# Patient Record
Sex: MALE | Race: OTHER | NOT HISPANIC OR LATINO | URBAN - METROPOLITAN AREA
[De-identification: names, ages, dates, MRNs, and addresses within clinical notes are randomized per-mention and may not be internally consistent; named-entity substitution may affect disease eponyms.]

---

## 2017-05-12 ENCOUNTER — EMERGENCY (EMERGENCY)
Facility: HOSPITAL | Age: 57
LOS: 1 days | Discharge: HOME | End: 2017-05-12
Attending: EMERGENCY MEDICINE | Admitting: EMERGENCY MEDICINE
Payer: MEDICARE

## 2017-05-12 ENCOUNTER — EMERGENCY (EMERGENCY)
Facility: HOSPITAL | Age: 57
LOS: 1 days | Discharge: LEFT BEFORE DISCHARGE | End: 2017-05-12
Attending: EMERGENCY MEDICINE | Admitting: EMERGENCY MEDICINE
Payer: MEDICARE

## 2017-05-12 VITALS
OXYGEN SATURATION: 96 % | HEART RATE: 86 BPM | RESPIRATION RATE: 18 BRPM | DIASTOLIC BLOOD PRESSURE: 71 MMHG | SYSTOLIC BLOOD PRESSURE: 111 MMHG

## 2017-05-12 VITALS
TEMPERATURE: 98 F | HEART RATE: 86 BPM | DIASTOLIC BLOOD PRESSURE: 56 MMHG | SYSTOLIC BLOOD PRESSURE: 91 MMHG | RESPIRATION RATE: 18 BRPM | OXYGEN SATURATION: 90 %

## 2017-05-12 VITALS
SYSTOLIC BLOOD PRESSURE: 121 MMHG | OXYGEN SATURATION: 97 % | HEART RATE: 97 BPM | TEMPERATURE: 97 F | DIASTOLIC BLOOD PRESSURE: 77 MMHG | RESPIRATION RATE: 18 BRPM

## 2017-05-12 DIAGNOSIS — F10.129 ALCOHOL ABUSE WITH INTOXICATION, UNSPECIFIED: ICD-10-CM

## 2017-05-12 DIAGNOSIS — R41.82 ALTERED MENTAL STATUS, UNSPECIFIED: ICD-10-CM

## 2017-05-12 PROCEDURE — 99283 EMERGENCY DEPT VISIT LOW MDM: CPT | Mod: 25

## 2017-05-12 PROCEDURE — 70450 CT HEAD/BRAIN W/O DYE: CPT | Mod: 26

## 2017-05-12 PROCEDURE — 99284 EMERGENCY DEPT VISIT MOD MDM: CPT

## 2017-05-12 NOTE — ED ADULT NURSE NOTE - OBJECTIVE STATEMENT
Pt appears sleepy but verbal. Answering questions appropriately. Pt admits to using crack and drinking etoh. Pt calm and appears in no distress. Pt appears sleepy but verbal. Answering questions appropriately. Pt admits to using crack and drinking etoh. Pt calm and appears in no distress. Pt unsure if he had any falls. Denies pain.

## 2017-05-12 NOTE — ED PROVIDER NOTE - PROGRESS NOTE DETAILS
Pt eloped Pt was placed in stretcher and then eloped, left  through [facility] street exit.

## 2017-05-12 NOTE — ED PROVIDER NOTE - OBJECTIVE STATEMENT
55 y/o M with pmhx Hepatitis C, schizophrenia (Takes Seroquel and Trazodone) brought in by EMS today for altered mental status. Pt admits to drinking 2 pints of alcohol today and drinks alcohol daily. States he may have fallen earlier. Denies pain.

## 2017-05-12 NOTE — ED PROVIDER NOTE - CONSTITUTIONAL, MLM
normal... Unkempt and disheveled Unkempt and disheveled. Slurred speech. unsteady gait but able to walk without assistance

## 2017-05-12 NOTE — ED ADULT NURSE NOTE - NS ED NURSE ELOPE COMMENTS
Pt appeared in no distress upon assessment. Pt did not state he was leaving. Pt saw RN and MD. Left through ambulance bay.

## 2017-05-12 NOTE — ED ADULT NURSE NOTE - OBJECTIVE STATEMENT
Pt was seen earlier for smoking crack and drinking alcohol. Upon assessment states he left and was drinking "[place]." Pt appears in no distress answering questions appropriately. Walked in with EMS.

## 2017-05-12 NOTE — ED PROVIDER NOTE - NS ED MD SCRIBE ATTENDING SCRIBE SECTIONS
VITAL SIGNS( Pullset)/REVIEW OF SYSTEMS/PAST MEDICAL/SURGICAL/SOCIAL HISTORY/DISPOSITION/HISTORY OF PRESENT ILLNESS/HIV/PHYSICAL EXAM

## 2017-05-12 NOTE — ED PROVIDER NOTE - MEDICAL DECISION MAKING DETAILS
plan: fingerstick, BAL since pt returned after recent d/c, had a HCT earlier today. no signs of new trauma

## 2017-05-12 NOTE — ED PROVIDER NOTE - OBJECTIVE STATEMENT
57 y/o M, recently d/c after observation period in ED for 3 hrs for AMS/alcohol intoxication. Pt had straight gait and requested to be d/c. PD was called after pt was d/c as he was ambulating in the middle of the street. EMS was called. No signs of trauma. No fall. 57 y/o M, recently d/c after observation period in ED for 3 hrs for AMS/alcohol intoxication. Pt had straight gait and requested to be d/c. PD was called after pt was d/c as he was ambulating outside and seemed altered. EMS was called. No signs of trauma. No fall.

## 2017-05-12 NOTE — ED PROVIDER NOTE - NS ED MD SCRIBE ATTENDING SCRIBE SECTIONS
PAST MEDICAL/SURGICAL/SOCIAL HISTORY/PROGRESS NOTE/VITAL SIGNS( Pullset)/HISTORY OF PRESENT ILLNESS/HIV

## 2017-05-12 NOTE — ED ADULT NURSE REASSESSMENT NOTE - NS ED NURSE REASSESS COMMENT FT1
Pt appeared in no distress upon assessment. Pt did not state he was leaving. Pt saw RN and MD but did not stay for further evaluation. Pt left through ambulance bay.

## 2017-05-26 ENCOUNTER — EMERGENCY (EMERGENCY)
Facility: HOSPITAL | Age: 57
LOS: 1 days | Discharge: HOME | End: 2017-05-26
Attending: EMERGENCY MEDICINE | Admitting: EMERGENCY MEDICINE
Payer: MEDICARE

## 2017-05-26 VITALS
OXYGEN SATURATION: 96 % | TEMPERATURE: 98 F | RESPIRATION RATE: 16 BRPM | DIASTOLIC BLOOD PRESSURE: 78 MMHG | HEART RATE: 92 BPM | SYSTOLIC BLOOD PRESSURE: 104 MMHG

## 2017-05-26 VITALS
OXYGEN SATURATION: 98 % | HEART RATE: 99 BPM | TEMPERATURE: 98 F | DIASTOLIC BLOOD PRESSURE: 86 MMHG | RESPIRATION RATE: 16 BRPM | SYSTOLIC BLOOD PRESSURE: 130 MMHG

## 2017-05-26 DIAGNOSIS — R41.82 ALTERED MENTAL STATUS, UNSPECIFIED: ICD-10-CM

## 2017-05-26 DIAGNOSIS — F10.129 ALCOHOL ABUSE WITH INTOXICATION, UNSPECIFIED: ICD-10-CM

## 2017-05-26 PROCEDURE — 99285 EMERGENCY DEPT VISIT HI MDM: CPT

## 2017-05-26 PROCEDURE — 99283 EMERGENCY DEPT VISIT LOW MDM: CPT

## 2017-05-26 NOTE — ED ADULT NURSE NOTE - OBJECTIVE STATEMENT
pt to ER w/ report of alcohol intoxication.  pt awake, responds appropriately to verbal questioning w/ slurred speech, denies injury.  no evidence of trauma. pt falls easily asleep when left alone, respirations even, chest rise regular.  HOB and side rails up, aspiration precautions in effect.  Will continue to monitor. Pt to ER w/ report of alcohol intoxication.  pt awake, responds appropriately to verbal questioning w/ slurred speech, denies injury.  no evidence of trauma. pt falls easily asleep when left alone, respirations even, chest rise regular.  HOB and side rails up, aspiration precautions in effect.  Will continue to monitor.

## 2017-05-26 NOTE — ED ADULT TRIAGE NOTE - CHIEF COMPLAINT QUOTE
biba from street for AMS, pt was found stumbling on street with unsteady gait, pt has ETOH on breath

## 2017-05-26 NOTE — ED PROVIDER NOTE - OBJECTIVE STATEMENT
57yo M here with etoh intoxication. admits to drinking daily. has been seen for same at [facility].

## 2017-05-26 NOTE — ED PROVIDER NOTE - MEDICAL DECISION MAKING DETAILS
here for etoh intoxication. sobered up, then started asking people for money in the ed. discharged with security

## 2017-09-19 ENCOUNTER — EMERGENCY (EMERGENCY)
Facility: HOSPITAL | Age: 57
LOS: 1 days | Discharge: HOME | End: 2017-09-19
Attending: EMERGENCY MEDICINE | Admitting: EMERGENCY MEDICINE
Payer: MEDICARE

## 2017-09-19 VITALS
DIASTOLIC BLOOD PRESSURE: 80 MMHG | HEART RATE: 82 BPM | OXYGEN SATURATION: 96 % | SYSTOLIC BLOOD PRESSURE: 168 MMHG | TEMPERATURE: 98 F | RESPIRATION RATE: 16 BRPM

## 2017-09-19 VITALS — HEART RATE: 99 BPM | RESPIRATION RATE: 16 BRPM | OXYGEN SATURATION: 96 %

## 2017-09-19 DIAGNOSIS — F14.10 COCAINE ABUSE, UNCOMPLICATED: ICD-10-CM

## 2017-09-19 DIAGNOSIS — Z72.0 TOBACCO USE: ICD-10-CM

## 2017-09-19 DIAGNOSIS — Z23 ENCOUNTER FOR IMMUNIZATION: ICD-10-CM

## 2017-09-19 DIAGNOSIS — E03.9 HYPOTHYROIDISM, UNSPECIFIED: ICD-10-CM

## 2017-09-19 DIAGNOSIS — F10.10 ALCOHOL ABUSE, UNCOMPLICATED: ICD-10-CM

## 2017-09-19 PROCEDURE — 90715 TDAP VACCINE 7 YRS/> IM: CPT

## 2017-09-19 PROCEDURE — 99283 EMERGENCY DEPT VISIT LOW MDM: CPT | Mod: 25

## 2017-09-19 PROCEDURE — 90471 IMMUNIZATION ADMIN: CPT

## 2017-09-19 PROCEDURE — 36415 COLL VENOUS BLD VENIPUNCTURE: CPT

## 2017-09-19 PROCEDURE — 80053 COMPREHEN METABOLIC PANEL: CPT

## 2017-09-19 PROCEDURE — 93010 ELECTROCARDIOGRAM REPORT: CPT

## 2017-09-19 PROCEDURE — 80307 DRUG TEST PRSMV CHEM ANLYZR: CPT

## 2017-09-19 PROCEDURE — 85025 COMPLETE CBC W/AUTO DIFF WBC: CPT

## 2017-09-19 PROCEDURE — 93005 ELECTROCARDIOGRAM TRACING: CPT

## 2017-09-19 PROCEDURE — 99284 EMERGENCY DEPT VISIT MOD MDM: CPT | Mod: 25

## 2017-09-19 RX ORDER — SODIUM CHLORIDE 9 MG/ML
1000 INJECTION INTRAMUSCULAR; INTRAVENOUS; SUBCUTANEOUS ONCE
Qty: 0 | Refills: 0 | Status: DISCONTINUED | OUTPATIENT
Start: 2017-09-19 | End: 2017-09-19

## 2017-09-19 RX ORDER — TETANUS TOXOID, REDUCED DIPHTHERIA TOXOID AND ACELLULAR PERTUSSIS VACCINE, ADSORBED 5; 2.5; 8; 8; 2.5 [IU]/.5ML; [IU]/.5ML; UG/.5ML; UG/.5ML; UG/.5ML
0.5 SUSPENSION INTRAMUSCULAR ONCE
Qty: 0 | Refills: 0 | Status: COMPLETED | OUTPATIENT
Start: 2017-09-19 | End: 2017-09-19

## 2017-09-19 RX ADMIN — TETANUS TOXOID, REDUCED DIPHTHERIA TOXOID AND ACELLULAR PERTUSSIS VACCINE, ADSORBED 0.5 MILLILITER(S): 5; 2.5; 8; 8; 2.5 SUSPENSION INTRAMUSCULAR at 20:04

## 2017-09-19 RX ADMIN — Medication 50 MILLIGRAM(S): at 22:51

## 2017-09-19 NOTE — ED PROVIDER NOTE - PHYSICAL EXAMINATION
VITAL SIGNS: I have reviewed nursing notes and confirm.  CONSTITUTIONAL: Well-developed discheveled man smelling of urine and alcohol lying in stretcher A&Ox3 speakign clearly in complete sentences, following commands appropriately; in no acute distress.  SKIN: Agree with RN documentation regarding decubitus evaluation. Remainder of skin exam is warm and dry, no acute rash.  HEAD: Normocephalic; atraumatic.  EYES: PERRL, EOM intact; conjunctiva and sclera clear.  ENT: No nasal discharge; airway clear.  NECK: Supple; non tender.  CARD: S1, S2 normal; no murmurs, gallops, or rubs. Regular rate and rhythm.  RESP: No wheezes, rales or rhonchi, CTA b/l w/good excursion, no tachypnea or inc wob  ABD: Normal bowel sounds; soft; non-distended; non-tender  EXT: Normal ROM. No clubbing, cyanosis or edema.  LYMPH: No acute cervical adenopathy.  NEURO: Alert, oriented. Grossly unremarkable.  PSYCH: Cooperative, appropriate.

## 2017-09-19 NOTE — ED ADULT NURSE NOTE - OBJECTIVE STATEMENT
56 y/o male presenting to ER c/o headache after bumping into a wall, pt reports taking 50 UDS worth of cocaine and drinking 1 bottle of vodka today. redness and hematoma noted to the left side of forehead, Pt denies LOC. EKG done upon arrival. Pt unable to recall last tetanus.

## 2017-09-19 NOTE — ED ADULT NURSE NOTE - CHIEF COMPLAINT QUOTE
biba from street for etoh (1 bottle vodka) and did $50 worth of cocaine today per ems.  A&Ox3.  Denies discomfort / LOC.  Laceration to upper lip - patient ran into pole. Bleeding controlled.  Also c/o back pain.  Unknown tetanus

## 2017-09-19 NOTE — ED PROVIDER NOTE - MEDICAL DECISION MAKING DETAILS
Clinically sober, no cough or belly pain, told to return to ED or seek medical attention if he develops cough/abd pain/fever. Given return precautions, given librium to avoid etoh withdrawal.

## 2017-09-19 NOTE — ED PROVIDER NOTE - OBJECTIVE STATEMENT
58 y/o M w/known etoh abuse admits to drinking alcohol (vodka, beer) and snorting cocaine, found by EMS sleeping on the street, brought in for eval, no pain of any kind. Denies trauma/falls. Asking to sleep and to have a sandwich.

## 2017-09-19 NOTE — ED ADULT TRIAGE NOTE - CHIEF COMPLAINT QUOTE
biba from street for etoh (1 bottle vodka) and did $50 worth of cocaine today per ems.  A&Ox3.  Denies discomfort / LOC.  Laceration to upper lip - patient ran into pole. biba from street for etoh (1 bottle vodka) and did $50 worth of cocaine today per ems.  A&Ox3.  Denies discomfort / LOC.  Laceration to upper lip - patient ran into pole. Bleeding controlled.  Also c/o back pain.  Unknown tetanus

## 2017-10-05 ENCOUNTER — EMERGENCY (EMERGENCY)
Facility: HOSPITAL | Age: 57
LOS: 1 days | Discharge: HOME | End: 2017-10-05
Attending: EMERGENCY MEDICINE | Admitting: EMERGENCY MEDICINE
Payer: MEDICARE

## 2017-10-05 VITALS
WEIGHT: 179.9 LBS | TEMPERATURE: 98 F | DIASTOLIC BLOOD PRESSURE: 76 MMHG | SYSTOLIC BLOOD PRESSURE: 124 MMHG | RESPIRATION RATE: 16 BRPM | HEART RATE: 100 BPM | OXYGEN SATURATION: 96 %

## 2017-10-05 DIAGNOSIS — F10.129 ALCOHOL ABUSE WITH INTOXICATION, UNSPECIFIED: ICD-10-CM

## 2017-10-05 DIAGNOSIS — F20.9 SCHIZOPHRENIA, UNSPECIFIED: ICD-10-CM

## 2017-10-05 DIAGNOSIS — E03.9 HYPOTHYROIDISM, UNSPECIFIED: ICD-10-CM

## 2017-10-05 DIAGNOSIS — R41.82 ALTERED MENTAL STATUS, UNSPECIFIED: ICD-10-CM

## 2017-10-05 PROCEDURE — 99285 EMERGENCY DEPT VISIT HI MDM: CPT

## 2017-10-05 PROCEDURE — 99283 EMERGENCY DEPT VISIT LOW MDM: CPT

## 2017-10-05 NOTE — ED PROVIDER NOTE - ATTENDING CONTRIBUTION TO CARE
57 yom bibems for intox, admits to EtOH use, no trauma noted.  limited history 2/2 mental status.    agree w/ PA, clinically intoxicated, no trauma noted on exam, protecting airway, will assess for sobriety.

## 2017-10-05 NOTE — ED ADULT NURSE NOTE - OBJECTIVE STATEMENT
Pt presents to ED c/o alcohol intoxication. Pt states "I drank 2 or 3 pints of bacardi today. I went to the police station because I felt dizzy." Pt denies falling, trauma, hitting head. Pt presents in NAD speaking full sentences via EMS stretcher through triage. Alcohol odor noted on breath.

## 2017-10-06 VITALS
DIASTOLIC BLOOD PRESSURE: 72 MMHG | RESPIRATION RATE: 17 BRPM | HEART RATE: 92 BPM | OXYGEN SATURATION: 97 % | TEMPERATURE: 98 F | SYSTOLIC BLOOD PRESSURE: 132 MMHG

## 2017-10-27 ENCOUNTER — EMERGENCY (EMERGENCY)
Facility: HOSPITAL | Age: 57
LOS: 1 days | Discharge: HOME | End: 2017-10-27
Attending: EMERGENCY MEDICINE | Admitting: EMERGENCY MEDICINE
Payer: MEDICARE

## 2017-10-27 VITALS
SYSTOLIC BLOOD PRESSURE: 125 MMHG | TEMPERATURE: 98 F | OXYGEN SATURATION: 95 % | DIASTOLIC BLOOD PRESSURE: 87 MMHG | RESPIRATION RATE: 16 BRPM | HEART RATE: 80 BPM

## 2017-10-27 DIAGNOSIS — R41.82 ALTERED MENTAL STATUS, UNSPECIFIED: ICD-10-CM

## 2017-10-27 DIAGNOSIS — F10.129 ALCOHOL ABUSE WITH INTOXICATION, UNSPECIFIED: ICD-10-CM

## 2017-10-27 LAB
ALBUMIN SERPL ELPH-MCNC: 3.6 G/DL — SIGNIFICANT CHANGE UP (ref 3.4–5)
ALP SERPL-CCNC: 75 U/L — SIGNIFICANT CHANGE UP (ref 40–120)
ALT FLD-CCNC: 54 U/L — HIGH (ref 12–42)
ANION GAP SERPL CALC-SCNC: 15 MMOL/L — SIGNIFICANT CHANGE UP (ref 9–16)
AST SERPL-CCNC: 88 U/L — HIGH (ref 15–37)
BASOPHILS NFR BLD AUTO: 0.2 % — SIGNIFICANT CHANGE UP (ref 0–2)
BILIRUB SERPL-MCNC: 0.2 MG/DL — SIGNIFICANT CHANGE UP (ref 0.2–1.2)
BUN SERPL-MCNC: 15 MG/DL — SIGNIFICANT CHANGE UP (ref 7–23)
CALCIUM SERPL-MCNC: 9.1 MG/DL — SIGNIFICANT CHANGE UP (ref 8.5–10.5)
CHLORIDE SERPL-SCNC: 108 MMOL/L — SIGNIFICANT CHANGE UP (ref 96–108)
CO2 SERPL-SCNC: 21 MMOL/L — LOW (ref 22–31)
CREAT SERPL-MCNC: 0.92 MG/DL — SIGNIFICANT CHANGE UP (ref 0.5–1.3)
EOSINOPHIL NFR BLD AUTO: 0.5 % — SIGNIFICANT CHANGE UP (ref 0–6)
ETHANOL SERPL-MCNC: 219 MG/DL — HIGH
GLUCOSE SERPL-MCNC: 90 MG/DL — SIGNIFICANT CHANGE UP (ref 70–99)
HCT VFR BLD CALC: 37 % — LOW (ref 39–50)
HGB BLD-MCNC: 12.3 G/DL — LOW (ref 13–17)
IMM GRANULOCYTES NFR BLD AUTO: 0.4 % — SIGNIFICANT CHANGE UP (ref 0–1.5)
LIDOCAIN IGE QN: 110 U/L — SIGNIFICANT CHANGE UP (ref 73–393)
LYMPHOCYTES # BLD AUTO: 18.9 % — SIGNIFICANT CHANGE UP (ref 13–44)
MAGNESIUM SERPL-MCNC: 1.9 MG/DL — SIGNIFICANT CHANGE UP (ref 1.6–2.6)
MCHC RBC-ENTMCNC: 31.2 PG — SIGNIFICANT CHANGE UP (ref 27–34)
MCHC RBC-ENTMCNC: 33.2 G/DL — SIGNIFICANT CHANGE UP (ref 32–36)
MCV RBC AUTO: 93.9 FL — SIGNIFICANT CHANGE UP (ref 80–100)
MONOCYTES NFR BLD AUTO: 7.5 % — SIGNIFICANT CHANGE UP (ref 2–14)
NEUTROPHILS NFR BLD AUTO: 72.5 % — SIGNIFICANT CHANGE UP (ref 43–77)
PLATELET # BLD AUTO: 310 K/UL — SIGNIFICANT CHANGE UP (ref 150–400)
POTASSIUM SERPL-MCNC: 4.2 MMOL/L — SIGNIFICANT CHANGE UP (ref 3.5–5.3)
POTASSIUM SERPL-SCNC: 4.2 MMOL/L — SIGNIFICANT CHANGE UP (ref 3.5–5.3)
PROT SERPL-MCNC: 8 G/DL — SIGNIFICANT CHANGE UP (ref 6.4–8.2)
RBC # BLD: 3.94 M/UL — LOW (ref 4.2–5.8)
RBC # FLD: 13.6 % — SIGNIFICANT CHANGE UP (ref 10.3–16.9)
SODIUM SERPL-SCNC: 144 MMOL/L — SIGNIFICANT CHANGE UP (ref 132–145)
TSH SERPL-MCNC: 0.36 UIU/ML — SIGNIFICANT CHANGE UP (ref 0.36–3.74)
WBC # BLD: 13.7 K/UL — HIGH (ref 3.8–10.5)
WBC # FLD AUTO: 13.7 K/UL — HIGH (ref 3.8–10.5)

## 2017-10-27 PROCEDURE — 70450 CT HEAD/BRAIN W/O DYE: CPT | Mod: 26

## 2017-10-27 PROCEDURE — 71010: CPT | Mod: 26

## 2017-10-27 PROCEDURE — 93010 ELECTROCARDIOGRAM REPORT: CPT

## 2017-10-27 PROCEDURE — 99284 EMERGENCY DEPT VISIT MOD MDM: CPT | Mod: 25

## 2017-10-27 NOTE — ED PROVIDER NOTE - CARE PLAN
Principal Discharge DX:	Altered mental status Principal Discharge DX:	Altered mental status  Secondary Diagnosis:	Alcohol intoxication

## 2017-10-27 NOTE — ED PROVIDER NOTE - MEDICAL DECISION MAKING DETAILS
pt with AMS, will check labs including BAL, drug screen, HCT, will observe for sobriety and reassess

## 2017-10-27 NOTE — ED PROVIDER NOTE - OBJECTIVE STATEMENT
57 male, hx of schizophrenia? on seroquel, also hx of Hep C?, limited hx since pt seems acutely intoxicated. Pt BIBEMS for AMS, suspicious for alcohol intox and polysubstance use. no signs of trauma. no chest pain or SOB. Old chart review shows multiple visits w similar presentation.

## 2017-10-31 ENCOUNTER — EMERGENCY (EMERGENCY)
Facility: HOSPITAL | Age: 57
LOS: 1 days | Discharge: HOME | End: 2017-10-31
Attending: EMERGENCY MEDICINE | Admitting: EMERGENCY MEDICINE
Payer: MEDICARE

## 2017-10-31 VITALS
RESPIRATION RATE: 18 BRPM | OXYGEN SATURATION: 98 % | HEART RATE: 85 BPM | TEMPERATURE: 97 F | DIASTOLIC BLOOD PRESSURE: 71 MMHG | SYSTOLIC BLOOD PRESSURE: 104 MMHG

## 2017-10-31 PROCEDURE — 70450 CT HEAD/BRAIN W/O DYE: CPT | Mod: 26

## 2017-10-31 PROCEDURE — 99284 EMERGENCY DEPT VISIT MOD MDM: CPT

## 2017-10-31 NOTE — ED PROVIDER NOTE - OBJECTIVE STATEMENT
58 y/o Male BIBA for AMS. Pt admitted to EMS he drank alcohol today and has no other complaints at this time. Currently in the ED, pt is sleeping and does not respond to verbal stimuli

## 2017-10-31 NOTE — ED ADULT NURSE NOTE - CAS DISCH CONDITION
Stable Keystone Flap Text: The defect edges were debeveled with a #15 scalpel blade.  Given the location of the defect, shape of the defect a keystone flap was deemed most appropriate.  Using a sterile surgical marker, an appropriate keystone flap was drawn incorporating the defect, outlining the appropriate donor tissue and placing the expected incisions within the relaxed skin tension lines where possible. The area thus outlined was incised deep to adipose tissue with a #15 scalpel blade.  The skin margins were undermined to an appropriate distance in all directions around the primary defect and laterally outward around the flap utilizing iris scissors.

## 2017-10-31 NOTE — ED PROVIDER NOTE - PROGRESS NOTE DETAILS
The patient is now awake and alert, clinically sober.  Able to walk a straight line.  Repeat exam and neuro/cranial nerve exams normal.  No evidence of head/neck trauma.  Patient denies any pain or other complaints.  Denies cp/sob/ha/abd pain.  Abd soft, lungs clear, heart exam normal.  Chang po challenge.  Patient says only used alcohol no other substances.  Denies any assault.  Feels much better and pt feels safe for discharge.  No evidence of intoxication at this time or alcohol withdrawal.  No other complaints on discharge.

## 2017-10-31 NOTE — ED ADULT NURSE NOTE - OBJECTIVE STATEMENT
Pt brought in by EMS for alcohol intoxication, pt admits to drinking this morning. Pt AAO x 3 , ambulatory with steady gait

## 2017-11-05 DIAGNOSIS — R41.82 ALTERED MENTAL STATUS, UNSPECIFIED: ICD-10-CM

## 2017-11-05 DIAGNOSIS — F10.10 ALCOHOL ABUSE, UNCOMPLICATED: ICD-10-CM

## 2018-02-15 ENCOUNTER — HOSPITAL ENCOUNTER (INPATIENT)
Dept: HOSPITAL 74 - YASAS | Age: 58
LOS: 1 days | Discharge: LEFT BEFORE DISCHARGE | DRG: 894 | End: 2018-02-16
Attending: INTERNAL MEDICINE | Admitting: INTERNAL MEDICINE
Payer: COMMERCIAL

## 2018-02-15 VITALS — BODY MASS INDEX: 31.3 KG/M2

## 2018-02-15 DIAGNOSIS — F10.230: ICD-10-CM

## 2018-02-15 DIAGNOSIS — F25.9: ICD-10-CM

## 2018-02-15 DIAGNOSIS — F51.05: ICD-10-CM

## 2018-02-15 DIAGNOSIS — F17.213: ICD-10-CM

## 2018-02-15 DIAGNOSIS — F14.20: ICD-10-CM

## 2018-02-15 DIAGNOSIS — E05.90: ICD-10-CM

## 2018-02-15 DIAGNOSIS — F19.230: Primary | ICD-10-CM

## 2018-02-15 DIAGNOSIS — B18.2: ICD-10-CM

## 2018-02-15 LAB
ALBUMIN SERPL-MCNC: 4.1 G/DL (ref 3.4–5)
ALP SERPL-CCNC: 103 U/L (ref 45–117)
ALT SERPL-CCNC: 152 U/L (ref 12–78)
ANION GAP SERPL CALC-SCNC: 10 MMOL/L (ref 8–16)
APPEARANCE UR: CLEAR
AST SERPL-CCNC: 107 U/L (ref 15–37)
BILIRUB SERPL-MCNC: 0.7 MG/DL (ref 0.2–1)
BILIRUB UR STRIP.AUTO-MCNC: NEGATIVE MG/DL
BUN SERPL-MCNC: 17 MG/DL (ref 7–18)
CALCIUM SERPL-MCNC: 8.8 MG/DL (ref 8.5–10.1)
CHLORIDE SERPL-SCNC: 106 MMOL/L (ref 98–107)
CO2 SERPL-SCNC: 24 MMOL/L (ref 21–32)
COLOR UR: YELLOW
CREAT SERPL-MCNC: 0.9 MG/DL (ref 0.7–1.3)
DEPRECATED RDW RBC AUTO: 14.3 % (ref 11.9–15.9)
GLUCOSE SERPL-MCNC: 117 MG/DL (ref 74–106)
HCT VFR BLD CALC: 40.8 % (ref 35.4–49)
HGB BLD-MCNC: 13.5 GM/DL (ref 11.7–16.9)
KETONES UR QL STRIP: (no result)
LEUKOCYTE ESTERASE UR QL STRIP.AUTO: NEGATIVE
MCH RBC QN AUTO: 30.1 PG (ref 25.7–33.7)
MCHC RBC AUTO-ENTMCNC: 33.2 G/DL (ref 32–35.9)
MCV RBC: 90.5 FL (ref 80–96)
NITRITE UR QL STRIP: NEGATIVE
PH UR: 5 [PH] (ref 5–8)
PLATELET # BLD AUTO: 320 K/MM3 (ref 134–434)
PMV BLD: 8.1 FL (ref 7.5–11.1)
POTASSIUM SERPLBLD-SCNC: 3.8 MMOL/L (ref 3.5–5.1)
PROT SERPL-MCNC: 8.5 G/DL (ref 6.4–8.2)
PROT UR QL STRIP: NEGATIVE
PROT UR QL STRIP: NEGATIVE
RBC # BLD AUTO: 4.5 M/MM3 (ref 4–5.6)
RBC # UR STRIP: NEGATIVE /UL
SODIUM SERPL-SCNC: 140 MMOL/L (ref 136–145)
SP GR UR: 1.02 (ref 1–1.03)
UROBILINOGEN UR STRIP-MCNC: (no result) MG/DL (ref 0.2–1)
WBC # BLD AUTO: 9.4 K/MM3 (ref 4–10)

## 2018-02-15 PROCEDURE — HZ2ZZZZ DETOXIFICATION SERVICES FOR SUBSTANCE ABUSE TREATMENT: ICD-10-PCS | Performed by: INTERNAL MEDICINE

## 2018-02-15 RX ADMIN — NICOTINE SCH MG: 14 PATCH, EXTENDED RELEASE TRANSDERMAL at 15:23

## 2018-02-15 NOTE — HP
CIWA Score





- CIWA Score


Nausea/Vomitin-Int. Nausea w/Dry Heave


Muscle Tremors: 4-Moderate,w/Arms Extend


Anxiety: 4-Mod. Anxious/Guarded


Agitation: 4-Moderately Restless


Paroxysmal Sweats: No Perspiration


Orientation: 0-Oriented


Tacttile Disturbances: 3-Moderate Itch/Numb/Burn


Auditory Disturbances: 0-None


Visual Disturbances: 0-None


Headache: 0-None Present


CIWA-Ar Total Score: 19





Admission ROS [facility]





- HPI


Chief Complaint: 





WITHDRAWAL SX FROM ALCOHOL


Allergies/Adverse Reactions: 


 Allergies











Allergy/AdvReac Type Severity Reaction Status Date / Time


 


No Known Allergies Allergy   Verified 02/15/18 11:08











History of Present Illness: 





58 Y/O AA/MALE WITH A HX OF ALCOHOL DEPENDENCE SEEKING DETOX [place]


Exam Limitations: No Limitations





- Ebola screening


Have you traveled outside of the country in the last 21 days: No


Have you had contact with anyone from an Ebola affected area: No


Have you been sick,other than usual withdrawal symptoms: No





- Review of Systems


Constitutional: Chills, Night Sweats, Changes in sleep


Respiratory: reports: No Symptoms reported


Cardiac: reports: Lightheadedness


GI: reports: Nausea, Vomiting


: reports: No Symptoms Reported


Musculoskeletal: reports: No Symptoms Reported


Integumentary: reports: No Symptoms Reported


Neuro: reports: Headache, Tremors, Unsteady Gait, Dizziness


Endocrine: reports: No Symptoms Reported


Hematology: reports: No Symptoms Reported


Psychiatric: reports: Orientated x3, Anxious, Depressed


Other Systems: Reviewed and Negative





Patient History





- Patient Medical History


Hx Anemia: No


Hx Asthma: No


Hx Chronic Obstructive Pulmonary Disease (COPD): No


Hx Cancer: No


Hx Cardiac Disorders: No


Hx Congestive Heart Failure: No


Hx Hypertension: No


Hx Hypercholesterolemia: No


Hx Pacemaker: No


HX Cerebrovascular Accident: No


Hx Seizures: No


Hx Dementia: No


Hx Diabetes: No


Hx Gastrointestinal Disorders: No


Hx Liver Disease: No


Hx Genitourinary Disorders: No


Hx Sexually Transmitted Disorders: No


Hx Renal Disease (ESRD): No


Hx Thyroid Disease: Yes (HX HYPERTHYROIDISM- NO MED)


Hx Human Immunodeficiency Virus (HIV): No (NEGATIVE HX)


Hx Hepatitis C: Yes (1 year)


Hx Depression: Yes (INSOMNIA-SEROQUEL,TRAZODONE & VISTARIL)


Hx Suicide Attempt: No (DENIES PREVIOUS AND CURRENT S/H/I TODAY)


Hx Bipolar Disorder: No


Hx Schizophrenia: No (DENIES)





- Patient Surgical History


Past Surgical History: Yes


Hx Neurologic Surgery: No


Hx Cataract Extraction: No


Hx Cardiac Surgery: No


Hx Lung Surgery: No


Hx Breast Surgery: No


Hx Breast Biopsy: No


Hx Abdominal Surgery: No


Hx Appendectomy: No


Hx Cholecystectomy: No


Hx Genitourinary Surgery: No


Hx Orthopedic Surgery: Yes (RT.HAND 4TH FINGER)


Anesthesia Reaction: No





- PPD History


Previous Implant?: Yes


Documented Results: Negative w/proof


Implanted On Prior [facility] Admission?: Yes


Date: 10/29/16


Results: 0 mm


PPD to be Administered?: Yes





- Reproductive History


Patient is a Female of Child Bearing Age (11 -55 yrs old): No (MALE)





- Smoking Cessation


Smoking history: Current every day smoker


Have you smoked in the past 12 months: Yes


Aproximately how many cigarettes per day: 5


Cigars Per Day: 0


Hx Chewing Tobacco Use: No


Initiated information on smoking cessation: Yes


'Breaking Loose' booklet given: 02/15/18





- Substance & Tx. History


Hx Alcohol Use: Yes (RUM/BEER)


Hx Substance Use: Yes (COCAINE/MARIJUANA)


Substance Use Type: Alcohol, Cocaine, Marijuana


Hx Substance Use Treatment: Yes





- Substances Abused


  ** Alcohol


Route: Oral


Frequency: Daily


Amount used: rum(2 pints)/beer(5-16oz cans)


Age of first use: 16


Date of Last Use: 18





  ** Cocaine


Route: Smoking


Frequency: Daily


Amount used: $50


Age of first use: 24


Date of Last Use: 18





  ** Marijuana/Hashish


Route: Smoking


Frequency: Daily


Amount used: $20


Age of first use: 16


Date of Last Use: 18





Family Disease History





- Family Disease History


Family Disease History: Other: Father (alcohol), Mother (alcohol), Brother (

alcohol), Sister (alcohol)





Admission Physical Exam [facility]





- Vital Signs


Vital Signs: 


 Vital Signs - 24 hr











  02/15/18





  10:43


 


Temperature 96.8 F L


 


Pulse Rate 104 H


 


Respiratory 20





Rate 


 


Blood Pressure 150/73














- Physical


General Appearance: Yes: Moderate Distress, Alcohol on Breath, Intoxicated, 

Anxious


HEENTM: Yes: EOMI, Normocephalic, LIZET, Pharynx Normal


Respiratory: Yes: Chest Non-Tender, Lungs Clear, Normal Breath Sounds, No 

Respiratory Distress


Neck: Yes: No masses,lesions,Nodules, Supple, Trachea in good position


Breast: Yes: Breast Exam Deferred


Cardiology: Yes: Regular Rhythm, Regular Rate, S1, S2


Abdominal: Yes: Normal Bowel Sounds, Non Tender, Flat, Soft


Genitourinary: Yes: Other (N/C)


Back: Yes: Within Normal Limits


Musculoskeletal: Yes: full range of Motion, Gait Steady


Extremities: Yes: Normal Range of Motion, Non-Tender


Neurological: Yes: CNs II-XII NML intact, Fully Oriented, Alert, Motor Strength 

5/5


Integumentary: Yes: Dry, Warm


Lymphatic: Yes: Within Normal Limits





- Diagnostic


(1) Hepatitis C carrier


Current Visit: Yes   Status: Chronic   





(2) Hyperthyroidism


Current Visit: Yes   Status: Chronic   Comment: PT HX-NO MEDS   





(3) Nicotine dependence


Current Visit: Yes   Status: Acute   


Qualifiers: 


   Nicotine product type: cigarettes   Substance use status: in withdrawal   

Qualified Code(s): F17.213 - Nicotine dependence, cigarettes, with withdrawal   





(4) Alcohol dependence with uncomplicated withdrawal


Current Visit: Yes   Status: Acute   





(5) Cocaine dependence with withdrawal


Current Visit: Yes   Status: Acute   





(6) Insomnia secondary to depression with anxiety


Current Visit: Yes   Status: Chronic   





Cleared for Admission [facility]





- Detox or Rehab


[facility] Level of Care: Medically Managed


Detox Regimen/Protocol: Librium





[facility] Breath Alcohol Content


Breath Alcohol Content: 0.035





Urine Drug Screen





- Results


Drug Screen Negative: No


Urine Drug Screen Results: MOMO-Cocaine

## 2018-02-16 VITALS — TEMPERATURE: 98.3 F | SYSTOLIC BLOOD PRESSURE: 147 MMHG | DIASTOLIC BLOOD PRESSURE: 89 MMHG | HEART RATE: 90 BPM

## 2018-02-16 RX ADMIN — NICOTINE SCH MG: 14 PATCH, EXTENDED RELEASE TRANSDERMAL at 10:05

## 2018-02-16 NOTE — EKG
Test Reason : 

Blood Pressure : ***/*** mmHG

Vent. Rate : 102 BPM     Atrial Rate : 102 BPM

   P-R Int : 136 ms          QRS Dur : 086 ms

    QT Int : 364 ms       P-R-T Axes : 032 011 015 degrees

   QTc Int : 474 ms

 

SINUS TACHYCARDIA

OTHERWISE NORMAL ECG

NO PREVIOUS ECGS AVAILABLE

Confirmed by SONAM DAWN MD ([id number]) on 2/16/2018 9:22:00 AM

 

Referred By:             Confirmed By:SONAM DAWN MD

## 2018-02-16 NOTE — CONSULT
[facility] Psychiatric Consult





- Data


Date of interview: 02/16/18


Admission source: [facility]


Identifying data: Readmission to [facility] for this 56 y/o  male 

seeking detox treatment on [address] for alcohol,cocaine and cannabis 

dependence.PAtient is single without children,homeless,unemployed and supported 

on SSD benefits.


Substance Abuse History: Confirmed by patient in this session.Details in 

current [facility] report. Smoking history: Current every day smoker.  Have you smoked 

in the past 12 months: Yes.  Aproximately how many cigarettes per day: 5.  

Cigars Per Day: 0.  Hx Chewing Tobacco Use: No.  Initiated information on 

smoking cessation: Yes.  'Breaking Loose' booklet given: 02/15/18.  - Substance 

& Tx. History.  Hx Alcohol Use: Yes (RUM/BEER).  Hx Substance Use: Yes (COCAINE/

MARIJUANA).  Substance Use Type: Alcohol, Cocaine, Marijuana.  Hx Substance Use 

Treatment: Yes.  - Substances Abused.  ** Alcohol.  Route: Oral.  Frequency: 

Daily.  Amount used: rum(2 pints)/beer(5-16oz cans).  Age of first use: 16.  

Date of Last Use: 02/14/18.  ** Cocaine.  Route: Smoking.  Frequency: Daily.  

Amount used: $50.  Age of first use: 24.  Date of Last Use: 02/14/18.  ** 

Marijuana/Hashish.  Route: Smoking.  Frequency: Daily.  Amount used: $20.  Age 

of first use: 16.  Date of Last Use: 02/14/18


Medical History: Hepatitis C,hyperthyroidism and a history of orthosurgery (

fourth finger of right hand).


Psychiatric History: Onset of psychiatric disturbances (age 24).Patient 

presents with a history of three psychiatric hospitalizations ([facility] at age 24 + [facility] in 2015 + [facility] six 

months ago).Diagnosed with MDD.Currently maintained on a regimen of seroquel 

300 mg/hs + trazodone 100 mg/hs.Reportedly taken two days prior to this [facility] 

visit.Mr العلي declares that he gets psychiatric OPD care at the [facility] 

Hospital clinic.Denies history of suicide attempts.


Physical/Sexual Abuse/Trauma History: Patient denies history of abuse.Served in 

the [employer] from 1976 to 1980.No reported combat experience.


Additional Comment: Urine Drug Screen Results: MOMO-Cocaine.Noted.





Mental Status Exam





- Mental Status Exam


Alert and Oriented to: Time, Place, Person


Cognitive Function: Good


Patient Appearance: Well Groomed


Mood: Nervous, Withdrawn, Anxious


Affect: Mood Congruent, Constricted


Patient Behavior: Appropriate, Cooperative


Speech Pattern: Clear, Appropriate


Voice Loudness: Normal


Thought Process: Intact, Goal Oriented


Thought Disorder: Not Present


Hallucinations: Denies


Suicidal Ideation: Denies


Homicidal Ideation: Denies


Insight/Judgement: Poor


Sleep: Poorly, Difficulty falling asleep


Appetite: Good


Muscle strength/Tone: Normal


Gait/Station: Normal





Psychiatric Findings





- Problem List (Axis 1, 2,3)


(1) Alcohol dependence with uncomplicated withdrawal


Current Visit: Yes   Status: Acute   





(2) Cocaine dependence with withdrawal


Current Visit: Yes   Status: Acute   





(3) Nicotine dependence


Current Visit: Yes   Status: Acute   


Qualifiers: 


   Nicotine product type: cigarettes   Substance use status: in withdrawal   

Qualified Code(s): F17.213 - Nicotine dependence, cigarettes, with withdrawal   





(4) Substance induced mood disorder


Current Visit: Yes   Status: Acute   





(5) Schizoaffective disorder


Current Visit: Yes   Status: Chronic   Comment: As per records.On 

medications.Patient is followed at the [facility] in [facility].   





(6) Insomnia


Current Visit: Yes   Status: Acute   





- Initial Treatment Plan


Initial Treatment Plan: Psychoeducation and support.Records 

revisited.Detoxification in progress.Sleep hygiene.Medications : seroquel 200 

mg po hs + trazodone 100 mg po hs.Ordered.Side effects/benefits discussed with 

the patient.Mr العلي is made aware of risk of priapism,abnormal involuntary 

movements,cardiovascular adverse events,metabolic syndrome in addition to the 

benefits of adherence to medications / maintenance of sobriety.Patient is found 

to be receptive to teaching.Consent (verbal) given for this 

careplan.Observation.

## 2018-02-16 NOTE — DS
[facility] Detox Discharge Summary


Admission Date: 


02/15/18





Discharge Date: 02/16/18





- History


Present History: Alcohol Dependence, Cocaine Dependence


Additional Comments: 


PT DECLINED TO COMPLETE DETOX STATING "I JUST REMEMBERED I HAVE SOME IMPORTANT 

STUFF TO DO. I COULDN'T THINK YESTERDAY". ALERT O X 3. NAD. NO TREMORS. STEADY 

GAIT.


Pertinent Past History: 





SEE DX BELOW





- Physical Exam Results


Vital Signs: 


 Vital Signs











Temperature  98.3 F   02/16/18 13:07


 


Pulse Rate  90   02/16/18 13:07


 


Respiratory Rate  18   02/16/18 13:07


 


Blood Pressure  147/89   02/16/18 13:07


 


O2 Sat by Pulse Oximetry (%)      











Pertinent Admission Physical Exam Findings: 





WITHDRAWAL SX


 Laboratory Last Values











WBC  9.4 K/mm3 (4.0-10.0)   02/15/18  13:30    


 


RBC  4.50 M/mm3 (4.00-5.60)   02/15/18  13:30    


 


Hgb  13.5 GM/dL (11.7-16.9)   02/15/18  13:30    


 


Hct  40.8 % (35.4-49)   02/15/18  13:30    


 


MCV  90.5 fl (80-96)   02/15/18  13:30    


 


MCH  30.1 pg (25.7-33.7)   02/15/18  13:30    


 


MCHC  33.2 g/dl (32.0-35.9)   02/15/18  13:30    


 


RDW  14.3 % (11.9-15.9)   02/15/18  13:30    


 


Plt Count  320 K/MM3 (134-434)  D 02/15/18  13:30    


 


MPV  8.1 fl (7.5-11.1)   02/15/18  13:30    


 


Sodium  140 mmol/L (136-145)   02/15/18  13:30    


 


Potassium  3.8 mmol/L (3.5-5.1)   02/15/18  13:30    


 


Chloride  106 mmol/L ()   02/15/18  13:30    


 


Carbon Dioxide  24 mmol/L (21-32)   02/15/18  13:30    


 


Anion Gap  10  (8-16)   02/15/18  13:30    


 


BUN  17 mg/dL (7-18)  D 02/15/18  13:30    


 


Creatinine  0.9 mg/dL (0.7-1.3)   02/15/18  13:30    


 


Creat Clearance w eGFR  > 60  (>60)   02/15/18  13:30    


 


Random Glucose  117 mg/dL ()  H D 02/15/18  13:30    


 


Calcium  8.8 mg/dL (8.5-10.1)   02/15/18  13:30    


 


Total Bilirubin  0.7 mg/dL (0.2-1.0)  D 02/15/18  13:30    


 


AST  107 U/L (15-37)  H D 02/15/18  13:30    


 


ALT  152 U/L (12-78)  H D 02/15/18  13:30    


 


Alkaline Phosphatase  103 U/L ()  D 02/15/18  13:30    


 


Total Protein  8.5 g/dl (6.4-8.2)  H D 02/15/18  13:30    


 


Albumin  4.1 g/dl (3.4-5.0)  D 02/15/18  13:30    


 


Urine Color  Yellow   02/15/18  14:40    


 


Urine Appearance  Clear   02/15/18  14:40    


 


Urine pH  5.0  (5.0-8.0)   02/15/18  14:40    


 


Ur Specific Gravity  1.023  (1.001-1.035)   02/15/18  14:40    


 


Urine Protein  Negative  (NEGATIVE)   02/15/18  14:40    


 


Urine Glucose (UA)  Negative  (NEGATIVE)   02/15/18  14:40    


 


Urine Ketones  1+  (NEGATIVE)  H  02/15/18  14:40    


 


Urine Blood  Negative  (NEGATIVE)   02/15/18  14:40    


 


Urine Nitrite  Negative  (NEGATIVE)   02/15/18  14:40    


 


Urine Bilirubin  Negative  (NEGATIVE)   02/15/18  14:40    


 


Urine Urobilinogen  4.0 e.u/dl mg/dL (0.2-1.0)   02/15/18  14:40    


 


Ur Leukocyte Esterase  Negative  (NEGATIVE)   02/15/18  14:40    


 


RPR Titer  Nonreactive  (NONREACTIVE)   02/15/18  13:30    














- Treatment


Hospital Course: Discharged Condition Good, Rehab Referral Accepted


Patient has Accepted a Rehab Referral to: REFERRED TO [facility] 





- Medication


Discharge Medications: 


Ambulatory Orders





Quetiapine Fumarate [Seroquel -] 200 mg PO HS #30 tablet 11/24/16 


traZODone HCL [Desyrel -] 150 mg PO HS #30 tablet 11/24/16 


hydrOXYzine PAMOATE [Vistaril -] 100 mg PO HS 02/15/18 











- Diagnosis


(1) Hepatitis C carrier


Current Visit: Yes   Status: Chronic   





(2) Hyperthyroidism


Current Visit: Yes   Status: Chronic   





(3) Nicotine dependence


Current Visit: Yes   Status: Acute   


Qualifiers: 


   Nicotine product type: cigarettes   Substance use status: in withdrawal   

Qualified Code(s): F17.213 - Nicotine dependence, cigarettes, with withdrawal   





(4) Alcohol dependence with uncomplicated withdrawal


Current Visit: Yes   Status: Acute   





(5) Cocaine dependence with withdrawal


Current Visit: Yes   Status: Acute   





(6) Insomnia secondary to depression with anxiety


Current Visit: Yes   Status: Chronic   





- AMA


Did Patient Leave Against Medical Advice: Yes (AMA)

## 2018-02-16 NOTE — PN
[facility] CIWA





- CIWA Score


Nausea/Vomitin-No Nausea/No Vomiting


Muscle Tremors: 4-Moderate,w/Arms Extend


Anxiety: 4-Mod. Anxious/Guarded


Agitation: 4-Moderately Restless


Paroxysmal Sweats: 1-Minimal Palms Moist


Orientation: 0-Oriented


Tacttile Disturbances: 3-Moderate Itch/Numb/Burn


Auditory Disturbances: 0-None


Visual Disturbances: 0-None


Headache: 0-None Present


CIWA-Ar Total Score: 16





[facility]HS Progress Note (SOAP)


Subjective: 





ANXIETY,SWEATS,TREMORS, 


Objective: 





18 11:17


 Vital Signs











Temperature  97.2 F L  18 09:30


 


Pulse Rate  89   18 09:30


 


Respiratory Rate  19   18 09:30


 


Blood Pressure  145/93   18 09:30


 


O2 Sat by Pulse Oximetry (%)      








 Laboratory Last Values











WBC  9.4 K/mm3 (4.0-10.0)   02/15/18  13:30    


 


RBC  4.50 M/mm3 (4.00-5.60)   02/15/18  13:30    


 


Hgb  13.5 GM/dL (11.7-16.9)   02/15/18  13:30    


 


Hct  40.8 % (35.4-49)   02/15/18  13:30    


 


MCV  90.5 fl (80-96)   02/15/18  13:30    


 


MCH  30.1 pg (25.7-33.7)   02/15/18  13:30    


 


MCHC  33.2 g/dl (32.0-35.9)   02/15/18  13:30    


 


RDW  14.3 % (11.9-15.9)   02/15/18  13:30    


 


Plt Count  320 K/MM3 (134-434)  D 02/15/18  13:30    


 


MPV  8.1 fl (7.5-11.1)   02/15/18  13:30    


 


Sodium  140 mmol/L (136-145)   02/15/18  13:30    


 


Potassium  3.8 mmol/L (3.5-5.1)   02/15/18  13:30    


 


Chloride  106 mmol/L ()   02/15/18  13:30    


 


Carbon Dioxide  24 mmol/L (21-32)   02/15/18  13:30    


 


Anion Gap  10  (8-16)   02/15/18  13:30    


 


BUN  17 mg/dL (7-18)  D 02/15/18  13:30    


 


Creatinine  0.9 mg/dL (0.7-1.3)   02/15/18  13:30    


 


Creat Clearance w eGFR  > 60  (>60)   02/15/18  13:30    


 


Random Glucose  117 mg/dL ()  H D 02/15/18  13:30    


 


Calcium  8.8 mg/dL (8.5-10.1)   02/15/18  13:30    


 


Total Bilirubin  0.7 mg/dL (0.2-1.0)  D 02/15/18  13:30    


 


AST  107 U/L (15-37)  H D 02/15/18  13:30    


 


ALT  152 U/L (12-78)  H D 02/15/18  13:30    


 


Alkaline Phosphatase  103 U/L ()  D 02/15/18  13:30    


 


Total Protein  8.5 g/dl (6.4-8.2)  H D 02/15/18  13:30    


 


Albumin  4.1 g/dl (3.4-5.0)  D 02/15/18  13:30    


 


Urine Color  Yellow   02/15/18  14:40    


 


Urine Appearance  Clear   02/15/18  14:40    


 


Urine pH  5.0  (5.0-8.0)   02/15/18  14:40    


 


Ur Specific Gravity  1.023  (1.001-1.035)   02/15/18  14:40    


 


Urine Protein  Negative  (NEGATIVE)   02/15/18  14:40    


 


Urine Glucose (UA)  Negative  (NEGATIVE)   02/15/18  14:40    


 


Urine Ketones  1+  (NEGATIVE)  H  02/15/18  14:40    


 


Urine Blood  Negative  (NEGATIVE)   02/15/18  14:40    


 


Urine Nitrite  Negative  (NEGATIVE)   02/15/18  14:40    


 


Urine Bilirubin  Negative  (NEGATIVE)   02/15/18  14:40    


 


Urine Urobilinogen  4.0 e.u/dl mg/dL (0.2-1.0)   02/15/18  14:40    


 


Ur Leukocyte Esterase  Negative  (NEGATIVE)   02/15/18  14:40    











Assessment: 





18 11:17


WITHDRAWAL SX


Plan: 





CONTINUE DETOX

## 2018-10-02 ENCOUNTER — HOSPITAL ENCOUNTER (INPATIENT)
Dept: HOSPITAL 74 - YASAS | Age: 58
LOS: 3 days | Discharge: HOME | DRG: 897 | End: 2018-10-05
Attending: INTERNAL MEDICINE | Admitting: INTERNAL MEDICINE
Payer: MEDICARE

## 2018-10-02 VITALS — BODY MASS INDEX: 32.5 KG/M2

## 2018-10-02 DIAGNOSIS — F14.20: ICD-10-CM

## 2018-10-02 DIAGNOSIS — G47.00: ICD-10-CM

## 2018-10-02 DIAGNOSIS — F25.9: ICD-10-CM

## 2018-10-02 DIAGNOSIS — F32.9: ICD-10-CM

## 2018-10-02 DIAGNOSIS — F19.24: ICD-10-CM

## 2018-10-02 DIAGNOSIS — F17.213: ICD-10-CM

## 2018-10-02 DIAGNOSIS — Z59.0: ICD-10-CM

## 2018-10-02 DIAGNOSIS — F10.230: Primary | ICD-10-CM

## 2018-10-02 DIAGNOSIS — E05.90: ICD-10-CM

## 2018-10-02 DIAGNOSIS — B18.2: ICD-10-CM

## 2018-10-02 LAB
APPEARANCE UR: CLEAR
BILIRUB UR STRIP.AUTO-MCNC: NEGATIVE MG/DL
COLOR UR: (no result)
KETONES UR QL STRIP: NEGATIVE
LEUKOCYTE ESTERASE UR QL STRIP.AUTO: NEGATIVE
NITRITE UR QL STRIP: NEGATIVE
PH UR: 6 [PH] (ref 5–8)
PROT UR QL STRIP: NEGATIVE
PROT UR QL STRIP: NEGATIVE
SP GR UR: 1 (ref 1–1.03)
UROBILINOGEN UR STRIP-MCNC: NEGATIVE MG/DL (ref 0.2–1)

## 2018-10-02 PROCEDURE — G0008 ADMIN INFLUENZA VIRUS VAC: HCPCS

## 2018-10-02 PROCEDURE — HZ2ZZZZ DETOXIFICATION SERVICES FOR SUBSTANCE ABUSE TREATMENT: ICD-10-PCS | Performed by: SURGERY

## 2018-10-02 RX ADMIN — TRAZODONE HYDROCHLORIDE SCH MG: 50 TABLET ORAL at 22:11

## 2018-10-02 RX ADMIN — ALUMINUM HYDROXIDE, MAGNESIUM HYDROXIDE, AND SIMETHICONE PRN ML: 200; 200; 20 SUSPENSION ORAL at 12:25

## 2018-10-02 RX ADMIN — Medication SCH TAB: at 12:21

## 2018-10-02 RX ADMIN — Medication SCH MG: at 22:11

## 2018-10-02 RX ADMIN — Medication PRN MG: at 22:12

## 2018-10-02 RX ADMIN — NICOTINE POLACRILEX PRN MG: 2 GUM, CHEWING ORAL at 17:14

## 2018-10-02 SDOH — ECONOMIC STABILITY - HOUSING INSECURITY: HOMELESSNESS: Z59.0

## 2018-10-02 NOTE — EKG
Test Reason : 

Blood Pressure : ***/*** mmHG

Vent. Rate : 085 BPM     Atrial Rate : 085 BPM

   P-R Int : 148 ms          QRS Dur : 088 ms

    QT Int : 376 ms       P-R-T Axes : 024 053 028 degrees

   QTc Int : 447 ms

 

NORMAL SINUS RHYTHM

NORMAL ECG

WHEN COMPARED WITH ECG OF 15-FEB-2018 15:11,

NON-SPECIFIC CHANGE IN ST SEGMENT IN INFERIOR LEADS

Confirmed by Maurice Martinez MD ([id number]) on 10/2/2018 4:04:00 PM

 

Referred By:             Confirmed By:Maurice Martinez MD

## 2018-10-02 NOTE — HP
CIWA Score





- CIWA Score


Nausea/Vomitin


Muscle Tremors: 3


Anxiety: 2


Agitation: 2


Paroxysmal Sweats: 1-Minimal Palms Moist


Orientation: 0-Oriented


Tacttile Disturbances: 1-Very Mild Itch/Numbness


Auditory Disturbances: 1-Very Mild


Visual Disturbances: 1-Very Mild Sensitivity


Headache: 2-Mild


CIWA-Ar Total Score: 15





Admission ROS [facility]





- HPI


Chief Complaint: 





i need help to stop drinking alcohol and cocaine


Allergies/Adverse Reactions: 


 Allergies











Allergy/AdvReac Type Severity Reaction Status Date / Time


 


No Known Allergies Allergy   Verified 10/02/18 09:29











History of Present Illness: 





this 58 years old male with alcohol and cocaine dependence,seeking detox,

withdrawal symptom,last detox 02/15/18 to 18 not completed


syncope alcohol related


hepatitiis c


hyperthyriodism ,no medication


history of depression,insomnia





- Ebola screening


Have you traveled outside of the country in the last 21 days: No


Have you had contact with anyone from an Ebola affected area: No


Have you been sick,other than usual withdrawal symptoms: No


Do you have a fever: No





- Review of Systems


Constitutional: Malaise, Night Sweats, Changes in sleep


EENT: reports: Nose Congestion


Respiratory: reports: No Symptoms reported


Cardiac: reports: Palpitations


GI: reports: Nausea, Indigestion, Abdominal cramping


: reports: No Symptoms Reported


Musculoskeletal: reports: Muscle Pain


Integumentary: reports: Dryness


Neuro: reports: Headache, Tremors


Endocrine: reports: No Symptoms Reported


Hematology: reports: No Symptoms Reported


Psychiatric: reports: No Sypmtoms Reported, Judgement Intact, Mood/Affect 

Appropiate, Orientated x3 (insomnia), Depressed





Patient History





- Patient Medical History


Hx Anemia: No


Hx Asthma: No


Hx Chronic Obstructive Pulmonary Disease (COPD): No


Hx Cancer: No


Hx Cardiac Disorders: No


Hx Congestive Heart Failure: No


Hx Hypertension: No


Hx Hypercholesterolemia: No


Hx Pacemaker: No


HX Cerebrovascular Accident: No


Hx Seizures: No


Hx Dementia: No


Hx Diabetes: No


Hx Gastrointestinal Disorders: No


Hx Liver Disease: No


Hx Genitourinary Disorders: No


Hx Sexually Transmitted Disorders: No


Hx Renal Disease (ESRD): No


Hx Thyroid Disease: Yes (HX HYPERTHYROIDISM- NO MED)


Hx Human Immunodeficiency Virus (HIV): No (NEGATIVE HX in )


Hx Hepatitis C: Yes (1 year)


Hx Depression: Yes (INSOMNIA-SEROQUEL,TRAZODONE & VISTARIL)


Hx Suicide Attempt: No (DENIES PREVIOUS AND CURRENT S/H/I TODAY)


Hx Bipolar Disorder: No


Hx Schizophrenia: No (DENIES)


Other Medical History: no suicidal,no homicidal





- Patient Surgical History


Past Surgical History: Yes


Hx Neurologic Surgery: No


Hx Cataract Extraction: No


Hx Cardiac Surgery: No


Hx Lung Surgery: No


Hx Breast Surgery: No


Hx Breast Biopsy: No


Hx Abdominal Surgery: No


Hx Appendectomy: No


Hx Cholecystectomy: No


Hx Genitourinary Surgery: No


Hx  Section: No


Hx Orthopedic Surgery: Yes (RT.HAND 4TH FINGER deformity of right ring figer)


Anesthesia Reaction: No





- PPD History


Previous Implant?: Yes


Date: 10/29/16


Results: 0 mm


PPD to be Administered?: Yes





- Smoking Cessation


Smoking history: Current every day smoker


Have you smoked in the past 12 months: Yes


Aproximately how many cigarettes per day: 5


Cigars Per Day: 0


Hx Chewing Tobacco Use: No


Initiated information on smoking cessation: Yes


'Breaking Loose' booklet given: 10/02/18





- Substance & Tx. History


Hx Alcohol Use: Yes


Hx Substance Use: Yes


Substance Use Type: Alcohol, Cocaine


Hx Substance Use Treatment: Yes ([facility] 02/15/18 to 18 not completed)





- Substances Abused


  ** Crack


Route: Smoking


Frequency: Daily


Amount used: $30


Age of first use: 24


Date of Last Use: 10/01/18





  ** Alcohol-vodka/beer


Route: Oral


Frequency: Daily


Amount used: 2 pts./1-6 pk.


Age of first use: 16


Date of Last Use: 10/01/18





Family Disease History





- Family Disease History


Family Disease History: Other: Father (alcohol), Mother (alcohol), Brother (

alcohol), Sister (alcohol)





Admission Physical Exam [facility]





- Vital Signs


Vital Signs: 


 Vital Signs - 24 hr











  10/02/18





  09:08


 


Temperature 97.7 F


 


Pulse Rate 88


 


Respiratory 18





Rate 


 


Blood Pressure 131/82














- Physical


General Appearance: Yes: Moderate Distress, Tremorous, Irritable, Anxious


HEENTM: Yes: Normal ENT Inspection, LIZET, Pharynx Normal


Respiratory: Yes: Lungs Clear, Normal Breath Sounds, No Respiratory Distress


Neck: Yes: Within Normal Limits, Supple, Trachea in good position


Breast: Yes: Within Normal Limits


Cardiology: Yes: Within Normal Limits, Regular Rhythm, Regular Rate, S1, S2


Abdominal: Yes: Within Normal Limits, Normal Bowel Sounds, Non Tender, Flat, 

Soft


Genitourinary: Yes: Within Normal Limits


Back: Yes: Muscle Spasm


Musculoskeletal: Yes: Back pain, Muscle Pain


Extremities: Yes: Tremors, Other (flexion deprmity of right ring finger scar in 

ring ring finger volar aspect)


Neurological: Yes: CNs II-XII NML intact, Fully Oriented, Alert, Motor Strength 

5/5


Integumentary: Yes: Dry, Other (flexion deformity of right ring figer)


Lymphatic: Yes: Within Normal Limits





- Diagnostic


(1) Alcohol dependence with uncomplicated withdrawal


Current Visit: Yes   Status: Acute   





(2) Hepatitis C


Current Visit: Yes   Status: Acute   





(3) Cocaine dependence with withdrawal


Current Visit: Yes   Status: Acute   





(4) Nicotine dependence


Current Visit: No   Status: Acute   


Qualifiers: 


   Nicotine product type: cigarettes   Substance use status: in withdrawal   

Qualified Code(s): F17.213 - Nicotine dependence, cigarettes, with withdrawal   





(5) Hyperthyroidism


Current Visit: No   Status: Chronic   Comment: PT HX-NO MEDS   





(6) Syncope


Current Visit: Yes   Status: Acute   





(7) Hepatitis C


Current Visit: Yes   Status: Acute   





(8) Depression


Current Visit: Yes   Status: Acute   





(9) Insomnia


Current Visit: Yes   Status: Acute   





Cleared for Admission S





- Detox or Rehab


[facility] Level of Care: Medically Managed


Detox Regimen/Protocol: Librium





[facility] Breath Alcohol Content


Breath Alcohol Content: 0.149





Urine Drug Screen





- Results


Drug Screen Negative: No


Urine Drug Screen Results: MOMO-Cocaine

## 2018-10-02 NOTE — CONSULT
[facility] Psychiatric Consult





- Data


Date of interview: 10/02/18


Admission source: [facility]


Identifying data: This is one of multiple admissions to [facility] for this 58 y/

o  male seeking detox treatment, [place] [address] for alcohol and cocaine 

dependence.Patient is single without children,homeless,unemployed and supported 

on SSI benefits.


Substance Abuse History: Confirmed by patient in this interview.Details in 

current [facility] report : Smoking history: Current every day smoker.  Have you 

smoked in the past 12 months: Yes.  Aproximately how many cigarettes per day: 

5.  Cigars Per Day: 0.  Hx Chewing Tobacco Use: No.  Initiated information on 

smoking cessation: Yes.  'Breaking Loose' booklet given: 10/02/18.  - Substance 

& Tx. History.  Hx Alcohol Use: Yes.  Hx Substance Use: Yes.  Substance Use Type

: Alcohol, Cocaine.  Hx Substance Use Treatment: Yes ([facility] 02/15/18 to 02/16/18 

not completed)


Medical History: Hepatitis C,hyperthyroidism and a history of orthosurgery (

fourth finger of right hand).


Psychiatric History: Onset of psychiatric disturbances (age 24).Patient 

presents with a history of three psychiatric hospitalizations ([facility] at age 24 + [facility] in 2015 + [facility] + 

[facility]).Diagnosed with MDD.Current maintenance regimen consists of 

seroquel 300 mg/hs + trazodone 100 mg/hs+ vistaril 100 mg/hs (self-reprt).Mr العلي declares that he gets psychiatric OPD care at the [facility] 

clinic ([address] [place] [address] in [facility]).Patient denies history of suicide attempts.


Physical/Sexual Abuse/Trauma History: Patient denies history of abuse.Served in 

the [employer] from 1976 to 1980.No reported combat experience.


Additional Comment: Urine Drug Screen Results: MOMO-Cocaine.Noted.





Mental Status Exam





- Mental Status Exam


Alert and Oriented to: Time, Place, Person


Cognitive Function: Good


Patient Appearance: Disheveled


Mood: Nervous, Withdrawn


Affect: Mood Congruent


Patient Behavior: Appropriate, Cooperative


Speech Pattern: Clear, Appropriate


Voice Loudness: Normal


Thought Process: Goal Oriented


Thought Disorder: Not Present


Hallucinations: Denies


Suicidal Ideation: Denies


Homicidal Ideation: Denies


Insight/Judgement: Poor


Sleep: Poorly, Difficulty falling asleep


Appetite: Good


Muscle strength/Tone: Normal


Gait/Station: Normal





Psychiatric Findings





- Problem List (Axis 1, 2,3)


(1) Alcohol dependence with uncomplicated withdrawal


Current Visit: Yes   Status: Acute   





(2) Cocaine dependence with withdrawal


Current Visit: Yes   Status: Acute   





(3) Substance induced mood disorder


Current Visit: Yes   Status: Acute   





(4) Schizoaffective disorder


Current Visit: Yes   Status: Chronic   Comment: As per records.On 

medications.Patient is followed at the [facility] in [facility].   





(5) Insomnia


Current Visit: Yes   Status: Acute   





- Initial Treatment Plan


Initial Treatment Plan: Psychoeducation.Sleep hygiuene.Detoxification in 

progress.Medications : seroquel 200 mg po hs (reduced) + trazodone 50 mg po hs (

reduced).Side effects/benefits of both drugs are discussed with the 

patient.Agrees to this careplan.Observation.

## 2018-10-03 LAB
ALBUMIN SERPL-MCNC: 3.1 G/DL (ref 3.4–5)
ALP SERPL-CCNC: 68 U/L (ref 45–117)
ALT SERPL-CCNC: 91 U/L (ref 13–61)
ANION GAP SERPL CALC-SCNC: 11 MMOL/L (ref 8–16)
AST SERPL-CCNC: 51 U/L (ref 15–37)
BILIRUB SERPL-MCNC: 0.6 MG/DL (ref 0.2–1)
BUN SERPL-MCNC: 12 MG/DL (ref 7–18)
CALCIUM SERPL-MCNC: 8.7 MG/DL (ref 8.5–10.1)
CHLORIDE SERPL-SCNC: 102 MMOL/L (ref 98–107)
CO2 SERPL-SCNC: 24 MMOL/L (ref 21–32)
CREAT SERPL-MCNC: 0.8 MG/DL (ref 0.55–1.3)
DEPRECATED RDW RBC AUTO: 13.3 % (ref 11.9–15.9)
GLUCOSE SERPL-MCNC: 117 MG/DL (ref 74–106)
HCT VFR BLD CALC: 39.4 % (ref 35.4–49)
HGB BLD-MCNC: 13 GM/DL (ref 11.7–16.9)
MCH RBC QN AUTO: 30.4 PG (ref 25.7–33.7)
MCHC RBC AUTO-ENTMCNC: 33 G/DL (ref 32–35.9)
MCV RBC: 92.2 FL (ref 80–96)
PLATELET # BLD AUTO: 234 K/MM3 (ref 134–434)
PMV BLD: 8 FL (ref 7.5–11.1)
POTASSIUM SERPLBLD-SCNC: 4 MMOL/L (ref 3.5–5.1)
PROT SERPL-MCNC: 7.1 G/DL (ref 6.4–8.2)
RBC # BLD AUTO: 4.27 M/MM3 (ref 4–5.6)
SODIUM SERPL-SCNC: 137 MMOL/L (ref 136–145)
WBC # BLD AUTO: 6.4 K/MM3 (ref 4–10)

## 2018-10-03 RX ADMIN — Medication PRN MG: at 22:18

## 2018-10-03 RX ADMIN — Medication SCH TAB: at 10:11

## 2018-10-03 RX ADMIN — NICOTINE POLACRILEX PRN MG: 2 GUM, CHEWING ORAL at 15:21

## 2018-10-03 RX ADMIN — NICOTINE POLACRILEX PRN MG: 2 GUM, CHEWING ORAL at 10:13

## 2018-10-03 RX ADMIN — NICOTINE POLACRILEX PRN MG: 2 GUM, CHEWING ORAL at 22:18

## 2018-10-03 RX ADMIN — TRAZODONE HYDROCHLORIDE SCH MG: 50 TABLET ORAL at 22:16

## 2018-10-03 RX ADMIN — Medication SCH MG: at 22:16

## 2018-10-03 NOTE — PN
S CIWA





- CIWA Score


Nausea/Vomiting: 3


Muscle Tremors: 4-Moderate,w/Arms Extend


Anxiety: 4-Mod. Anxious/Guarded


Agitation: 4-Moderately Restless


Paroxysmal Sweats: 3


Orientation: 0-Oriented


Tacttile Disturbances: 0-None


Auditory Disturbances: 0-None


Visual Disturbances: 0-None


Headache: 1-Very Mild


CIWA-Ar Total Score: 19





[facility] Progress Note (SOAP)


Subjective: 





Upset stomach, tremor, nausea 


Objective: 





10/03/18 13:56


 Last Vital Signs











Temp Pulse Resp BP Pulse Ox


 


 97.7 F   94 H  18   142/92    


 


 10/03/18 13:36  10/03/18 13:36  10/03/18 13:36  10/03/18 13:36   








 Laboratory Tests











  10/02/18 10/02/18 10/03/18





  11:30 16:30 06:00


 


WBC    6.4


 


RBC    4.27


 


Hgb    13.0


 


Hct    39.4


 


MCV    92.2


 


MCH    30.4


 


MCHC    33.0


 


RDW    13.3


 


Plt Count    234  D


 


MPV    8.0


 


Sodium   


 


Potassium   


 


Chloride   


 


Carbon Dioxide   


 


Anion Gap   


 


BUN   


 


Creatinine   


 


Creat Clearance w eGFR   


 


Random Glucose   


 


Calcium   


 


Total Bilirubin   


 


AST   


 


ALT   


 


Alkaline Phosphatase   


 


Total Protein   


 


Albumin   


 


Urine Color   Straw 


 


Urine Appearance   Clear 


 


Urine pH   6.0 


 


Ur Specific Gravity   1.005 


 


Urine Protein   Negative 


 


Urine Glucose (UA)   Negative 


 


Urine Ketones   Negative 


 


Urine Blood   Negative 


 


Urine Nitrite   Negative 


 


Urine Bilirubin   Negative 


 


Urine Urobilinogen   Negative 


 


Ur Leukocyte Esterase   Negative 


 


RPR Titer   


 


HIV 1&2 Antibody Screen  Negative  


 


HIV P24 Antigen  Negative  














  10/03/18 10/03/18





  06:00 06:00


 


WBC  


 


RBC  


 


Hgb  


 


Hct  


 


MCV  


 


MCH  


 


MCHC  


 


RDW  


 


Plt Count  


 


MPV  


 


Sodium  137 


 


Potassium  4.0 


 


Chloride  102 


 


Carbon Dioxide  24 


 


Anion Gap  11 


 


BUN  12 


 


Creatinine  0.8 


 


Creat Clearance w eGFR  > 60 


 


Random Glucose  117 H 


 


Calcium  8.7 


 


Total Bilirubin  0.6 


 


AST  51 H 


 


ALT  91 H 


 


Alkaline Phosphatase  68 


 


Total Protein  7.1 


 


Albumin  3.1 L 


 


Urine Color  


 


Urine Appearance  


 


Urine pH  


 


Ur Specific Gravity  


 


Urine Protein  


 


Urine Glucose (UA)  


 


Urine Ketones  


 


Urine Blood  


 


Urine Nitrite  


 


Urine Bilirubin  


 


Urine Urobilinogen  


 


Ur Leukocyte Esterase  


 


RPR Titer   Nonreactive


 


HIV 1&2 Antibody Screen  


 


HIV P24 Antigen  








Labs reviewed


Assessment: 





10/03/18 13:59


Withdrawal sxs


Plan: 





Continue detox


Encouraged PO water hydration

## 2018-10-04 RX ADMIN — NICOTINE POLACRILEX PRN MG: 2 GUM, CHEWING ORAL at 10:29

## 2018-10-04 RX ADMIN — NICOTINE POLACRILEX PRN MG: 2 GUM, CHEWING ORAL at 22:20

## 2018-10-04 RX ADMIN — NICOTINE POLACRILEX PRN MG: 2 GUM, CHEWING ORAL at 16:49

## 2018-10-04 RX ADMIN — Medication SCH MG: at 22:19

## 2018-10-04 RX ADMIN — NICOTINE POLACRILEX PRN MG: 2 GUM, CHEWING ORAL at 05:40

## 2018-10-04 RX ADMIN — ALUMINUM HYDROXIDE, MAGNESIUM HYDROXIDE, AND SIMETHICONE PRN ML: 200; 200; 20 SUSPENSION ORAL at 16:49

## 2018-10-04 RX ADMIN — Medication SCH TAB: at 10:28

## 2018-10-04 RX ADMIN — TRAZODONE HYDROCHLORIDE SCH MG: 50 TABLET ORAL at 22:19

## 2018-10-04 RX ADMIN — NICOTINE POLACRILEX PRN MG: 2 GUM, CHEWING ORAL at 13:43

## 2018-10-04 RX ADMIN — Medication PRN MG: at 22:20

## 2018-10-04 NOTE — PN
S CIWA





- CIWA Score


Nausea/Vomitin


Muscle Tremors: 3


Anxiety: 3


Agitation: 2


Paroxysmal Sweats: 2


Orientation: 0-Oriented


Tacttile Disturbances: 0-None


Auditory Disturbances: 0-None


Visual Disturbances: 0-None


Headache: 1-Very Mild


CIWA-Ar Total Score: 13





[facility]HS Progress Note (SOAP)


Subjective: 





Stomach ache, tremor


Objective: 





10/04/18 12:56


 Last Vital Signs











Temp Pulse Resp BP Pulse Ox


 


 97.2 F L  78   18   125/80    


 


 10/04/18 09:30  10/04/18 09:30  10/04/18 09:30  10/04/18 09:30   








 Laboratory Tests











  10/02/18 10/02/18 10/03/18





  11:30 16:30 06:00


 


WBC    6.4


 


RBC    4.27


 


Hgb    13.0


 


Hct    39.4


 


MCV    92.2


 


MCH    30.4


 


MCHC    33.0


 


RDW    13.3


 


Plt Count    234  D


 


MPV    8.0


 


Sodium   


 


Potassium   


 


Chloride   


 


Carbon Dioxide   


 


Anion Gap   


 


BUN   


 


Creatinine   


 


Creat Clearance w eGFR   


 


Random Glucose   


 


Calcium   


 


Total Bilirubin   


 


AST   


 


ALT   


 


Alkaline Phosphatase   


 


Total Protein   


 


Albumin   


 


TSH   


 


Resin T3 Uptake   


 


Urine Color   Straw 


 


Urine Appearance   Clear 


 


Urine pH   6.0 


 


Ur Specific Gravity   1.005 


 


Urine Protein   Negative 


 


Urine Glucose (UA)   Negative 


 


Urine Ketones   Negative 


 


Urine Blood   Negative 


 


Urine Nitrite   Negative 


 


Urine Bilirubin   Negative 


 


Urine Urobilinogen   Negative 


 


Ur Leukocyte Esterase   Negative 


 


RPR Titer   


 


HIV 1&2 Antibody Screen  Negative  


 


HIV P24 Antigen  Negative  














  10/03/18 10/03/18 10/04/18





  06:00 06:00 06:00


 


WBC   


 


RBC   


 


Hgb   


 


Hct   


 


MCV   


 


MCH   


 


MCHC   


 


RDW   


 


Plt Count   


 


MPV   


 


Sodium  137  


 


Potassium  4.0  


 


Chloride  102  


 


Carbon Dioxide  24  


 


Anion Gap  11  


 


BUN  12  


 


Creatinine  0.8  


 


Creat Clearance w eGFR  > 60  


 


Random Glucose  117 H  


 


Calcium  8.7  


 


Total Bilirubin  0.6  


 


AST  51 H  


 


ALT  91 H  


 


Alkaline Phosphatase  68  


 


Total Protein  7.1  


 


Albumin  3.1 L  


 


TSH    0.60


 


Resin T3 Uptake    28.8 L


 


Urine Color   


 


Urine Appearance   


 


Urine pH   


 


Ur Specific Gravity   


 


Urine Protein   


 


Urine Glucose (UA)   


 


Urine Ketones   


 


Urine Blood   


 


Urine Nitrite   


 


Urine Bilirubin   


 


Urine Urobilinogen   


 


Ur Leukocyte Esterase   


 


RPR Titer   Nonreactive 


 


HIV 1&2 Antibody Screen   


 


HIV P24 Antigen   








Labs noted


Assessment: 





10/04/18 12:58


Withdrawal sxs


Plan: 





Continue detox


Encouraged PO water hydration

## 2018-10-05 VITALS — DIASTOLIC BLOOD PRESSURE: 76 MMHG | HEART RATE: 70 BPM | TEMPERATURE: 96.8 F | SYSTOLIC BLOOD PRESSURE: 116 MMHG

## 2018-10-05 RX ADMIN — NICOTINE POLACRILEX PRN MG: 2 GUM, CHEWING ORAL at 05:47

## 2018-10-05 NOTE — DS
[facility] Detox Discharge Summary


Admission Date: 


10/02/18





Discharge Date: 10/05/18





- History


Present History: Alcohol Dependence, Cocaine Dependence


Additional Comments: 





Patient demands to leave AMA. Writer met with patient and explained the 

importance of completing his detox. As per patient, he has very important 

things to do because he cannot do it tomorrow and must leave right away. 

Patient agreed to taper librium dose to 10mg and to leave after he received 

10mg of librium. Patient decided to refuse the librium stating it's taking too 

long for pharmacy to verify it and he is not in any withdrawal symptoms and 

feels fine and wants to leave. Patient left without librium dose. Patient 

instructed to proceed to ER stat if any withdrawal symptoms and to see his PCP 

within 3 days. Patient verbalized understanding.


Pertinent Past History: 





Hyperthyroidism





Hepatitis C





- Physical Exam Results


Vital Signs: 


 Vital Signs











Temperature  96.8 F L  10/05/18 06:35


 


Pulse Rate  70   10/05/18 06:35


 


Respiratory Rate  18   10/05/18 06:35


 


Blood Pressure  116/76   10/05/18 06:35


 


O2 Sat by Pulse Oximetry (%)      











Pertinent Admission Physical Exam Findings: 





Withdrawal symptoms





 Laboratory Tests











  10/02/18 10/02/18 10/03/18





  11:30 16:30 06:00


 


WBC    6.4


 


RBC    4.27


 


Hgb    13.0


 


Hct    39.4


 


MCV    92.2


 


MCH    30.4


 


MCHC    33.0


 


RDW    13.3


 


Plt Count    234  D


 


MPV    8.0


 


Sodium   


 


Potassium   


 


Chloride   


 


Carbon Dioxide   


 


Anion Gap   


 


BUN   


 


Creatinine   


 


Creat Clearance w eGFR   


 


Random Glucose   


 


Calcium   


 


Total Bilirubin   


 


AST   


 


ALT   


 


Alkaline Phosphatase   


 


Total Protein   


 


Albumin   


 


TSH   


 


Resin T3 Uptake   


 


Urine Color   Straw 


 


Urine Appearance   Clear 


 


Urine pH   6.0 


 


Ur Specific Gravity   1.005 


 


Urine Protein   Negative 


 


Urine Glucose (UA)   Negative 


 


Urine Ketones   Negative 


 


Urine Blood   Negative 


 


Urine Nitrite   Negative 


 


Urine Bilirubin   Negative 


 


Urine Urobilinogen   Negative 


 


Ur Leukocyte Esterase   Negative 


 


RPR Titer   


 


HIV 1&2 Antibody Screen  Negative  


 


HIV P24 Antigen  Negative  














  10/03/18 10/03/18 10/04/18





  06:00 06:00 06:00


 


WBC   


 


RBC   


 


Hgb   


 


Hct   


 


MCV   


 


MCH   


 


MCHC   


 


RDW   


 


Plt Count   


 


MPV   


 


Sodium  137  


 


Potassium  4.0  


 


Chloride  102  


 


Carbon Dioxide  24  


 


Anion Gap  11  


 


BUN  12  


 


Creatinine  0.8  


 


Creat Clearance w eGFR  > 60  


 


Random Glucose  117 H  


 


Calcium  8.7  


 


Total Bilirubin  0.6  


 


AST  51 H  


 


ALT  91 H  


 


Alkaline Phosphatase  68  


 


Total Protein  7.1  


 


Albumin  3.1 L  


 


TSH    0.60


 


Resin T3 Uptake    28.8 L


 


Urine Color   


 


Urine Appearance   


 


Urine pH   


 


Ur Specific Gravity   


 


Urine Protein   


 


Urine Glucose (UA)   


 


Urine Ketones   


 


Urine Blood   


 


Urine Nitrite   


 


Urine Bilirubin   


 


Urine Urobilinogen   


 


Ur Leukocyte Esterase   


 


RPR Titer   Nonreactive 


 


HIV 1&2 Antibody Screen   


 


HIV P24 Antigen   








Labs reviewed





- Treatment


Hospital Course: Detox Protocol Followed, Detoxed Safely, Responded well, 

Discharged Condition Good





- Medication


Discharge Medications: 


Ambulatory Orders





Quetiapine Fumarate [Seroquel -] 200 mg PO HS #30 tab 10/03/18 


traZODone HCL [Trazodone HCl] 50 mg PO HS #30 tablet 10/03/18 











- Diagnosis


(1) Alcohol dependence with uncomplicated withdrawal


Status: Acute   





(2) Cocaine dependence with withdrawal


Status: Chronic   





(3) Depression


Status: Chronic   





(4) Hepatitis C


Status: Chronic   





(5) Insomnia


Status: Chronic   





(6) Substance induced mood disorder


Status: Acute   





(7) Schizoaffective disorder


Status: Chronic   





(8) Nicotine dependence


Status: Chronic   


Qualifiers: 


   Nicotine product type: cigarettes   Substance use status: in withdrawal   

Qualified Code(s): F17.213 - Nicotine dependence, cigarettes, with withdrawal   





(9) Hyperthyroidism


Status: Chronic   





- AMA


Did Patient Leave Against Medical Advice: No (Proceed to ER stat if w/d sxs, f/

u with your PCP within 3 days)

## 2018-12-12 ENCOUNTER — INPATIENT (INPATIENT)
Facility: HOSPITAL | Age: 58
LOS: 21 days | Discharge: HOME | DRG: 885 | End: 2019-01-03
Attending: PSYCHIATRY & NEUROLOGY | Admitting: PSYCHIATRY & NEUROLOGY
Payer: MEDICARE

## 2018-12-12 VITALS
RESPIRATION RATE: 18 BRPM | TEMPERATURE: 98 F | HEART RATE: 106 BPM | DIASTOLIC BLOOD PRESSURE: 87 MMHG | SYSTOLIC BLOOD PRESSURE: 141 MMHG | OXYGEN SATURATION: 95 % | WEIGHT: 199.96 LBS

## 2018-12-12 PROBLEM — F20.9 SCHIZOPHRENIA, UNSPECIFIED: Chronic | Status: ACTIVE | Noted: 2017-05-12

## 2018-12-12 PROBLEM — B19.20 UNSPECIFIED VIRAL HEPATITIS C WITHOUT HEPATIC COMA: Chronic | Status: ACTIVE | Noted: 2017-05-12

## 2018-12-12 PROBLEM — E03.9 HYPOTHYROIDISM, UNSPECIFIED: Chronic | Status: ACTIVE | Noted: 2017-09-19

## 2018-12-12 PROBLEM — F19.10 OTHER PSYCHOACTIVE SUBSTANCE ABUSE, UNCOMPLICATED: Chronic | Status: ACTIVE | Noted: 2017-05-12

## 2018-12-12 LAB
ALBUMIN SERPL ELPH-MCNC: 4.4 G/DL — SIGNIFICANT CHANGE UP (ref 3.3–5)
ALP SERPL-CCNC: 66 U/L — SIGNIFICANT CHANGE UP (ref 40–120)
ALT FLD-CCNC: 111 U/L — HIGH (ref 10–45)
AMPHET UR-MCNC: NEGATIVE — SIGNIFICANT CHANGE UP
ANION GAP SERPL CALC-SCNC: 17 MMOL/L — SIGNIFICANT CHANGE UP (ref 5–17)
APPEARANCE UR: CLEAR — SIGNIFICANT CHANGE UP
AST SERPL-CCNC: 96 U/L — HIGH (ref 10–40)
BARBITURATES UR SCN-MCNC: NEGATIVE — SIGNIFICANT CHANGE UP
BASOPHILS NFR BLD AUTO: 0.2 % — SIGNIFICANT CHANGE UP (ref 0–2)
BENZODIAZ UR-MCNC: NEGATIVE — SIGNIFICANT CHANGE UP
BILIRUB SERPL-MCNC: 0.7 MG/DL — SIGNIFICANT CHANGE UP (ref 0.2–1.2)
BILIRUB UR-MCNC: NEGATIVE — SIGNIFICANT CHANGE UP
BUN SERPL-MCNC: 15 MG/DL — SIGNIFICANT CHANGE UP (ref 7–23)
CALCIUM SERPL-MCNC: 9.7 MG/DL — SIGNIFICANT CHANGE UP (ref 8.4–10.5)
CHLORIDE SERPL-SCNC: 97 MMOL/L — SIGNIFICANT CHANGE UP (ref 96–108)
CO2 SERPL-SCNC: 22 MMOL/L — SIGNIFICANT CHANGE UP (ref 22–31)
COCAINE METAB.OTHER UR-MCNC: POSITIVE
COLOR SPEC: YELLOW — SIGNIFICANT CHANGE UP
CREAT SERPL-MCNC: 0.84 MG/DL — SIGNIFICANT CHANGE UP (ref 0.5–1.3)
DIFF PNL FLD: NEGATIVE — SIGNIFICANT CHANGE UP
EOSINOPHIL NFR BLD AUTO: 0.3 % — SIGNIFICANT CHANGE UP (ref 0–6)
ETHANOL SERPL-MCNC: <10 MG/DL — SIGNIFICANT CHANGE UP (ref 0–10)
GLUCOSE SERPL-MCNC: 101 MG/DL — HIGH (ref 70–99)
GLUCOSE UR QL: NEGATIVE — SIGNIFICANT CHANGE UP
HCT VFR BLD CALC: 38.7 % — LOW (ref 39–50)
HGB BLD-MCNC: 13.3 G/DL — SIGNIFICANT CHANGE UP (ref 13–17)
KETONES UR-MCNC: NEGATIVE — SIGNIFICANT CHANGE UP
LEUKOCYTE ESTERASE UR-ACNC: NEGATIVE — SIGNIFICANT CHANGE UP
LYMPHOCYTES # BLD AUTO: 14.5 % — SIGNIFICANT CHANGE UP (ref 13–44)
MCHC RBC-ENTMCNC: 31 PG — SIGNIFICANT CHANGE UP (ref 27–34)
MCHC RBC-ENTMCNC: 34.4 G/DL — SIGNIFICANT CHANGE UP (ref 32–36)
MCV RBC AUTO: 90.2 FL — SIGNIFICANT CHANGE UP (ref 80–100)
METHADONE UR-MCNC: NEGATIVE — SIGNIFICANT CHANGE UP
MONOCYTES NFR BLD AUTO: 12.6 % — SIGNIFICANT CHANGE UP (ref 2–14)
NEUTROPHILS NFR BLD AUTO: 72.4 % — SIGNIFICANT CHANGE UP (ref 43–77)
NITRITE UR-MCNC: NEGATIVE — SIGNIFICANT CHANGE UP
OPIATES UR-MCNC: NEGATIVE — SIGNIFICANT CHANGE UP
PCP SPEC-MCNC: SIGNIFICANT CHANGE UP
PCP UR-MCNC: NEGATIVE — SIGNIFICANT CHANGE UP
PH UR: 7 — SIGNIFICANT CHANGE UP (ref 5–8)
PLATELET # BLD AUTO: 249 K/UL — SIGNIFICANT CHANGE UP (ref 150–400)
POTASSIUM SERPL-MCNC: 4.2 MMOL/L — SIGNIFICANT CHANGE UP (ref 3.5–5.3)
POTASSIUM SERPL-SCNC: 4.2 MMOL/L — SIGNIFICANT CHANGE UP (ref 3.5–5.3)
PROT SERPL-MCNC: 8.6 G/DL — HIGH (ref 6–8.3)
PROT UR-MCNC: NEGATIVE MG/DL — SIGNIFICANT CHANGE UP
RBC # BLD: 4.29 M/UL — SIGNIFICANT CHANGE UP (ref 4.2–5.8)
RBC # FLD: 14.4 % — SIGNIFICANT CHANGE UP (ref 10.3–16.9)
SODIUM SERPL-SCNC: 136 MMOL/L — SIGNIFICANT CHANGE UP (ref 135–145)
SP GR SPEC: 1.01 — SIGNIFICANT CHANGE UP (ref 1–1.03)
THC UR QL: NEGATIVE — SIGNIFICANT CHANGE UP
TSH SERPL-MCNC: 0.58 UIU/ML — SIGNIFICANT CHANGE UP (ref 0.35–4.94)
UROBILINOGEN FLD QL: 0.2 E.U./DL — SIGNIFICANT CHANGE UP
WBC # BLD: 10.9 K/UL — HIGH (ref 3.8–10.5)
WBC # FLD AUTO: 10.9 K/UL — HIGH (ref 3.8–10.5)

## 2018-12-12 PROCEDURE — 93010 ELECTROCARDIOGRAM REPORT: CPT

## 2018-12-12 PROCEDURE — 99285 EMERGENCY DEPT VISIT HI MDM: CPT | Mod: 25

## 2018-12-12 PROCEDURE — 90792 PSYCH DIAG EVAL W/MED SRVCS: CPT

## 2018-12-12 RX ORDER — HALOPERIDOL DECANOATE 100 MG/ML
5 INJECTION INTRAMUSCULAR EVERY 6 HOURS
Qty: 0 | Refills: 0 | Status: DISCONTINUED | OUTPATIENT
Start: 2018-12-12 | End: 2019-01-03

## 2018-12-12 RX ORDER — THIAMINE MONONITRATE (VIT B1) 100 MG
100 TABLET ORAL DAILY
Qty: 0 | Refills: 0 | Status: COMPLETED | OUTPATIENT
Start: 2018-12-12 | End: 2018-12-15

## 2018-12-12 RX ORDER — TRAZODONE HCL 50 MG
50 TABLET ORAL AT BEDTIME
Qty: 0 | Refills: 0 | Status: DISCONTINUED | OUTPATIENT
Start: 2018-12-12 | End: 2018-12-24

## 2018-12-12 RX ORDER — NICOTINE POLACRILEX 2 MG
2 GUM BUCCAL EVERY 4 HOURS
Qty: 0 | Refills: 0 | Status: DISCONTINUED | OUTPATIENT
Start: 2018-12-12 | End: 2019-01-03

## 2018-12-12 RX ORDER — FOLIC ACID 0.8 MG
1 TABLET ORAL DAILY
Qty: 0 | Refills: 0 | Status: DISCONTINUED | OUTPATIENT
Start: 2018-12-12 | End: 2019-01-03

## 2018-12-12 RX ORDER — QUETIAPINE FUMARATE 200 MG/1
200 TABLET, FILM COATED ORAL EVERY 12 HOURS
Qty: 0 | Refills: 0 | Status: DISCONTINUED | OUTPATIENT
Start: 2018-12-12 | End: 2018-12-17

## 2018-12-12 RX ADMIN — QUETIAPINE FUMARATE 200 MILLIGRAM(S): 200 TABLET, FILM COATED ORAL at 22:05

## 2018-12-12 RX ADMIN — Medication 50 MILLIGRAM(S): at 22:05
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
## 2018-12-12 NOTE — ED PROVIDER NOTE - DIAGNOSIS COUNSELING, MDM
Well developed Well developed Well developed Well developed conducted a detailed discussion... I had a detailed discussion with the patient and/or guardian regarding the historical points, exam findings, and any diagnostic results supporting the discharge/admit diagnosis.

## 2018-12-12 NOTE — ED PROVIDER NOTE - MEDICAL DECISION MAKING DETAILS
57 yo M with PMH of hypothyroidism, Hep C, schizophrenia, depression p/w hearing voices in his head X 1 week telling him to hurt/ kill himself by jumping in front of a train. States that he is non-compliant with his psych meds- Seroquel/ Trazodone and also does not take his thyroid meds. Denies HI. Admits to alcohol and crack cocaine use- last use was last night. No other complaints. No CP/SOB/ abd pain/N/V/D. 59 yo M with PMH of hypothyroidism, Hep C, schizophrenia, depression p/w hearing voices in his head X 1 week telling him to hurt/ kill himself by jumping in front of a train. States that he is non-compliant with his psych meds- Seroquel/ Trazodone and also does not take his thyroid meds. Denies HI. Admits to alcohol and crack cocaine use- last use was last night. No other complaints. No CP/SOB/ abd pain/N/V/D. Pt seen by psych in ED and admitted to psych. Medically cleared for psych admission.

## 2018-12-12 NOTE — ED BEHAVIORAL HEALTH ASSESSMENT NOTE - DESCRIPTION
Signed voluntary papers for admission to [facility]. No signs of alcohol withdrawal at this time, but  and /87.

## 2018-12-12 NOTE — ED ADULT NURSE NOTE - NSIMPLEMENTINTERV_GEN_ALL_ED
Implemented All Universal Safety Interventions:  [place] to call system. Call bell, personal items and telephone within reach. Instruct patient to call for assistance. Room bathroom lighting operational. Non-slip footwear when patient is off stretcher. Physically safe environment: no spills, clutter or unnecessary equipment. Stretcher in lowest position, wheels locked, appropriate side rails in place.

## 2018-12-12 NOTE — ED ADULT TRIAGE NOTE - OTHER COMPLAINTS
Reports he is currently homeless and started hearing voices 1 week ago. Hx of schizophrenia and depression. Currently on trazadone and seroquel. Denies HI

## 2018-12-12 NOTE — ED PROVIDER NOTE - OBJECTIVE STATEMENT
57 yo M with PMH of hypothyroidism, Hep C, schizophrenia, depression p/w hearing voices in his head X 1 week telling him to hurt/ kill himself by jumping in front of a train. States that he is non-compliant with his psych meds- Seroquel/ Trazodone and also does not take his thyroid meds. Denies HI. Admits to alcohol and crack cocaine use- last use was last night. No other complaints. No CP/SOB/ abd pain/N/V/D.

## 2018-12-13 LAB
CHOLEST SERPL-MCNC: 254 MG/DL — HIGH (ref 10–199)
HDLC SERPL-MCNC: 62 MG/DL — SIGNIFICANT CHANGE UP
LIPID PNL WITH DIRECT LDL SERPL: 156 MG/DL — HIGH
TOTAL CHOLESTEROL/HDL RATIO MEASUREMENT: 4.1 RATIO — SIGNIFICANT CHANGE UP (ref 3.4–9.6)
TRIGL SERPL-MCNC: 182 MG/DL — HIGH (ref 10–149)

## 2018-12-13 PROCEDURE — 99233 SBSQ HOSP IP/OBS HIGH 50: CPT

## 2018-12-13 PROCEDURE — 93010 ELECTROCARDIOGRAM REPORT: CPT

## 2018-12-13 RX ADMIN — Medication 2 MILLIGRAM(S): at 15:30

## 2018-12-13 RX ADMIN — QUETIAPINE FUMARATE 200 MILLIGRAM(S): 200 TABLET, FILM COATED ORAL at 21:17

## 2018-12-13 RX ADMIN — Medication 100 MILLIGRAM(S): at 11:11

## 2018-12-13 RX ADMIN — Medication 1 MILLIGRAM(S): at 11:11

## 2018-12-13 RX ADMIN — Medication 50 MILLIGRAM(S): at 21:19

## 2018-12-13 RX ADMIN — Medication 1 TABLET(S): at 11:11
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
## 2018-12-13 NOTE — BEHAVIORAL HEALTH ASSESSMENT NOTE - RISK ASSESSMENT
Risk factors for suicidality and hospitalization includes substance use (alcohol), unmarried, no dependents, possible previous attempts, and current ideation with chronic mental illness diagnosis.    Protective factors include no access to guns, no family history of attempts, positive support system at safe haven and through family, future-oriented to obtaining new housing, and compliant with medications.    Patient is not an imminent risk to himself at this time.

## 2018-12-13 NOTE — BEHAVIORAL HEALTH ASSESSMENT NOTE - DESCRIPTION (FIRST USE, LAST USE, QUANTITY, FREQUENCY, DURATION)
Last use night before, 3 pints of Bacardi.  History of multiple rehabs in past last in March for detox at [facility], reports relapse after being sober for 6 months (although was in detox 2 months ago)
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
## 2018-12-13 NOTE — BEHAVIORAL HEALTH ASSESSMENT NOTE - SUMMARY
Mr. Dorantes is a 54 year old   with history of paranoid schizophrenia and 3 prior suicide attempts and hospitalizations who presents to the hospital after being found intoxicated with suicidal ideation.  Patient states he has suicidal thoughts and plan, however given collateral patient may be feeling guilty temporarily about his drinking which he engages in monthly, then tries to access a hospital.  Patient's risk factors are chronic, and no acute stressor except drinking has occurred.  Case magaly at [facility] has no acute safety concerns, and his suicidal history is vague.  There are multiple contradictions between his stories to staff and from collateral as well.  Patient would not benefit from acute hospitalization at this time.  Patient does not meet criteria for inpatient hospitalization and [facility]    Plan: Patient and  verbalized understanding to return to ER or call 911 if worsening feelings of suicidality or homicidality occur, or if patient has decreased ability to care for himself.  Patient encouraged to scheduled earlier appointment with his outpatient NP.  He was encouraged to continue taking his medication as prescribed.

## 2018-12-14 DIAGNOSIS — E03.9 HYPOTHYROIDISM, UNSPECIFIED: ICD-10-CM

## 2018-12-14 DIAGNOSIS — B19.21 UNSPECIFIED VIRAL HEPATITIS C WITH HEPATIC COMA: ICD-10-CM

## 2018-12-14 DIAGNOSIS — F20.9 SCHIZOPHRENIA, UNSPECIFIED: ICD-10-CM

## 2018-12-14 DIAGNOSIS — F19.10 OTHER PSYCHOACTIVE SUBSTANCE ABUSE, UNCOMPLICATED: ICD-10-CM

## 2018-12-14 LAB
CHOLEST SERPL-MCNC: 254 MG/DL — HIGH (ref 10–199)
HBA1C BLD-MCNC: 6.1 % — HIGH (ref 4–5.6)
HDLC SERPL-MCNC: 55 MG/DL — SIGNIFICANT CHANGE UP
LIPID PNL WITH DIRECT LDL SERPL: 167 MG/DL — HIGH
TOTAL CHOLESTEROL/HDL RATIO MEASUREMENT: 4.6 RATIO — SIGNIFICANT CHANGE UP (ref 3.4–9.6)
TRIGL SERPL-MCNC: 162 MG/DL — HIGH (ref 10–149)

## 2018-12-14 PROCEDURE — 99232 SBSQ HOSP IP/OBS MODERATE 35: CPT

## 2018-12-14 RX ORDER — GABAPENTIN 400 MG/1
100 CAPSULE ORAL EVERY 8 HOURS
Qty: 0 | Refills: 0 | Status: DISCONTINUED | OUTPATIENT
Start: 2018-12-14 | End: 2019-01-03

## 2018-12-14 RX ADMIN — Medication 100 MILLIGRAM(S): at 11:10

## 2018-12-14 RX ADMIN — Medication 2 MILLIGRAM(S): at 17:50

## 2018-12-14 RX ADMIN — Medication 2 MILLIGRAM(S): at 11:25

## 2018-12-14 RX ADMIN — Medication 2 MILLIGRAM(S): at 22:05

## 2018-12-14 RX ADMIN — HALOPERIDOL DECANOATE 5 MILLIGRAM(S): 100 INJECTION INTRAMUSCULAR at 11:10

## 2018-12-14 RX ADMIN — Medication 50 MILLIGRAM(S): at 21:47

## 2018-12-14 RX ADMIN — Medication 1 TABLET(S): at 11:10

## 2018-12-14 RX ADMIN — QUETIAPINE FUMARATE 200 MILLIGRAM(S): 200 TABLET, FILM COATED ORAL at 21:47

## 2018-12-14 RX ADMIN — Medication 1 MILLIGRAM(S): at 11:10

## 2018-12-14 RX ADMIN — QUETIAPINE FUMARATE 200 MILLIGRAM(S): 200 TABLET, FILM COATED ORAL at 07:35

## 2018-12-15 RX ADMIN — Medication 1 TABLET(S): at 09:56

## 2018-12-15 RX ADMIN — Medication 2 MILLIGRAM(S): at 21:50

## 2018-12-15 RX ADMIN — Medication 2 MILLIGRAM(S): at 17:46

## 2018-12-15 RX ADMIN — Medication 2 MILLIGRAM(S): at 13:03

## 2018-12-15 RX ADMIN — QUETIAPINE FUMARATE 200 MILLIGRAM(S): 200 TABLET, FILM COATED ORAL at 21:50

## 2018-12-15 RX ADMIN — Medication 100 MILLIGRAM(S): at 09:56

## 2018-12-15 RX ADMIN — Medication 2 MILLIGRAM(S): at 08:02

## 2018-12-15 RX ADMIN — QUETIAPINE FUMARATE 200 MILLIGRAM(S): 200 TABLET, FILM COATED ORAL at 08:02

## 2018-12-15 RX ADMIN — Medication 1 MILLIGRAM(S): at 09:56

## 2018-12-15 RX ADMIN — Medication 50 MILLIGRAM(S): at 21:50

## 2018-12-16 RX ADMIN — Medication 1 TABLET(S): at 10:15

## 2018-12-16 RX ADMIN — Medication 2 MILLIGRAM(S): at 13:40

## 2018-12-16 RX ADMIN — QUETIAPINE FUMARATE 200 MILLIGRAM(S): 200 TABLET, FILM COATED ORAL at 21:35

## 2018-12-16 RX ADMIN — QUETIAPINE FUMARATE 200 MILLIGRAM(S): 200 TABLET, FILM COATED ORAL at 10:13

## 2018-12-16 RX ADMIN — Medication 2 MILLIGRAM(S): at 10:12

## 2018-12-16 RX ADMIN — Medication 1 MILLIGRAM(S): at 10:15

## 2018-12-16 RX ADMIN — Medication 50 MILLIGRAM(S): at 21:36

## 2018-12-16 RX ADMIN — Medication 2 MILLIGRAM(S): at 18:30

## 2018-12-17 PROCEDURE — 99232 SBSQ HOSP IP/OBS MODERATE 35: CPT

## 2018-12-17 RX ORDER — QUETIAPINE FUMARATE 200 MG/1
300 TABLET, FILM COATED ORAL
Qty: 0 | Refills: 0 | Status: DISCONTINUED | OUTPATIENT
Start: 2018-12-17 | End: 2018-12-18

## 2018-12-17 RX ADMIN — QUETIAPINE FUMARATE 300 MILLIGRAM(S): 200 TABLET, FILM COATED ORAL at 22:12

## 2018-12-17 RX ADMIN — Medication 2 MILLIGRAM(S): at 09:57

## 2018-12-17 RX ADMIN — Medication 1 TABLET(S): at 09:56

## 2018-12-17 RX ADMIN — Medication 50 MILLIGRAM(S): at 22:12

## 2018-12-17 RX ADMIN — Medication 1 MILLIGRAM(S): at 09:56

## 2018-12-17 RX ADMIN — GABAPENTIN 100 MILLIGRAM(S): 400 CAPSULE ORAL at 22:13

## 2018-12-17 RX ADMIN — QUETIAPINE FUMARATE 200 MILLIGRAM(S): 200 TABLET, FILM COATED ORAL at 09:56

## 2018-12-18 PROCEDURE — 99232 SBSQ HOSP IP/OBS MODERATE 35: CPT

## 2018-12-18 RX ORDER — QUETIAPINE FUMARATE 200 MG/1
400 TABLET, FILM COATED ORAL AT BEDTIME
Qty: 0 | Refills: 0 | Status: DISCONTINUED | OUTPATIENT
Start: 2018-12-18 | End: 2018-12-19

## 2018-12-18 RX ADMIN — Medication 1 MILLIGRAM(S): at 10:29

## 2018-12-18 RX ADMIN — Medication 1 TABLET(S): at 10:29

## 2018-12-18 RX ADMIN — QUETIAPINE FUMARATE 400 MILLIGRAM(S): 200 TABLET, FILM COATED ORAL at 21:34

## 2018-12-18 RX ADMIN — Medication 2 MILLIGRAM(S): at 21:34

## 2018-12-18 RX ADMIN — Medication 50 MILLIGRAM(S): at 21:34

## 2018-12-18 RX ADMIN — Medication 2 MILLIGRAM(S): at 14:19

## 2018-12-18 RX ADMIN — Medication 2 MILLIGRAM(S): at 10:28

## 2018-12-18 RX ADMIN — QUETIAPINE FUMARATE 300 MILLIGRAM(S): 200 TABLET, FILM COATED ORAL at 10:30

## 2018-12-19 PROCEDURE — 99232 SBSQ HOSP IP/OBS MODERATE 35: CPT

## 2018-12-19 RX ORDER — QUETIAPINE FUMARATE 200 MG/1
500 TABLET, FILM COATED ORAL AT BEDTIME
Qty: 0 | Refills: 0 | Status: DISCONTINUED | OUTPATIENT
Start: 2018-12-19 | End: 2018-12-20

## 2018-12-19 RX ADMIN — Medication 1 MILLIGRAM(S): at 10:04

## 2018-12-19 RX ADMIN — Medication 2 MILLIGRAM(S): at 14:08

## 2018-12-19 RX ADMIN — Medication 1 TABLET(S): at 10:04

## 2018-12-19 RX ADMIN — Medication 50 MILLIGRAM(S): at 21:45

## 2018-12-19 RX ADMIN — GABAPENTIN 100 MILLIGRAM(S): 400 CAPSULE ORAL at 10:04

## 2018-12-19 RX ADMIN — QUETIAPINE FUMARATE 500 MILLIGRAM(S): 200 TABLET, FILM COATED ORAL at 21:45

## 2018-12-19 RX ADMIN — Medication 2 MILLIGRAM(S): at 10:05

## 2018-12-20 PROCEDURE — 99232 SBSQ HOSP IP/OBS MODERATE 35: CPT

## 2018-12-20 RX ORDER — QUETIAPINE FUMARATE 200 MG/1
600 TABLET, FILM COATED ORAL AT BEDTIME
Qty: 0 | Refills: 0 | Status: DISCONTINUED | OUTPATIENT
Start: 2018-12-20 | End: 2018-12-24

## 2018-12-20 RX ADMIN — Medication 1 MILLIGRAM(S): at 10:11

## 2018-12-20 RX ADMIN — QUETIAPINE FUMARATE 600 MILLIGRAM(S): 200 TABLET, FILM COATED ORAL at 21:42

## 2018-12-20 RX ADMIN — Medication 50 MILLIGRAM(S): at 21:42

## 2018-12-20 RX ADMIN — Medication 2 MILLIGRAM(S): at 21:42

## 2018-12-20 RX ADMIN — Medication 2 MILLIGRAM(S): at 10:11

## 2018-12-20 RX ADMIN — Medication 1 TABLET(S): at 10:11

## 2018-12-21 PROCEDURE — 99232 SBSQ HOSP IP/OBS MODERATE 35: CPT

## 2018-12-21 RX ADMIN — Medication 1 TABLET(S): at 10:10

## 2018-12-21 RX ADMIN — Medication 50 MILLIGRAM(S): at 21:44

## 2018-12-21 RX ADMIN — Medication 1 MILLIGRAM(S): at 10:10

## 2018-12-21 RX ADMIN — Medication 2 MILLIGRAM(S): at 10:10

## 2018-12-21 RX ADMIN — Medication 2 MILLIGRAM(S): at 19:01

## 2018-12-21 RX ADMIN — GABAPENTIN 100 MILLIGRAM(S): 400 CAPSULE ORAL at 21:45

## 2018-12-21 RX ADMIN — QUETIAPINE FUMARATE 600 MILLIGRAM(S): 200 TABLET, FILM COATED ORAL at 21:44

## 2018-12-22 RX ADMIN — Medication 50 MILLIGRAM(S): at 21:49

## 2018-12-22 RX ADMIN — GABAPENTIN 100 MILLIGRAM(S): 400 CAPSULE ORAL at 21:49

## 2018-12-22 RX ADMIN — Medication 2 MILLIGRAM(S): at 13:11

## 2018-12-22 RX ADMIN — QUETIAPINE FUMARATE 600 MILLIGRAM(S): 200 TABLET, FILM COATED ORAL at 21:49

## 2018-12-22 RX ADMIN — Medication 2 MILLIGRAM(S): at 09:07

## 2018-12-22 RX ADMIN — Medication 2 MILLIGRAM(S): at 21:49

## 2018-12-22 RX ADMIN — Medication 1 MILLIGRAM(S): at 09:07

## 2018-12-22 RX ADMIN — Medication 1 TABLET(S): at 09:07

## 2018-12-23 RX ADMIN — QUETIAPINE FUMARATE 600 MILLIGRAM(S): 200 TABLET, FILM COATED ORAL at 21:39

## 2018-12-23 RX ADMIN — Medication 50 MILLIGRAM(S): at 21:39

## 2018-12-23 RX ADMIN — Medication 1 MILLIGRAM(S): at 10:20

## 2018-12-23 RX ADMIN — HALOPERIDOL DECANOATE 5 MILLIGRAM(S): 100 INJECTION INTRAMUSCULAR at 10:20

## 2018-12-23 RX ADMIN — GABAPENTIN 100 MILLIGRAM(S): 400 CAPSULE ORAL at 10:20

## 2018-12-23 RX ADMIN — Medication 2 MILLIGRAM(S): at 10:20

## 2018-12-23 RX ADMIN — Medication 1 TABLET(S): at 10:22

## 2018-12-23 RX ADMIN — Medication 2 MILLIGRAM(S): at 21:39

## 2018-12-23 RX ADMIN — Medication 2 MILLIGRAM(S): at 14:39

## 2018-12-24 PROCEDURE — 99231 SBSQ HOSP IP/OBS SF/LOW 25: CPT

## 2018-12-24 RX ORDER — TRAZODONE HCL 50 MG
100 TABLET ORAL AT BEDTIME
Qty: 0 | Refills: 0 | Status: DISCONTINUED | OUTPATIENT
Start: 2018-12-24 | End: 2018-12-28

## 2018-12-24 RX ORDER — QUETIAPINE FUMARATE 200 MG/1
700 TABLET, FILM COATED ORAL AT BEDTIME
Qty: 0 | Refills: 0 | Status: DISCONTINUED | OUTPATIENT
Start: 2018-12-24 | End: 2018-12-26

## 2018-12-24 RX ADMIN — Medication 2 MILLIGRAM(S): at 14:12

## 2018-12-24 RX ADMIN — Medication 1 TABLET(S): at 17:54

## 2018-12-24 RX ADMIN — Medication 2 MILLIGRAM(S): at 10:06

## 2018-12-24 RX ADMIN — Medication 2 MILLIGRAM(S): at 22:08

## 2018-12-24 RX ADMIN — Medication 1 MILLIGRAM(S): at 17:54

## 2018-12-24 RX ADMIN — QUETIAPINE FUMARATE 700 MILLIGRAM(S): 200 TABLET, FILM COATED ORAL at 22:10

## 2018-12-24 RX ADMIN — Medication 100 MILLIGRAM(S): at 22:07

## 2018-12-25 RX ADMIN — Medication 2 MILLIGRAM(S): at 15:02

## 2018-12-25 RX ADMIN — Medication 1 MILLIGRAM(S): at 13:31

## 2018-12-25 RX ADMIN — Medication 2 MILLIGRAM(S): at 22:09

## 2018-12-25 RX ADMIN — GABAPENTIN 100 MILLIGRAM(S): 400 CAPSULE ORAL at 23:13

## 2018-12-25 RX ADMIN — QUETIAPINE FUMARATE 700 MILLIGRAM(S): 200 TABLET, FILM COATED ORAL at 22:08

## 2018-12-25 RX ADMIN — Medication 100 MILLIGRAM(S): at 22:08

## 2018-12-25 RX ADMIN — Medication 1 TABLET(S): at 13:31

## 2018-12-26 PROCEDURE — 99231 SBSQ HOSP IP/OBS SF/LOW 25: CPT

## 2018-12-26 RX ORDER — QUETIAPINE FUMARATE 200 MG/1
800 TABLET, FILM COATED ORAL AT BEDTIME
Qty: 0 | Refills: 0 | Status: DISCONTINUED | OUTPATIENT
Start: 2018-12-26 | End: 2019-01-03

## 2018-12-26 RX ADMIN — QUETIAPINE FUMARATE 800 MILLIGRAM(S): 200 TABLET, FILM COATED ORAL at 21:51

## 2018-12-26 RX ADMIN — Medication 1 MILLIGRAM(S): at 10:12

## 2018-12-26 RX ADMIN — GABAPENTIN 100 MILLIGRAM(S): 400 CAPSULE ORAL at 21:53

## 2018-12-26 RX ADMIN — Medication 100 MILLIGRAM(S): at 21:51

## 2018-12-26 RX ADMIN — Medication 2 MILLIGRAM(S): at 21:52

## 2018-12-26 RX ADMIN — Medication 2 MILLIGRAM(S): at 10:12

## 2018-12-26 RX ADMIN — Medication 1 TABLET(S): at 10:12

## 2018-12-26 RX ADMIN — Medication 2 MILLIGRAM(S): at 14:49

## 2018-12-27 PROCEDURE — 99231 SBSQ HOSP IP/OBS SF/LOW 25: CPT

## 2018-12-27 RX ADMIN — Medication 2 MILLIGRAM(S): at 21:51

## 2018-12-27 RX ADMIN — QUETIAPINE FUMARATE 800 MILLIGRAM(S): 200 TABLET, FILM COATED ORAL at 21:52

## 2018-12-27 RX ADMIN — Medication 1 MILLIGRAM(S): at 11:11

## 2018-12-27 RX ADMIN — Medication 1 TABLET(S): at 11:11

## 2018-12-27 RX ADMIN — Medication 2 MILLIGRAM(S): at 15:14

## 2018-12-27 RX ADMIN — Medication 100 MILLIGRAM(S): at 21:52

## 2018-12-27 RX ADMIN — Medication 2 MILLIGRAM(S): at 11:12

## 2018-12-27 RX ADMIN — GABAPENTIN 100 MILLIGRAM(S): 400 CAPSULE ORAL at 21:52

## 2018-12-28 PROCEDURE — 99232 SBSQ HOSP IP/OBS MODERATE 35: CPT

## 2018-12-28 RX ORDER — TRAZODONE HCL 50 MG
150 TABLET ORAL AT BEDTIME
Qty: 0 | Refills: 0 | Status: DISCONTINUED | OUTPATIENT
Start: 2018-12-28 | End: 2019-01-03

## 2018-12-28 RX ADMIN — Medication 2 MILLIGRAM(S): at 10:16

## 2018-12-28 RX ADMIN — QUETIAPINE FUMARATE 800 MILLIGRAM(S): 200 TABLET, FILM COATED ORAL at 22:01

## 2018-12-28 RX ADMIN — Medication 2 MILLIGRAM(S): at 17:43

## 2018-12-28 RX ADMIN — Medication 1 MILLIGRAM(S): at 10:16

## 2018-12-28 RX ADMIN — Medication 150 MILLIGRAM(S): at 22:02

## 2018-12-28 RX ADMIN — Medication 2 MILLIGRAM(S): at 22:02

## 2018-12-28 RX ADMIN — GABAPENTIN 100 MILLIGRAM(S): 400 CAPSULE ORAL at 22:02

## 2018-12-28 RX ADMIN — Medication 1 TABLET(S): at 10:16

## 2018-12-28 NOTE — PROGRESS NOTE BEHAVIORAL HEALTH - NSBHADMITCOUNSEL_PSY_A_CORE
instructions for management, treatment and follow up/risks and benefits of treatment options/diagnostic results/impressions and/or recommended studies/importance of adherence to chosen treatment
importance of adherence to chosen treatment/diagnostic results/impressions and/or recommended studies/risks and benefits of treatment options/instructions for management, treatment and follow up
diagnostic results/impressions and/or recommended studies/importance of adherence to chosen treatment/instructions for management, treatment and follow up/risks and benefits of treatment options
instructions for management, treatment and follow up/diagnostic results/impressions and/or recommended studies/importance of adherence to chosen treatment/risks and benefits of treatment options

## 2018-12-28 NOTE — PROGRESS NOTE BEHAVIORAL HEALTH - NSBHADMITCOORDWITH_PSY_A_CORE
medical staff/social work/other
social work/other/medical staff
medical staff/social work/other
social work/medical staff/other

## 2018-12-28 NOTE — PROGRESS NOTE BEHAVIORAL HEALTH - NSBHADMITCOORDCAREOTHER_PSY_A_CORE FT
nursing, creative arts therapists

## 2018-12-29 RX ADMIN — Medication 2 MILLIGRAM(S): at 09:36

## 2018-12-29 RX ADMIN — Medication 2 MILLIGRAM(S): at 14:03

## 2018-12-29 RX ADMIN — Medication 1 TABLET(S): at 09:36

## 2018-12-29 RX ADMIN — Medication 2 MILLIGRAM(S): at 18:44

## 2018-12-29 RX ADMIN — GABAPENTIN 100 MILLIGRAM(S): 400 CAPSULE ORAL at 21:46

## 2018-12-29 RX ADMIN — Medication 1 MILLIGRAM(S): at 09:36

## 2018-12-29 RX ADMIN — QUETIAPINE FUMARATE 800 MILLIGRAM(S): 200 TABLET, FILM COATED ORAL at 21:45

## 2018-12-29 RX ADMIN — Medication 150 MILLIGRAM(S): at 21:45

## 2018-12-30 RX ADMIN — Medication 2 MILLIGRAM(S): at 09:20

## 2018-12-30 RX ADMIN — Medication 150 MILLIGRAM(S): at 21:46

## 2018-12-30 RX ADMIN — Medication 2 MILLIGRAM(S): at 21:47

## 2018-12-30 RX ADMIN — Medication 1 MILLIGRAM(S): at 09:20

## 2018-12-30 RX ADMIN — GABAPENTIN 100 MILLIGRAM(S): 400 CAPSULE ORAL at 21:47

## 2018-12-30 RX ADMIN — Medication 1 TABLET(S): at 09:20

## 2018-12-30 RX ADMIN — QUETIAPINE FUMARATE 800 MILLIGRAM(S): 200 TABLET, FILM COATED ORAL at 21:47

## 2018-12-31 PROCEDURE — 99232 SBSQ HOSP IP/OBS MODERATE 35: CPT

## 2018-12-31 RX ADMIN — Medication 2 MILLIGRAM(S): at 14:05

## 2018-12-31 RX ADMIN — QUETIAPINE FUMARATE 800 MILLIGRAM(S): 200 TABLET, FILM COATED ORAL at 21:41

## 2018-12-31 RX ADMIN — GABAPENTIN 100 MILLIGRAM(S): 400 CAPSULE ORAL at 21:41

## 2018-12-31 RX ADMIN — Medication 1 MILLIGRAM(S): at 10:30

## 2018-12-31 RX ADMIN — Medication 150 MILLIGRAM(S): at 21:41

## 2018-12-31 RX ADMIN — Medication 2 MILLIGRAM(S): at 10:30

## 2018-12-31 RX ADMIN — Medication 2 MILLIGRAM(S): at 19:30

## 2018-12-31 RX ADMIN — Medication 1 TABLET(S): at 10:30

## 2019-01-01 RX ADMIN — Medication 1 MILLIGRAM(S): at 10:26

## 2019-01-01 RX ADMIN — GABAPENTIN 100 MILLIGRAM(S): 400 CAPSULE ORAL at 21:23

## 2019-01-01 RX ADMIN — Medication 2 MILLIGRAM(S): at 21:23

## 2019-01-01 RX ADMIN — Medication 150 MILLIGRAM(S): at 21:23

## 2019-01-01 RX ADMIN — Medication 1 TABLET(S): at 10:25

## 2019-01-01 RX ADMIN — Medication 2 MILLIGRAM(S): at 10:29

## 2019-01-01 RX ADMIN — QUETIAPINE FUMARATE 800 MILLIGRAM(S): 200 TABLET, FILM COATED ORAL at 21:23

## 2019-01-02 PROCEDURE — 99232 SBSQ HOSP IP/OBS MODERATE 35: CPT

## 2019-01-02 RX ADMIN — Medication 2 MILLIGRAM(S): at 10:16

## 2019-01-02 RX ADMIN — Medication 2 MILLIGRAM(S): at 21:52

## 2019-01-02 RX ADMIN — Medication 1 MILLIGRAM(S): at 10:16

## 2019-01-02 RX ADMIN — Medication 2 MILLIGRAM(S): at 14:22

## 2019-01-02 RX ADMIN — QUETIAPINE FUMARATE 800 MILLIGRAM(S): 200 TABLET, FILM COATED ORAL at 21:52

## 2019-01-02 RX ADMIN — Medication 1 TABLET(S): at 10:16

## 2019-01-02 RX ADMIN — GABAPENTIN 100 MILLIGRAM(S): 400 CAPSULE ORAL at 21:52

## 2019-01-02 RX ADMIN — Medication 150 MILLIGRAM(S): at 21:52

## 2019-01-03 VITALS
SYSTOLIC BLOOD PRESSURE: 138 MMHG | TEMPERATURE: 98 F | HEART RATE: 89 BPM | RESPIRATION RATE: 18 BRPM | DIASTOLIC BLOOD PRESSURE: 88 MMHG

## 2019-01-03 PROCEDURE — 84443 ASSAY THYROID STIM HORMONE: CPT

## 2019-01-03 PROCEDURE — 99238 HOSP IP/OBS DSCHRG MGMT 30/<: CPT

## 2019-01-03 PROCEDURE — 80061 LIPID PANEL: CPT

## 2019-01-03 PROCEDURE — 80307 DRUG TEST PRSMV CHEM ANLYZR: CPT

## 2019-01-03 PROCEDURE — 83036 HEMOGLOBIN GLYCOSYLATED A1C: CPT

## 2019-01-03 PROCEDURE — 93010 ELECTROCARDIOGRAM REPORT: CPT

## 2019-01-03 PROCEDURE — 85025 COMPLETE CBC W/AUTO DIFF WBC: CPT

## 2019-01-03 PROCEDURE — 80053 COMPREHEN METABOLIC PANEL: CPT

## 2019-01-03 PROCEDURE — 80076 HEPATIC FUNCTION PANEL: CPT

## 2019-01-03 PROCEDURE — 36415 COLL VENOUS BLD VENIPUNCTURE: CPT

## 2019-01-03 PROCEDURE — 81003 URINALYSIS AUTO W/O SCOPE: CPT

## 2019-01-03 PROCEDURE — 93005 ELECTROCARDIOGRAM TRACING: CPT

## 2019-01-03 PROCEDURE — 99285 EMERGENCY DEPT VISIT HI MDM: CPT | Mod: 25

## 2019-01-03 RX ORDER — QUETIAPINE FUMARATE 200 MG/1
3 TABLET, FILM COATED ORAL
Qty: 45 | Refills: 0
Start: 2019-01-03 | End: 2019-01-17

## 2019-01-03 RX ORDER — NICOTINE POLACRILEX 2 MG
2 GUM BUCCAL
Qty: 360 | Refills: 0
Start: 2019-01-03 | End: 2019-01-17

## 2019-01-03 RX ORDER — QUETIAPINE FUMARATE 200 MG/1
0 TABLET, FILM COATED ORAL
Qty: 0 | Refills: 0 | COMMUNITY

## 2019-01-03 RX ORDER — TRAZODONE HCL 50 MG
1 TABLET ORAL
Qty: 7 | Refills: 0 | OUTPATIENT
Start: 2019-01-03 | End: 2019-01-09

## 2019-01-03 RX ORDER — QUETIAPINE FUMARATE 200 MG/1
2 TABLET, FILM COATED ORAL
Qty: 30 | Refills: 0 | OUTPATIENT
Start: 2019-01-03 | End: 2019-01-17

## 2019-01-03 RX ORDER — TRAZODONE HCL 50 MG
1 TABLET ORAL
Qty: 15 | Refills: 0
Start: 2019-01-03 | End: 2019-01-17

## 2019-01-03 RX ORDER — TRAZODONE HCL 50 MG
0 TABLET ORAL
Qty: 0 | Refills: 0 | COMMUNITY

## 2019-01-03 RX ORDER — GABAPENTIN 400 MG/1
1 CAPSULE ORAL
Qty: 45 | Refills: 0
Start: 2019-01-03 | End: 2019-01-17

## 2019-01-03 RX ADMIN — Medication 2 MILLIGRAM(S): at 13:19

## 2019-01-03 RX ADMIN — Medication 1 MILLIGRAM(S): at 09:49

## 2019-01-03 RX ADMIN — Medication 1 TABLET(S): at 09:50

## 2019-01-03 RX ADMIN — Medication 2 MILLIGRAM(S): at 09:18

## 2019-01-03 NOTE — PROGRESS NOTE BEHAVIORAL HEALTH - NSBHCHARTREVIEWVS_PSY_A_CORE FT
Vital Signs Last 24 Hrs  T(C): 36.4 (24 Dec 2018 10:47), Max: 36.4 (24 Dec 2018 10:47)  T(F): 97.5 (24 Dec 2018 10:47), Max: 97.5 (24 Dec 2018 10:47)  HR: 90 (24 Dec 2018 10:47) (90 - 93)  BP: 135/88 (24 Dec 2018 10:47) (135/88 - 138/888)  BP(mean): --  RR: 18 (23 Dec 2018 16:34) (18 - 18)  SpO2: --
Vital Signs Last 24 Hrs  T(C): 36.4 (26 Dec 2018 09:59), Max: 36.8 (25 Dec 2018 17:00)  T(F): 97.6 (26 Dec 2018 09:59), Max: 98.3 (25 Dec 2018 17:00)  HR: 89 (26 Dec 2018 09:59) (89 - 98)  BP: 121/85 (26 Dec 2018 09:59) (121/85 - 163/90)  BP(mean): --  RR: 18 (25 Dec 2018 17:00) (18 - 18)  SpO2: --
Vital Signs Last 24 Hrs  T(C): 36.4 (28 Dec 2018 10:01), Max: 36.4 (28 Dec 2018 10:01)  T(F): 97.6 (28 Dec 2018 10:01), Max: 97.6 (28 Dec 2018 10:01)  HR: 90 (28 Dec 2018 10:01) (88 - 90)  BP: 127/88 (28 Dec 2018 10:01) (127/88 - 136/87)  BP(mean): --  RR: 18 (27 Dec 2018 16:55) (18 - 18)  SpO2: --
Vital Signs Last 24 Hrs  T(C): 36.6 (27 Dec 2018 09:31), Max: 36.6 (27 Dec 2018 09:31)  T(F): 97.9 (27 Dec 2018 09:31), Max: 97.9 (27 Dec 2018 09:31)  HR: 61 (27 Dec 2018 09:31) (61 - 92)  BP: 135/89 (27 Dec 2018 09:31) (135/89 - 149/94)  BP(mean): --  RR: 18 (26 Dec 2018 16:35) (18 - 18)  SpO2: --
Vital Signs Last 24 Hrs  T(C): 36.8 (31 Dec 2018 09:07), Max: 36.9 (30 Dec 2018 16:36)  T(F): 98.2 (31 Dec 2018 09:07), Max: 98.5 (30 Dec 2018 16:36)  HR: 94 (31 Dec 2018 09:07) (91 - 97)  BP: 127/85 (31 Dec 2018 09:07) (122/81 - 144/93)  BP(mean): --  RR: 20 (31 Dec 2018 09:07) (18 - 20)  SpO2: --
Vital Signs Last 24 Hrs  T(C): 36.7 (02 Jan 2019 16:37), Max: 36.7 (02 Jan 2019 16:37)  T(F): 98.1 (02 Jan 2019 16:37), Max: 98.1 (02 Jan 2019 16:37)  HR: 99 (02 Jan 2019 16:37) (99 - 99)  BP: 131/84 (02 Jan 2019 16:37) (131/84 - 131/84)  BP(mean): --  RR: 18 (02 Jan 2019 16:37) (18 - 18)  SpO2: --
Vital Signs Last 24 Hrs  T(C): 36.6 (18 Dec 2018 06:06), Max: 37.3 (17 Dec 2018 17:00)  T(F): 97.9 (18 Dec 2018 06:06), Max: 99.1 (17 Dec 2018 17:00)  HR: 81 (18 Dec 2018 06:06) (81 - 94)  BP: 111/70 (18 Dec 2018 06:06) (111/70 - 146/92)  BP(mean): --  RR: 18 (18 Dec 2018 06:06) (18 - 20)  SpO2: --
Vital Signs Last 24 Hrs  T(C): 36.6 (19 Dec 2018 21:00), Max: 36.8 (19 Dec 2018 17:00)  T(F): 97.9 (19 Dec 2018 21:00), Max: 98.2 (19 Dec 2018 17:00)  HR: 98 (19 Dec 2018 21:00) (97 - 98)  BP: 145/94 (19 Dec 2018 21:00) (145/94 - 150/90)  BP(mean): --  RR: 18 (19 Dec 2018 21:00) (18 - 18)  SpO2: --
Vital Signs Last 24 Hrs  T(C): 36.7 (20 Dec 2018 16:40), Max: 36.9 (20 Dec 2018 08:56)  T(F): 98.1 (20 Dec 2018 16:40), Max: 98.4 (20 Dec 2018 08:56)  HR: 89 (20 Dec 2018 16:40) (85 - 89)  BP: 134/86 (20 Dec 2018 16:40) (110/74 - 134/86)  BP(mean): --  RR: 18 (20 Dec 2018 16:40) (18 - 20)  SpO2: --
Vital Signs Last 24 Hrs  T(C): 36.8 (14 Dec 2018 06:24), Max: 37.1 (13 Dec 2018 20:13)  T(F): 98.3 (14 Dec 2018 06:24), Max: 98.8 (13 Dec 2018 20:13)  HR: 102 (14 Dec 2018 06:24) (91 - 102)  BP: 126/86 (14 Dec 2018 06:24) (122/75 - 126/86)  BP(mean): --  RR: 19 (14 Dec 2018 06:24) (18 - 19)  SpO2: --
Vital Signs Last 24 Hrs  T(C): 36.8 (31 Dec 2018 09:07), Max: 36.9 (30 Dec 2018 16:36)  T(F): 98.2 (31 Dec 2018 09:07), Max: 98.5 (30 Dec 2018 16:36)  HR: 94 (31 Dec 2018 09:07) (91 - 97)  BP: 127/85 (31 Dec 2018 09:07) (122/81 - 144/93)  BP(mean): --  RR: 20 (31 Dec 2018 09:07) (18 - 20)  SpO2: --
Vital Signs Last 24 Hrs  T(C): 36.9 (19 Dec 2018 06:00), Max: 36.9 (18 Dec 2018 21:00)  T(F): 98.5 (19 Dec 2018 06:00), Max: 98.5 (19 Dec 2018 06:00)  HR: 80 (19 Dec 2018 06:00) (80 - 102)  BP: 130/86 (19 Dec 2018 06:00) (130/86 - 148/91)  BP(mean): --  RR: 18 (19 Dec 2018 06:00) (18 - 18)  SpO2: --
Vital Signs Last 24 Hrs  T(C): 37.1 (17 Dec 2018 06:00), Max: 37.1 (17 Dec 2018 06:00)  T(F): 98.7 (17 Dec 2018 06:00), Max: 98.7 (17 Dec 2018 06:00)  HR: 86 (17 Dec 2018 06:00) (86 - 98)  BP: 128/82 (17 Dec 2018 06:00) (128/82 - 153/102)  BP(mean): --  RR: 18 (17 Dec 2018 06:00) (18 - 20)  SpO2: --

## 2019-01-03 NOTE — PROGRESS NOTE BEHAVIORAL HEALTH - NSBHFUPINTERVALHXFT_PSY_A_CORE
staff notes he has no behavioral issues, is compliant with meds, slept last night..  pt denies si/hi/vh, states that he intermittently has cahtks and denies intent/plan.  feels safe in the hospital and spontaneously offers that he feels comfortable going to staff if they persist.  he requests increase in seroquel since he has found that it has worked for him in the past. remains visible on the unit.  does not appear to be responding to internal stimuli. MSE:  near the nursing station. ; fair eye contact.   cognition is intact.  deniesVH or s/h i/i/p. +CAHTKS demonstrates normal gait; constricted afect; i&j: fair to poor; accepts treatment; however not reflective on sxs or situation.
staff notes he has no behavioral issues, is compliant with meds, visible on the unit, has constricted affect, reports intermittent derogatory AH, slept last night.  This morning, pt denies si/hi/ah/vh, states that AH have dissipated and he is feeling better.  he requests increase in trazodone to better help him sleep.  feels safe in the hospital and spontaneously offers that he feels ready for dc next week, maybe thurs. he finds that seroquel has worked for him in the past and is working for him now. Remains visible on the unit.  does not appear to be responding to internal stimuli. MSE:  near the nursing station. ; fair eye contact.   cognition is intact.  denies VH, VH, or s/h i/i/p.  demonstrates normal gait; constricted afect; i&j: fair to poor; accepts treatment;
staff notes he has no behavioral issues, is compliant with meds, visible on the unit, slept last night.  pt denies si/hi/vh, states that AH are becoming less quiet and becoming better.  feels safe in the hospital and spontaneously offers that he feels comfortable going to staff if they persist.  he finds that seroquel has worked for him in the past and is working for him now. He hopes to be feelign better and ready for discharge by next week.  remains visible on the unit.  does not appear to be responding to internal stimuli. MSE:  near the nursing station. ; fair eye contact.   cognition is intact.  denies VH or s/h i/i/p. +AH demonstrates normal gait; constricted afect; i&j: fair to poor; accepts treatment; however not reflective on sxs or situation.
no behavioral issues,  compliant with meds, slept last night..  has some AH but no SI ; wants to leave tomorrow . more   visible on the unit.  does not appear to be responding to internal stimuli. MSE:  near the nursing station. ; fair eye contact.   cognition is intact.  d demonstrates normal gait; constricted afect; i&j:  poor; accepts treatment; however not reflective on sxs or situation.
sits watching TV with others; smiliing.  no behavioral issues,  compliant with meds, slept last night..  has some AH but no SI ; wants to leave tomorrow . more   visible on the unit.  does not appear to be responding to internal stimuli. MSE:  near the nursing station. ; fair eye contact.   cognition is intact.  demonstrates normal gait; constricted afect; i&j:  poor; accepts treatment; however not reflective on sxs or situation.
MSE: approaches writer in near the nursing station. ; fair eye contact.   cognition is intact.  denies AVH or s/h i/i/p.  auditory halluinations which comment on the patient;  demonstrates normal gait; appears blocked and intermally preoccupied; shows lability irritabilty;  complains of anxiety;anxious; i&j: fair to poor; accepts treatment; however not reflective on sxs or situation.
staff notes he has no behavioral issues, is paranoid and anxious at times.  pt denies si/hi/vh, states that he intermittently has cahtks and denies intent/plan.  feels safe in the hospital and spontaneously offers that he feels comfortable going to staff if they persist.  he remains visible on the unit.  does not appear to be responding to internal stimuli.  requests an increase in trazodone for insomnia. MSE:  near the nursing station. ; fair eye contact.   cognition is intact.  deniesVH or s/h i/i/p. +CAHTKS demonstrates normal gait; constricted afect; i&j: fair to poor; accepts treatment; however not reflective on sxs or situation.
staff notes he spend much time watching TV in the TV lounge;  no behavioral issues.  MSE:  near the nursing station. ; fair eye contact.   cognition is intact.  denies AVH or s/h i/i/p.  auditory halluinations which comment on the patient;  demonstrates normal gait; appears somewhat  blocked and intermally preoccupied; shows lability irritability;  complains of anxiety;anxious; i&j: fair to poor; accepts treatment; however not reflective on sxs or situation.
MSE:  in the dayroom sitting with others; fair eye contact.   cognition is intact.  denies AVH or s/h i/i/p.  auditory halluinations which comment on the patient;  demonstrates normal gait; appears blocked and intermally preoccupied; shows lability irritabilty;  complains of anxiety;anxious; i&j: fair to poor; accepts treatment; however not reflective on sxs or situation.
staff notes he has no behavioral issues, is compliant with meds, slept last night.. again vague about AH stating he has some but wants to leave on Thurdsay.  Not suicidal.   visible on the unit.  does not appear to be responding to internal stimuli. MSE:  near the nursing station. ; fair eye contact.   cognition is intact.  d demonstrates normal gait; constricted afect; i&j:  poor; accepts treatment; however not reflective on sxs or situation.
staff notes he spend much time watching TV in the TV lounge;  no behavioral issues.  MSE:  near the nursing station. ; fair eye contact.   cognition is intact.  denies AVH or s/h i/i/p.  auditory halluinations which comment on the patient;  demonstrates normal gait; appears somewhat  blocked and intermally preoccupied; shows lability irritability;  complains of anxiety;anxious; i&j: fair to poor; accepts treatment; however not reflective on sxs or situation.

## 2019-01-03 NOTE — PROGRESS NOTE BEHAVIORAL HEALTH - NSBHFUPINTERVALCCFT_PSY_A_CORE
while stating that he hears some voices; no suicidal thoughts however; wants to pay bills and wishes to leave tomorrow;  sleep appetite are good; no pain complaints.
sleep appetite good; no pain issues; discussed need to watch diet given pre-diabetic and elevated cholesterol; counselled regarding cocaine; patient future oriented wants to pay bills.
"I am hearing voices"  wants more Seroquel.   superficially sleep appetite good.  no pain.
Lying in bed smiling inappropriately  :  "I am still  hearing voices"  .;     superficially replies that   sleep appetite are good.  no pain.
Sits near the TV and with little distress states :  "I am still  hearing voices"  .;     superficially replies that   sleep appetite are good.  no pain.
Wants more Seroquel for hallucinations.  superficially sleep appetite good.  no pain.
sits with others watching TV   :  "I am still  hearing voices"  .;     superficially replies that   sleep appetite are good.  no pain.
"I still hear voices"  "I will be ready to leave on Thursday!"
"I am still  hearing voices"  . makes statements with little distress;     superficially sleep appetite good.  no pain.

## 2019-01-03 NOTE — PROGRESS NOTE BEHAVIORAL HEALTH - PROBLEM SELECTOR PROBLEM 3
Drug abuse

## 2019-01-03 NOTE — PROGRESS NOTE BEHAVIORAL HEALTH - NSBHCONSORIP_PSY_A_CORE
Inpatient Admission...

## 2019-01-03 NOTE — PROGRESS NOTE BEHAVIORAL HEALTH - PROBLEM SELECTOR PLAN 4
monitor

## 2019-01-03 NOTE — PROGRESS NOTE BEHAVIORAL HEALTH - NS ED BHA MED ROS EYES
Yes
No complaints
Yes
No complaints

## 2019-01-03 NOTE — PROGRESS NOTE BEHAVIORAL HEALTH - NSBHLEGALSTATUS_PSY_A_CORE
9.13 (Voluntary)

## 2019-01-03 NOTE — PROGRESS NOTE BEHAVIORAL HEALTH - NSBHADMITIPDSM_PSY_A_CORE
see above for Axis I, II, III

## 2019-01-03 NOTE — PROGRESS NOTE BEHAVIORAL HEALTH - NSBHADMITIPREASON_PSY_A_CORE
Danger to self; mental illness expected to respond to inpatient care

## 2019-01-03 NOTE — PROGRESS NOTE BEHAVIORAL HEALTH - AFFECT QUALITY
Euthymic
Euthymic
Anxious
Euthymic
Anxious
Euthymic
Euthymic

## 2019-01-03 NOTE — PROGRESS NOTE BEHAVIORAL HEALTH - PROBLEM SELECTOR PLAN 2
assess current status ; med endo consult

## 2019-01-03 NOTE — PROGRESS NOTE BEHAVIORAL HEALTH - AXIS III
Hepatitis C, hyperthyroidism

## 2019-01-03 NOTE — PROGRESS NOTE BEHAVIORAL HEALTH - SECONDARY DX2
Malingering

## 2019-01-03 NOTE — PROGRESS NOTE BEHAVIORAL HEALTH - SECONDARY DX1
Schizophrenia, paranoid type

## 2019-01-03 NOTE — PROGRESS NOTE BEHAVIORAL HEALTH - MUSCLE TONE / STRENGTH
Normal muscle tone/strength

## 2019-01-03 NOTE — PROGRESS NOTE BEHAVIORAL HEALTH - NS ED BHA MSE SPEECH RATE
Normal
Normal
Fast, difficult to interrupt
Normal
Fast, difficult to interrupt
Normal

## 2019-01-03 NOTE — PROGRESS NOTE BEHAVIORAL HEALTH - THOUGHT PROCESS
Linear
Linear
Perseverative
Linear
Perseverative
Linear
Linear
Perseverative

## 2019-01-03 NOTE — PROGRESS NOTE BEHAVIORAL HEALTH - NSBHADMITIPOBSFT_PSY_A_CORE
makes needs known

## 2019-01-03 NOTE — PROGRESS NOTE BEHAVIORAL HEALTH - NSBHADMITIPOBS_PSY_A_CORE
Routine observation

## 2019-01-03 NOTE — PROGRESS NOTE BEHAVIORAL HEALTH - PRIMARY DX
Alcohol abuse

## 2019-01-03 NOTE — PROGRESS NOTE BEHAVIORAL HEALTH - THOUGHT CONTENT
Unremarkable
Unremarkable
Guilt/Ruminations/Suicidality
Guilt/Ruminations/Suicidality
Guilt/Suicidality/Ruminations
Suicidality/Ruminations/Guilt
Suicidality/Ruminations/Guilt
Ruminations/Suicidality
Guilt/Suicidality/Ruminations
Unremarkable
Ruminations/Suicidality
Unremarkable
Unremarkable

## 2019-01-03 NOTE — PROGRESS NOTE BEHAVIORAL HEALTH - NSBHCHARTREVIEWLAB_PSY_A_CORE FT
Hemoglobin A1C, Whole Blood in AM (12.14.18 @ 06:50) ;   Hemoglobin A1C, Whole Blood: 6.1: Reference Range                 4.0-5.6%  Lipid Profile in AM (12.14.18 @ 06:50)    Total Cholesterol/HDL Ratio Measurement: 4.6 RATIO    Cholesterol, Serum: 254 mg/dL    Triglycerides, Serum: 162 mg/dL    HDL Cholesterol, Serum: 55:

## 2019-01-03 NOTE — PROGRESS NOTE BEHAVIORAL HEALTH - PROBLEM SELECTOR PROBLEM 4
Hepatitis C virus infection with hepatic coma, unspecified chronicity

## 2019-01-03 NOTE — PROGRESS NOTE BEHAVIORAL HEALTH - RISK ASSESSMENT
Risk elements: uses alcohol; history of schizophrenia; no place to live (undomiciled); signed in as a voluntary; issues with rehabilitation program; not working or having regular daily routine; has made suicide attempt in past; came on own; walk-in;     Static: substance abuse/dependance; chronic psychiatric disorder; lack of support and housing; may be under financial stress; past suicide attempts; possibly isolated;     Modifiable: substance abuse counselling and treatment and need for referral; treatment of psychotic sxs; improve support and resources for housing; address substance dependency issues; help  focus on personal goals and structurd daily activities; assess/treat underlying mood issues; improve interpersonal engagement via groups and therapy;     Protective: seeks help; actively  seeking help;   Modifiable factors addressed in treatment plan; see summary and interval data for updates

## 2019-01-03 NOTE — PROGRESS NOTE BEHAVIORAL HEALTH - NSBHATTESTSEENBY_PSY_A_CORE
attending Psychiatrist without NP/Trainee

## 2019-01-03 NOTE — PROGRESS NOTE BEHAVIORAL HEALTH - NS ED BHA MED ROS PSYCHIATRIC
See HPI

## 2019-01-03 NOTE — PROGRESS NOTE BEHAVIORAL HEALTH - NSBHPTASSESSDT_PSY_A_CORE
14-Dec-2018 08:07
17-Dec-2018 08:38
18-Dec-2018 08:10
19-Dec-2018 08:15
21-Dec-2018 08:20
26-Dec-2018 08:20
27-Dec-2018 10:20
28-Dec-2018 10:20
20-Dec-2018 08:42
31-Dec-2018 08:46
03-Jan-2019 08:39
02-Jan-2019 08:09
24-Dec-2018 08:20

## 2019-01-03 NOTE — PROGRESS NOTE BEHAVIORAL HEALTH - BEHAVIOR
Cooperative

## 2019-01-03 NOTE — PROGRESS NOTE BEHAVIORAL HEALTH - PROBLEM SELECTOR PROBLEM 1
Schizophrenia, unspecified type

## 2019-01-03 NOTE — PROGRESS NOTE BEHAVIORAL HEALTH - NSBHADMITDANGERSELF_PSY_A_CORE
suicidal ideation with plan and means

## 2019-01-06 DIAGNOSIS — G47.00 INSOMNIA, UNSPECIFIED: ICD-10-CM

## 2019-01-06 DIAGNOSIS — Z91.14 PATIENT'S OTHER NONCOMPLIANCE WITH MEDICATION REGIMEN: ICD-10-CM

## 2019-01-06 DIAGNOSIS — F20.0 PARANOID SCHIZOPHRENIA: ICD-10-CM

## 2019-01-06 DIAGNOSIS — R45.851 SUICIDAL IDEATIONS: ICD-10-CM

## 2019-01-06 DIAGNOSIS — F32.9 MAJOR DEPRESSIVE DISORDER, SINGLE EPISODE, UNSPECIFIED: ICD-10-CM

## 2019-01-06 DIAGNOSIS — Z76.5 MALINGERER [CONSCIOUS SIMULATION]: ICD-10-CM

## 2019-01-06 DIAGNOSIS — Z91.5 PERSONAL HISTORY OF SELF-HARM: ICD-10-CM

## 2019-01-06 DIAGNOSIS — E03.9 HYPOTHYROIDISM, UNSPECIFIED: ICD-10-CM

## 2019-01-06 DIAGNOSIS — F10.10 ALCOHOL ABUSE, UNCOMPLICATED: ICD-10-CM

## 2019-01-06 DIAGNOSIS — F17.200 NICOTINE DEPENDENCE, UNSPECIFIED, UNCOMPLICATED: ICD-10-CM

## 2019-01-06 DIAGNOSIS — B19.20 UNSPECIFIED VIRAL HEPATITIS C WITHOUT HEPATIC COMA: ICD-10-CM

## 2019-01-06 DIAGNOSIS — F14.90 COCAINE USE, UNSPECIFIED, UNCOMPLICATED: ICD-10-CM

## 2019-01-06 DIAGNOSIS — Z59.0 HOMELESSNESS: ICD-10-CM

## 2019-01-06 SDOH — ECONOMIC STABILITY - HOUSING INSECURITY: HOMELESSNESS: Z59.0

## 2019-01-09 NOTE — ED PROVIDER NOTE - DISPOSITION TYPE
[FreeTextEntry1] : 7/18: A1c 6.1%\par 12/17: A1c 6.3%, tot chol 180, trig 69, , LDL 57, urine microalb/cr 3, TSH 0.94\par 8/17: A1c 6.8%, urine microalb/cr 11\par 10/16: A1c 6.9%, tot chol 173, trig 66, HDL 91, LDL 69, TSH 1.31, Tg Ab 19, TPO < 28, 25-D 26.2 ADMIT

## 2019-03-02 ENCOUNTER — EMERGENCY (EMERGENCY)
Facility: HOSPITAL | Age: 59
LOS: 1 days | Discharge: HOME | End: 2019-03-02
Attending: EMERGENCY MEDICINE | Admitting: EMERGENCY MEDICINE
Payer: MEDICARE

## 2019-03-02 VITALS
DIASTOLIC BLOOD PRESSURE: 77 MMHG | TEMPERATURE: 98 F | OXYGEN SATURATION: 95 % | RESPIRATION RATE: 16 BRPM | HEART RATE: 103 BPM | SYSTOLIC BLOOD PRESSURE: 157 MMHG

## 2019-03-02 VITALS
OXYGEN SATURATION: 95 % | HEART RATE: 102 BPM | DIASTOLIC BLOOD PRESSURE: 64 MMHG | SYSTOLIC BLOOD PRESSURE: 107 MMHG | WEIGHT: 199.96 LBS | RESPIRATION RATE: 16 BRPM | TEMPERATURE: 98 F

## 2019-03-02 DIAGNOSIS — F17.210 NICOTINE DEPENDENCE, CIGARETTES, UNCOMPLICATED: ICD-10-CM

## 2019-03-02 DIAGNOSIS — R44.3 HALLUCINATIONS, UNSPECIFIED: ICD-10-CM

## 2019-03-02 DIAGNOSIS — E05.90 THYROTOXICOSIS, UNSPECIFIED WITHOUT THYROTOXIC CRISIS OR STORM: ICD-10-CM

## 2019-03-02 DIAGNOSIS — E03.9 HYPOTHYROIDISM, UNSPECIFIED: ICD-10-CM

## 2019-03-02 DIAGNOSIS — Z79.899 OTHER LONG TERM (CURRENT) DRUG THERAPY: ICD-10-CM

## 2019-03-02 LAB
ALBUMIN SERPL ELPH-MCNC: 4.2 G/DL — SIGNIFICANT CHANGE UP (ref 3.3–5)
ALP SERPL-CCNC: 65 U/L — SIGNIFICANT CHANGE UP (ref 40–120)
ALT FLD-CCNC: 113 U/L — HIGH (ref 10–45)
ANION GAP SERPL CALC-SCNC: 17 MMOL/L — SIGNIFICANT CHANGE UP (ref 5–17)
APAP SERPL-MCNC: <5 UG/ML — LOW (ref 10–30)
APPEARANCE UR: CLEAR — SIGNIFICANT CHANGE UP
AST SERPL-CCNC: 118 U/L — HIGH (ref 10–40)
BASOPHILS # BLD AUTO: 0.03 K/UL — SIGNIFICANT CHANGE UP (ref 0–0.2)
BASOPHILS NFR BLD AUTO: 0.2 % — SIGNIFICANT CHANGE UP (ref 0–2)
BILIRUB SERPL-MCNC: 0.5 MG/DL — SIGNIFICANT CHANGE UP (ref 0.2–1.2)
BILIRUB UR-MCNC: NEGATIVE — SIGNIFICANT CHANGE UP
BUN SERPL-MCNC: 15 MG/DL — SIGNIFICANT CHANGE UP (ref 7–23)
CALCIUM SERPL-MCNC: 9.6 MG/DL — SIGNIFICANT CHANGE UP (ref 8.4–10.5)
CHLORIDE SERPL-SCNC: 100 MMOL/L — SIGNIFICANT CHANGE UP (ref 96–108)
CO2 SERPL-SCNC: 19 MMOL/L — LOW (ref 22–31)
COLOR SPEC: YELLOW — SIGNIFICANT CHANGE UP
CREAT SERPL-MCNC: 0.77 MG/DL — SIGNIFICANT CHANGE UP (ref 0.5–1.3)
DIFF PNL FLD: ABNORMAL
EOSINOPHIL # BLD AUTO: 0.01 K/UL — SIGNIFICANT CHANGE UP (ref 0–0.5)
EOSINOPHIL NFR BLD AUTO: 0.1 % — SIGNIFICANT CHANGE UP (ref 0–6)
ETHANOL SERPL-MCNC: 20 MG/DL — HIGH (ref 0–10)
GLUCOSE SERPL-MCNC: 177 MG/DL — HIGH (ref 70–99)
GLUCOSE UR QL: 250
HCT VFR BLD CALC: 35.7 % — LOW (ref 39–50)
HGB BLD-MCNC: 12.6 G/DL — LOW (ref 13–17)
IMM GRANULOCYTES NFR BLD AUTO: 0.3 % — SIGNIFICANT CHANGE UP (ref 0–1.5)
KETONES UR-MCNC: 15 MG/DL
LEUKOCYTE ESTERASE UR-ACNC: NEGATIVE — SIGNIFICANT CHANGE UP
LYMPHOCYTES # BLD AUTO: 1.41 K/UL — SIGNIFICANT CHANGE UP (ref 1–3.3)
LYMPHOCYTES # BLD AUTO: 9.3 % — LOW (ref 13–44)
MCHC RBC-ENTMCNC: 32 PG — SIGNIFICANT CHANGE UP (ref 27–34)
MCHC RBC-ENTMCNC: 35.3 GM/DL — SIGNIFICANT CHANGE UP (ref 32–36)
MCV RBC AUTO: 90.6 FL — SIGNIFICANT CHANGE UP (ref 80–100)
MONOCYTES # BLD AUTO: 0.92 K/UL — HIGH (ref 0–0.9)
MONOCYTES NFR BLD AUTO: 6.1 % — SIGNIFICANT CHANGE UP (ref 2–14)
NEUTROPHILS # BLD AUTO: 12.68 K/UL — HIGH (ref 1.8–7.4)
NEUTROPHILS NFR BLD AUTO: 84 % — HIGH (ref 43–77)
NITRITE UR-MCNC: NEGATIVE — SIGNIFICANT CHANGE UP
NRBC # BLD: 0 /100 WBCS — SIGNIFICANT CHANGE UP (ref 0–0)
PCP SPEC-MCNC: SIGNIFICANT CHANGE UP
PH UR: 6 — SIGNIFICANT CHANGE UP (ref 5–8)
PLATELET # BLD AUTO: 265 K/UL — SIGNIFICANT CHANGE UP (ref 150–400)
POTASSIUM SERPL-MCNC: 4.3 MMOL/L — SIGNIFICANT CHANGE UP (ref 3.5–5.3)
POTASSIUM SERPL-SCNC: 4.3 MMOL/L — SIGNIFICANT CHANGE UP (ref 3.5–5.3)
PROT SERPL-MCNC: 8.3 G/DL — SIGNIFICANT CHANGE UP (ref 6–8.3)
PROT UR-MCNC: 30 MG/DL
RBC # BLD: 3.94 M/UL — LOW (ref 4.2–5.8)
RBC # FLD: 13.2 % — SIGNIFICANT CHANGE UP (ref 10.3–14.5)
SALICYLATES SERPL-MCNC: 0.3 MG/DL — LOW (ref 2.8–20)
SODIUM SERPL-SCNC: 136 MMOL/L — SIGNIFICANT CHANGE UP (ref 135–145)
SP GR SPEC: 1.02 — SIGNIFICANT CHANGE UP (ref 1–1.03)
TSH SERPL-MCNC: 0.43 UIU/ML — SIGNIFICANT CHANGE UP (ref 0.35–4.94)
UROBILINOGEN FLD QL: 0.2 E.U./DL — SIGNIFICANT CHANGE UP
WBC # BLD: 15.09 K/UL — HIGH (ref 3.8–10.5)
WBC # FLD AUTO: 15.09 K/UL — HIGH (ref 3.8–10.5)

## 2019-03-02 PROCEDURE — 93005 ELECTROCARDIOGRAM TRACING: CPT

## 2019-03-02 PROCEDURE — 99285 EMERGENCY DEPT VISIT HI MDM: CPT | Mod: 25

## 2019-03-02 PROCEDURE — 85025 COMPLETE CBC W/AUTO DIFF WBC: CPT

## 2019-03-02 PROCEDURE — 80307 DRUG TEST PRSMV CHEM ANLYZR: CPT

## 2019-03-02 PROCEDURE — 80053 COMPREHEN METABOLIC PANEL: CPT

## 2019-03-02 PROCEDURE — 81001 URINALYSIS AUTO W/SCOPE: CPT

## 2019-03-02 PROCEDURE — 36415 COLL VENOUS BLD VENIPUNCTURE: CPT

## 2019-03-02 PROCEDURE — 84443 ASSAY THYROID STIM HORMONE: CPT

## 2019-03-02 PROCEDURE — 93010 ELECTROCARDIOGRAM REPORT: CPT

## 2019-03-02 PROCEDURE — 99284 EMERGENCY DEPT VISIT MOD MDM: CPT | Mod: 25

## 2019-03-02 RX ORDER — QUETIAPINE FUMARATE 200 MG/1
200 TABLET, FILM COATED ORAL ONCE
Qty: 0 | Refills: 0 | Status: DISCONTINUED | OUTPATIENT
Start: 2019-03-02 | End: 2019-03-06

## 2019-03-02 RX ADMIN — Medication 2 MILLIGRAM(S): at 20:50

## 2019-03-02 NOTE — ED PROVIDER NOTE - PLAN OF CARE
57yo M with history of schizophrenia, polysubstance abuse, hepatitis C and hyperthyroidism presents with 2 days of command auditory hallucinations and suicidal ideation, also with nausea and vomiting, possible alcohol withdrawal. Patient is calm and cooperative at this time. Will order Ativan for withdrawal symptoms. Pt likely requires psychiatric admission, will consult psychiatry.

## 2019-03-02 NOTE — ED ADULT TRIAGE NOTE - CHIEF COMPLAINT QUOTE
Pt w hx of schizophrenia presents stating " I am hearing voices telling me to kill myself, jump in front of the train" x two days, reports prev attempt about a month ago by overdose, admits to alcohol and crack cocaine use last night. Pt w flat affect, cooperative.

## 2019-03-02 NOTE — ED ADULT NURSE NOTE - OBJECTIVE STATEMENT
Pt w hx of schizophrenia presents stating " I am hearing voices telling me to kill myself, jump in front of the train" x two days, reports prev attempt about a month ago by overdose, admits to alcohol and crack cocaine use last night. Pt w flat affect, cooperative.  Pt has current plan to OD.  Pt is currently calm and cooperative with care, flat affect.  Pt denies any pain, chest pain, abd pain, /GI symptoms at this time.  Alert and oriented. Pt w hx of schizophrenia presents stating " I am hearing voices telling me to kill myself, jump in front of the train" x two days, reports prev attempt about a month ago by overdose, admits to alcohol and crack cocaine use last night. Pt w flat affect, cooperative.  Pt has current plan to OD.  Pt is currently calm and cooperative with care, flat affect.  Pt denies any pain, chest pain, abd pain, /GI symptoms at this time.  Alert and oriented x3, No HI.  No slurring of speech, speaking in complete, clear sentences.  No facial asymmetry.

## 2019-03-02 NOTE — ED PROVIDER NOTE - OBJECTIVE STATEMENT
Pt is a 57yo M with PMH schizophrenia, polysubstance abuse, hepatitis C, hyperthyroidism who presents with 2 days of command auditory hallucinations to jump in front of a train. Pt states that he has chronic schizophrenia, has heard a male voice telling him to jump in front of a train over the last 2 days. Decided to present to the ED because he thinks he would follow through on this. Pt denies visual hallucinations, homicidal ideation/intent/plan. Pt also reports daily crack cocaine use, drinks up to 2 pints of vodka/day, smokes 5 cigarettes/day. Most recent use was last night- drank 3 pints of vodka and used crack. Pt reports nausea and vomiting today, believes he may be withdrawing from alcohol.

## 2019-03-02 NOTE — ED BEHAVIORAL HEALTH ASSESSMENT NOTE - SUMMARY
Mr. Dorantes is a 58 year old man, street undomiciled, single without children, unemployed  , with a medical history of hypothyroidism, hepatitis C, with a psychiatric history of history of schizophrenia paranoid type, alcohol, cannabis, and (crack) cocaine use disorders, self reported history of 3 suicide attempts in past by overdose (pt reports 4-5 tabs of Quetiapine), history of 4 prior hospitalizations last at [facility] Dec 2018, history of alcohol abuse with withdrawal tremors but no reported seizures or delirium tremens, no known history of arrests or legal issues, no history of violence or aggression, BIBS with complaints of command auditory hallucinations to jump in front of a train x 1 week without intent in the setting of medication non compliance x 1 week, daily alcohol use, and recent crack cocaine and cannabis use.    On exam, the patient was calm, cooperative, organized, well related, with normal speech and a constricted affect. Utox positive for cocaine and THC. BAL 20. He did not appear psychotic, depressed, anxious, manic, or intoxicated. Patient's presentation is consistent with multiple previous presentations of CAH and SI in the setting of substance use and housing stressors. During previous hospitalization, patient's presentation was inconsistent with his report, raising concern for malingering. Multiple sources of collateral contradict patient's narrative and support concern for secondary gain of admission due to recent substance use and because of housing difficulties. Patient reports presenting to multiple hospitals to gain admissions. Though patient has a chronic elevated risk of harm to self given reported past attempts (at this time, reported attempts not supported by collateral), hospitalizations, substance use, male gender, long standing psychiatric illness, the patient appears to be at his baseline. Patient's chronic risk would not be mitigated by an inpatient psychiatric and should continue treatment in the community.    Plan  -Discharge with list of shelter referrals  -Provide one time dose of Quetiapine 200 mg  -Patient to follow up at [facility] walk in clinic on Monday March 4th, 2019.

## 2019-03-02 NOTE — ED PROVIDER NOTE - ATTENDING CONTRIBUTION TO CARE
pt seen by me, key points of case dw med student.  57 yo male with hx of schizophrenia, alcohol and drug abuse co a few days of hearing voices, thoughts of suicide, thoughts of jumping in front of a train.  was drinking a lot yesterday, 3 pints of vodka, last drink last night.  was admitted for a few weeks in December to the psych unit at [facility].  on exam, cooperative, mildly anxious, heart and lungs normal, neuro normal.  +suicidal ideation +auditory hallucinations.  may be having some mild alcohol withdrawal, will give ativan, check psych clearance labs and call psych consult pt seen by me, key points of case dw med student.  57 yo male with hx of schizophrenia, alcohol and drug abuse co a few days of hearing voices, thoughts of suicide, thoughts of jumping in front of a train.  was drinking a lot yesterday, 3 pints of vodka, last drink last night.  was admitted for a few weeks in December to the psych unit at [facility].  on exam, cooperative, mildly anxious, heart and lungs normal, neuro normal.  +suicidal ideation +auditory hallucinations.  may be having some mild alcohol withdrawal, will give ativan, check psych clearance labs and call psych consult.  psych saw patient, feel he is not in need of inpatient psych admission at this time.  will fu outpatient with his psychiatrist and continue current medications

## 2019-03-02 NOTE — ED PROVIDER NOTE - CLINICAL SUMMARY MEDICAL DECISION MAKING FREE TEXT BOX
57yo presents with command auditory hallucinations and suicidal ideation. 57yo presents with command auditory hallucinations and suicidal ideation.  also heavy alcohol use, last drink last night.  ativan given and psych clearance labs sent.  psych will see patient and determine if requires inpatient treatment at this time

## 2019-03-02 NOTE — ED BEHAVIORAL HEALTH ASSESSMENT NOTE - DESCRIPTION (FIRST USE, LAST USE, QUANTITY, FREQUENCY, DURATION)
reports two pints of alcohol daily x two weeks last used two days ago crack cocaine use, last used two days ago

## 2019-03-02 NOTE — ED PROVIDER NOTE - NSFOLLOWUPINSTRUCTIONS_ED_ALL_ED_FT
follow up with your psychiatrist this week    Schizophrenia    WHAT YOU NEED TO KNOW:    Schizophrenia is a long-term mental disease that affects how your brain works. It is a disease that may change how you think, feel, and behave. You may not be able to know what is real and what is not real. Your thoughts may not be clear, or may jump from one topic to another.    DISCHARGE INSTRUCTIONS:    Medicines:     Antipsychotics: These help decrease psychotic symptoms and severe agitation. You may need antiparkinson medicine to control muscle stiffness, twitches, and restlessness caused by antipsychotic medicines.      Antianxiety medicine: This medicine may be given to decrease anxiety and help you feel calm and relaxed.       Antidepressants: These help with symptoms of depression and anxiety.      Mood stabilizers: These control mood swings.      Tranquilizers: These increase feelings of being calm and relaxed.      Take your medicine as directed. Contact your healthcare provider if you think your medicine is not helping or if you have side effects. Tell him of her if you are allergic to any medicine. Keep a list of the medicines, vitamins, and herbs you take. Include the amounts, and when and why you take them. Bring the list or the pill bottles to follow-up visits. Carry your medicine list with you in case of an emergency.    Follow up with your healthcare provider or psychiatrist as directed: Write down your questions so you remember to ask them during your visits.    Manage your symptoms:     Do not stop taking your medicines: Tell your healthcare provider or psychiatrist if you have any problems with or questions about your medicines.      Do not stop your therapies: It is normal to have doubts about or to feel discomfort with your therapy. Tell your healthcare provider or psychiatrist if you are not comfortable or have questions about your therapies.      Get regular sleep: Try to get 6 to 8 hours of sleep each night. Tell your healthcare provider or psychiatrist if you are not able to sleep, or if you are sleeping too much.      Do not drink alcohol: Alcohol interacts with medicine used to treat schizophrenia.    Contact your healthcare provider or psychiatrist if:     You feel that you are having symptoms of schizophrenia.      You are not able to sleep well, or are sleeping more than usual.      You cannot eat or are eating more than usual.      You have questions about your condition or care.    Return to the emergency department if:     You think about killing yourself or someone else.      You have a rash, swelling, or trouble breathing after you take your medicine.         © Copyright [employer] 2019 All illustrations and images included in CareNotes are the copyrighted property of [email or web address] or [employer].      back to top                      © Copyright [employer] 2019

## 2019-03-02 NOTE — ED ADULT NURSE NOTE - NSIMPLEMENTINTERV_GEN_ALL_ED
Implemented All Universal Safety Interventions:  [place] to call system. Call bell, personal items and telephone within reach. Instruct patient to call for assistance. Room bathroom lighting operational. Non-slip footwear when patient is off stretcher. Physically safe environment: no spills, clutter or unnecessary equipment. Stretcher in lowest position, wheels locked, appropriate side rails in place.

## 2019-03-02 NOTE — ED BEHAVIORAL HEALTH ASSESSMENT NOTE - NS ED BHA REVIEW OF ED CHART VITAL SIGNS REVIEWED
HPI:    Estrada Grande is a 67year old male here today for hospital follow up.    He was discharged from Inpatient hospital, [facility] to Home   Admission Date: 1/24/18   Discharge Date: 2/2/18  Hospital Discharge Diagnoses (since 1/9/2018) Prior to Visit:  ipratropium-albuterol 0.5-2.5 (3) MG/3ML Inhalation Solution Take 3 mL by nebulization 4 (four) times daily. spironolactone 25 MG Oral Tab Take 1 tablet (25 mg total) by mouth BID (Diuretic).    docusate sodium 100 MG Oral Cap Take 100 mg He  has a past surgical history that includes cholecystectomy (1980); hernia surgery (2006); arthroscopy of joint unlisted (Left, 1994); other surgical history (02/11/2005); other surgical history (1/30/2015); revise median n/carpal tunnel surg (Left, 4/ PERRLA, EOMI, conjunctiva are clear  NECK: supple, no adenopathy, no bruits  CHEST: no chest tenderness  LUNGS: Bibasilar posterior crackles   CARDIO: RRR without murmur  GI: good BS's, no masses, HSM or tenderness  MUSCULOSKELETAL: back is not tender, FRO Yes

## 2019-03-02 NOTE — ED BEHAVIORAL HEALTH ASSESSMENT NOTE - HPI (INCLUDE ILLNESS QUALITY, SEVERITY, DURATION, TIMING, CONTEXT, MODIFYING FACTORS, ASSOCIATED SIGNS AND SYMPTOMS)
Mr. Miranda is a 58 year old man, street undomiciled, single without children, unemployed  , with a medical history of hypothyroidism, hepatitis C, with a psychiatric history of history of schizophrenia paranoid type, alcohol and (crack) cocaine use disorders, history of 3 suicide attempts in past by overdose, history of 4 prior hospitalizations last at [facility] Dec 2018,  history of alcohol abuse with withdrawal tremors but no reported seizures or delirium tremens, no known history of arrests or legal issues, no history of violence or aggression, BIBS with complaints of command auditory hallucinations to drop in front of a train x week without intent in the setting of medication non compliance x 1 week, daily alcohol use, and recent crack cocaine and cannabis use.    The patient reported he ran out of medication one week ago and has not returned to the [facility] to refill because he has been drinking two pints of alcohol daily. He used crack cocaine and marijuana two days ago. For one week he has heard one male voice telling him to kill himself by jumping in front of a train or overdosing. He does not have medication. The voice is always there and nothing makes it better. He reported he wants to kill himself. Then he reported he brought himself to the hospital for medication, admission, and [facility] program follow up because he does not want to kill himself. He reported he was hospitalized at [facility] 3 months ago and 6 months ago for overdoses. He described his mood as depressed. He answered yes to most questions on psych ROS: +Vh of "shadows" everyday, paranoid ideation, thought broadcasting, anxiety. He denied homicidal ideation, delusions of reference, thought insertion. He denied having a , though the primary team spoke with a  during his most recent admission.    Per [facility], [phone], he presented November 2018, was intoxicated with CAH and SI, held overnight and discharged. Last admitted to [facility] in 2016. Has over 75 visits and two total psych hospitalizations.    Confidential Drug Utilization Report  Search Terms: Sabas miranda, 1960 Search Date: 03/02/2019 10:46:53 PM  The Drug Utilization Report below displays all of the controlled substance prescriptions, if any, that your patient has filled in the last twelve months. The information displayed on this report is compiled from pharmacy submissions to the Department, and accurately reflects the information as submitted by the pharmacies.  This report was requested by: Esha Avendano | Reference #: [id number]  There are no results for the search terms that you entered.

## 2019-03-02 NOTE — ED PROVIDER NOTE - CARE PLAN
Assessment and plan of treatment:	57yo M with history of schizophrenia, polysubstance abuse, hepatitis C and hyperthyroidism presents with 2 days of command auditory hallucinations and suicidal ideation, also with nausea and vomiting, possible alcohol withdrawal. Patient is calm and cooperative at this time. Will order Ativan for withdrawal symptoms. Pt likely requires psychiatric admission, will consult psychiatry. Principal Discharge DX:	Hallucinations  Assessment and plan of treatment:	57yo M with history of schizophrenia, polysubstance abuse, hepatitis C and hyperthyroidism presents with 2 days of command auditory hallucinations and suicidal ideation, also with nausea and vomiting, possible alcohol withdrawal. Patient is calm and cooperative at this time. Will order Ativan for withdrawal symptoms. Pt likely requires psychiatric admission, will consult psychiatry.

## 2019-03-02 NOTE — ED BEHAVIORAL HEALTH ASSESSMENT NOTE - CURRENT MEDICATION
Non compliant x 1 week  Quetiapine 300 mg po qhs  Trazodone 150 mg po qhs  Gabapentin 300 mg po qhs  Zolpidem 10 mg po qhs (ISTOP does not show this)

## 2019-03-02 NOTE — ED BEHAVIORAL HEALTH ASSESSMENT NOTE - OTHER PAST PSYCHIATRIC HISTORY (INCLUDE DETAILS REGARDING ONSET, COURSE OF ILLNESS, INPATIENT/OUTPATIENT TREATMENT)
Last hospitalized at [facility] 12/13/18-1/2/2019. Patient's presentation incongruent with observed behavior.  Reported hospitalized at [facility] 3 months ago and 6 months ago for overdose. [facility] reported he presented November 2018, was intoxicated with CAH and SI, held overnight and discharged. Admitted to [facility] in 2016.  Follows up at [facility]

## 2019-09-17 ENCOUNTER — EMERGENCY (EMERGENCY)
Facility: HOSPITAL | Age: 59
LOS: 1 days | Discharge: HOME | End: 2019-09-17
Attending: EMERGENCY MEDICINE | Admitting: EMERGENCY MEDICINE
Payer: MEDICARE

## 2019-09-17 VITALS
SYSTOLIC BLOOD PRESSURE: 142 MMHG | TEMPERATURE: 97 F | HEART RATE: 87 BPM | HEIGHT: 67 IN | WEIGHT: 199.96 LBS | RESPIRATION RATE: 16 BRPM | DIASTOLIC BLOOD PRESSURE: 88 MMHG | OXYGEN SATURATION: 97 %

## 2019-09-17 DIAGNOSIS — R69 ILLNESS, UNSPECIFIED: ICD-10-CM

## 2019-09-17 DIAGNOSIS — F10.10 ALCOHOL ABUSE, UNCOMPLICATED: ICD-10-CM

## 2019-09-17 DIAGNOSIS — F19.94 OTHER PSYCHOACTIVE SUBSTANCE USE, UNSPECIFIED WITH PSYCHOACTIVE SUBSTANCE-INDUCED MOOD DISORDER: ICD-10-CM

## 2019-09-17 DIAGNOSIS — F14.10 COCAINE ABUSE, UNCOMPLICATED: ICD-10-CM

## 2019-09-17 LAB
ALBUMIN SERPL ELPH-MCNC: 4.5 G/DL — SIGNIFICANT CHANGE UP (ref 3.3–5)
ALP SERPL-CCNC: 80 U/L — SIGNIFICANT CHANGE UP (ref 40–120)
ALT FLD-CCNC: 93 U/L — HIGH (ref 10–45)
ANION GAP SERPL CALC-SCNC: 16 MMOL/L — SIGNIFICANT CHANGE UP (ref 5–17)
APAP SERPL-MCNC: <5 UG/ML — LOW (ref 10–30)
AST SERPL-CCNC: 70 U/L — HIGH (ref 10–40)
BASOPHILS # BLD AUTO: 0.02 K/UL — SIGNIFICANT CHANGE UP (ref 0–0.2)
BASOPHILS NFR BLD AUTO: 0.2 % — SIGNIFICANT CHANGE UP (ref 0–2)
BILIRUB SERPL-MCNC: 0.5 MG/DL — SIGNIFICANT CHANGE UP (ref 0.2–1.2)
BUN SERPL-MCNC: 15 MG/DL — SIGNIFICANT CHANGE UP (ref 7–23)
CALCIUM SERPL-MCNC: 8.8 MG/DL — SIGNIFICANT CHANGE UP (ref 8.4–10.5)
CHLORIDE SERPL-SCNC: 104 MMOL/L — SIGNIFICANT CHANGE UP (ref 96–108)
CO2 SERPL-SCNC: 21 MMOL/L — LOW (ref 22–31)
CREAT SERPL-MCNC: 0.85 MG/DL — SIGNIFICANT CHANGE UP (ref 0.5–1.3)
EOSINOPHIL # BLD AUTO: 0.11 K/UL — SIGNIFICANT CHANGE UP (ref 0–0.5)
EOSINOPHIL NFR BLD AUTO: 1.1 % — SIGNIFICANT CHANGE UP (ref 0–6)
ETHANOL SERPL-MCNC: 34 MG/DL — HIGH (ref 0–10)
GLUCOSE SERPL-MCNC: 110 MG/DL — HIGH (ref 70–99)
HCT VFR BLD CALC: 41.8 % — SIGNIFICANT CHANGE UP (ref 39–50)
HGB BLD-MCNC: 13.6 G/DL — SIGNIFICANT CHANGE UP (ref 13–17)
IMM GRANULOCYTES NFR BLD AUTO: 0.2 % — SIGNIFICANT CHANGE UP (ref 0–1.5)
LYMPHOCYTES # BLD AUTO: 1.62 K/UL — SIGNIFICANT CHANGE UP (ref 1–3.3)
LYMPHOCYTES # BLD AUTO: 16.6 % — SIGNIFICANT CHANGE UP (ref 13–44)
MCHC RBC-ENTMCNC: 29.6 PG — SIGNIFICANT CHANGE UP (ref 27–34)
MCHC RBC-ENTMCNC: 32.5 GM/DL — SIGNIFICANT CHANGE UP (ref 32–36)
MCV RBC AUTO: 90.9 FL — SIGNIFICANT CHANGE UP (ref 80–100)
MONOCYTES # BLD AUTO: 0.71 K/UL — SIGNIFICANT CHANGE UP (ref 0–0.9)
MONOCYTES NFR BLD AUTO: 7.3 % — SIGNIFICANT CHANGE UP (ref 2–14)
NEUTROPHILS # BLD AUTO: 7.28 K/UL — SIGNIFICANT CHANGE UP (ref 1.8–7.4)
NEUTROPHILS NFR BLD AUTO: 74.6 % — SIGNIFICANT CHANGE UP (ref 43–77)
NRBC # BLD: 0 /100 WBCS — SIGNIFICANT CHANGE UP (ref 0–0)
PCP SPEC-MCNC: SIGNIFICANT CHANGE UP
PLATELET # BLD AUTO: 256 K/UL — SIGNIFICANT CHANGE UP (ref 150–400)
POTASSIUM SERPL-MCNC: 3.9 MMOL/L — SIGNIFICANT CHANGE UP (ref 3.5–5.3)
POTASSIUM SERPL-SCNC: 3.9 MMOL/L — SIGNIFICANT CHANGE UP (ref 3.5–5.3)
PROT SERPL-MCNC: 8.1 G/DL — SIGNIFICANT CHANGE UP (ref 6–8.3)
RBC # BLD: 4.6 M/UL — SIGNIFICANT CHANGE UP (ref 4.2–5.8)
RBC # FLD: 14.4 % — SIGNIFICANT CHANGE UP (ref 10.3–14.5)
SALICYLATES SERPL-MCNC: <0.3 MG/DL — LOW (ref 2.8–20)
SODIUM SERPL-SCNC: 141 MMOL/L — SIGNIFICANT CHANGE UP (ref 135–145)
WBC # BLD: 9.76 K/UL — SIGNIFICANT CHANGE UP (ref 3.8–10.5)
WBC # FLD AUTO: 9.76 K/UL — SIGNIFICANT CHANGE UP (ref 3.8–10.5)

## 2019-09-17 PROCEDURE — 93010 ELECTROCARDIOGRAM REPORT: CPT

## 2019-09-17 PROCEDURE — 80307 DRUG TEST PRSMV CHEM ANLYZR: CPT

## 2019-09-17 PROCEDURE — 99285 EMERGENCY DEPT VISIT HI MDM: CPT | Mod: 25

## 2019-09-17 PROCEDURE — 93005 ELECTROCARDIOGRAM TRACING: CPT

## 2019-09-17 PROCEDURE — 99284 EMERGENCY DEPT VISIT MOD MDM: CPT

## 2019-09-17 PROCEDURE — 36415 COLL VENOUS BLD VENIPUNCTURE: CPT

## 2019-09-17 PROCEDURE — 85025 COMPLETE CBC W/AUTO DIFF WBC: CPT

## 2019-09-17 PROCEDURE — 90792 PSYCH DIAG EVAL W/MED SRVCS: CPT

## 2019-09-17 PROCEDURE — 80053 COMPREHEN METABOLIC PANEL: CPT

## 2019-09-17 RX ORDER — QUETIAPINE FUMARATE 200 MG/1
1 TABLET, FILM COATED ORAL
Qty: 10 | Refills: 0
Start: 2019-09-17 | End: 2019-09-26

## 2019-09-17 NOTE — ED ADULT TRIAGE NOTE - CHIEF COMPLAINT QUOTE
hearing voices , feeling depressed and suicidal since I ran  out of Serequel, Trazadone since 1 week ago

## 2019-09-17 NOTE — ED BEHAVIORAL HEALTH ASSESSMENT NOTE - OTHER
[facility] poor motivation for outpatient treatment Frequently presents to hospitals, seeking help

## 2019-09-17 NOTE — ED BEHAVIORAL HEALTH ASSESSMENT NOTE - OTHER PAST PSYCHIATRIC HISTORY (INCLUDE DETAILS REGARDING ONSET, COURSE OF ILLNESS, INPATIENT/OUTPATIENT TREATMENT)
[facility] Patient's presentation incongruent with observed behavior.  Reported hospitalized at [facility] 3 months ago and 6 months ago for overdose. [facility] reported he presented November 2018, was intoxicated with CAH and SI, held overnight and discharged. Admitted to [facility] in 2016.  Follows up at [facility]

## 2019-09-17 NOTE — ED BEHAVIORAL HEALTH ASSESSMENT NOTE - SUMMARY
Mr. Dorantes is a 59 year old man, street undomiciled, single without children, unemployed  , with a medical history of hypothyroidism, hepatitis C, with a psychiatric history of self reported schizophrenia paranoid type (as per [facility] pt with likely primary substance use disorder), alcohol and (crack) cocaine use disorders, self reported history of 2 suicide attempts in past by overdose (as per [facility] this is unverified), [facility], frequent ED visits at [facility] (pt has not been admitted there since 2016), [facility], [facility], history of alcohol abuse with withdrawal tremors but no reported seizures or delirium tremens, hx/o incarceration as per [facility] records, who BIBS with complaints of command auditory hallucinations to jump in front of a train in the setting of medication/outpatient care non compliance, daily alcohol use, and recent crack cocaine use.   Pt is unreliable historian and provides conflicting information.  Patient's presentation is consistent with multiple previous presentations of CAH and SI in the setting of substance use and housing stressors. During previous hospitalization, patient's presentation was inconsistent with his report, raising concern for malingering. Multiple sources of collateral contradict patient's narrative and support concern for secondary gain of admission due to recent substance use and because of housing difficulties. As per collateral information pt often presents to multiple hospitals to gain admissions. Though patient has a chronic elevated risk of harm to self given reported past attempts (at this time, reported attempts not supported by collateral), hospitalizations, substance use, male gender, long standing psychiatric illness, the patient appears to be at his baseline. Patient's chronic risk would not be mitigated by an inpatient psychiatric and should continue treatment in the community.    Plan  -Discharge with list of shelter referrals  -Provide Seroquel 50 mg qday X 10 days  -Patient to follow up at [facility] walk in clinic  following discharge from ED.

## 2019-09-17 NOTE — ED BEHAVIORAL HEALTH ASSESSMENT NOTE - DESCRIPTION (FIRST USE, LAST USE, QUANTITY, FREQUENCY, DURATION)
reports two pints of alcohol daily x two weeks last used last night crack cocaine use, last used two days ago

## 2019-09-17 NOTE — ED PROVIDER NOTE - ATTENDING CONTRIBUTION TO CARE
59 year old male with PMH of htn, SI, AH, polysubstance abuse presenting ED with worsening AH and SI with thoughts of jumping in front of a train vs overdose on medication. Endorses crack cocaine  and vodka use last night; no history of alcohol withdrawal seizures. Patient appears well, non-toxic. RRR. CTA b/l, abd: soft and NT. labs ordered and noted. Patient evaluated by psych. Per psych, patient stable for dc home to follow up with psychiatry services outpt.  Dcd. 59 year old male with PMH of htn, SI, AH, polysubstance abuse presenting ED with worsening AH and SI with thoughts of jumping in front of a train vs overdose on medication. Endorses crack cocaine  and vodka use last night; no history of alcohol withdrawal seizures. Patient appears well, non-toxic. RRR. CTA b/l, abd: soft and NT. labs ordered and noted. Patient evaluated by psych. Per psych, patient stable for discharge and to follow up with psychiatry services outpt.

## 2019-09-17 NOTE — ED PROVIDER NOTE - PHYSICAL EXAMINATION
General: Patient is well developed and well nourished. Patient is alert and oriented to person, place and date. Patient is laying comfortably in stretcher and appears in no acute distress.  HEENT: Head is normocephalic and atraumatic. Pupils are equal, round and reactive. Extraocular movements intact. No evidence of nystagmus, conjunctival injection, or scleral icterus. External ears symmetric without evidence of discharge.  Nose is symmetric, non-tender, patent without evidence of discharge. Teeth in good repair. Uvula midline.   Neck: Supple with no evidence of lymphadenopathy.  Full range of motion.  Heart: Regular rate and rhythm. No murmurs, rubs or gallops.   Lungs: Clear to auscultation bilaterally with equal chest expansion. No note of wheezes, rhonchi, rales. Equal chest expansion. No note of retractions.  Abdomen: Bowel sounds present in all four quadrants. Soft, non-tender, non-distended without signs of masses, rebound or guarding. No note of hepatosplenomegaly. No CVA tenderness bilaterally. Negative Parmar sign. No pain present over McBurney's point.  Neuro: CGCS 15. Moving all extremities without discomfort. gait steady   Skin: Warm, dry and intact without evidence of rashes, bruising, pallor, jaundice or cyanosis.   Psych: Mood and affect appropriate.

## 2019-09-17 NOTE — ED BEHAVIORAL HEALTH ASSESSMENT NOTE - ADDITIONAL DETAILS ALL
Pt is unrelieable hisotrian. As per [facility] there is no evidence of pt's past SA's. Pt has hx/o endorsing SI and CAH to gain admission.

## 2019-09-17 NOTE — ED PROVIDER NOTE - PATIENT PORTAL LINK FT
You can access the FollowMyHealth Patient Portal offered by [facility] by registering at the following website: [email or web address]. By joining [facility]’s FollowMyHealth portal, you will also be able to view your health information using other applications (apps) compatible with our system.

## 2019-09-17 NOTE — ED BEHAVIORAL HEALTH ASSESSMENT NOTE - VIOLENCE RISK FACTORS:
Command hallucinations for violent behavior/Feeling of being under threat and being unable to control threat/Substance abuse

## 2019-09-17 NOTE — ED PROVIDER NOTE - NSFOLLOWUPINSTRUCTIONS_ED_ALL_ED_FT
SUICIDE PREVENTION - AfterCare(R) Instructions(ER/ED)     Suicide Prevention    WHAT YOU NEED TO KNOW:    You may see suicide as the only way to escape emotional or physical pain and suffering. Help is available from people who care about you, and from professionals trained in suicide prevention. Prevention includes everything you and others can do to stop you from taking your life.    DISCHARGE INSTRUCTIONS:    Call your local emergency number (911 in the ), or ask someone to call if:     You do something on purpose to hurt yourself.      You make a plan to commit suicide.    Call your doctor or therapist, or have someone close to you call if:     You act out in anger, are reckless, or are abusing alcohol or drugs.      You have serious thoughts of suicide, even with treatment.      You have more thoughts of suicide when you are alone.      You stop eating, or you begin to smoke cigarettes or drink alcohol.      You have questions or concerns about your condition or care.    What to do if you are considering suicide:     Contact a suicide prevention organization. The following are always available to help you:   National Suicide Prevention Lifeline: 1-466.853.7635 (6-283-671-TALK)      Suicide Hotline: 1-129.970.3126 (6-636-FNPOBAL)      For a list of international numbers: https://save.org/find-help/international-resources/      Contact your therapist. Your doctor can give you a list of therapists if you do not have one.      Keep medicines, weapons, and alcohol out of your home.      Do not spend time alone if you have thoughts of ending your life.    Warning signs of suicide: The following can help you and others recognize that you are struggling:     Talking about your plan for committing suicide, or wanting to read or write about death or suicide      Cutting yourself, burning your skin with cigarettes, or driving recklessly      Drug or alcohol use, not taking your prescribed medicine, or taking too much      Not wanting to spend time with others or doing things you enjoy, feeling bored, or not wanting anyone to praise you      Changes in your appetite, sleep habits, energy levels, or weight      Feeling angry, or lashing out at others      A need to give away or throw away your belongings      Often skipping work      Suddenly not taking medicine for a mental illness without talking to your healthcare provider      Suddenly not going to therapy    Treatment for suicidal thoughts:     Medicines may be given to prevent mood swings, or to decrease anxiety or depression. You will need to take all medicines as directed. A sudden stop can be harmful. It may take 4 to 6 weeks for the medicine to help you feel better.      Suicide risk assessment means healthcare providers will ask questions about your suicide thoughts and plans. They will ask how often you think about suicide and if you have tried it before. They will ask if you have begun to hurt yourself, such as with cutting or reckless driving. They may ask if you have access to weapons or drugs.      A safety plan includes a list of people or groups to contact if you have suicidal feelings again. The list may include friends, family members, a spiritual leader, and others you trust. You may be asked to make a verbal agreement or sign a contract that you will not try to harm yourself.      A therapist can help you identify and change negative feelings or beliefs about yourself. This may help change the way you feel and act. A therapist can also help you find ways to cope with things that cannot be changed.    Manage depression:     Get help for drug or alcohol abuse. Drugs and alcohol can make suicidal feelings worse and make you more likely to act on them. Drugs and alcohol can also cause or increase depression.      Talk to someone you trust. Be honest about your thoughts and feelings about suicide. You can call a suicide prevention center if you do not want to talk to someone you know.      Exercise as directed. Exercise can lift your mood, give you more energy, and make it easier to sleep.       Eat a variety of healthy foods. Healthy foods include fruits, vegetables, whole-grain breads, lean meats, fish, low-fat dairy products, and beans. Try to eat regularly even if you do not feel hungry. Depression can increase from a lack of nutrition or if you are hungry for long periods of time.      Create a sleep routine. Try to go to bed and wake up at the same time every day. Let your healthcare provider know if you are having trouble sleeping.      Take your medicine and go to therapy as directed. Medicine and therapy can help you manage your mental health. Do not stop taking your medicine without talking to your healthcare provider. If you do not like the way a medicine makes you feel, you may be able to try a different medicine.    For support and more information:     National Suicide Prevention Lifeline  [facility],[place]  Phone: 7-311-567-TERB (6417)  Web Address: http://www.suicidepreventionlifeline.org      Suicide Awareness Voices of Education  [address]  [place], [place]  Phone: 1-441.486.8948  Web Address: http://www.[employer].org      Follow up with your doctor or therapist as directed: Write down your questions so you remember to ask them during your visits.       © Copyright [employer] 2019 All illustrations and images included in CareNotes are the copyrighted property of [employer].D.A[employer]M., Inc[employer] or [employer].      back to top                      © Copyright [employer] 2019

## 2019-09-17 NOTE — ED PROVIDER NOTE - CHIEF COMPLAINT
The patient is a 59y Male pmhx htn, SI attempt (1 month ago), polysubstance abuse, complaining of auditory hallucinations and SI.

## 2019-09-17 NOTE — ED BEHAVIORAL HEALTH ASSESSMENT NOTE - DETAILS
[facility] conveyed to ED attending/PA spoke with ED staff

## 2019-09-17 NOTE — ED BEHAVIORAL HEALTH ASSESSMENT NOTE - HPI (INCLUDE ILLNESS QUALITY, SEVERITY, DURATION, TIMING, CONTEXT, MODIFYING FACTORS, ASSOCIATED SIGNS AND SYMPTOMS)
Mr. Dorantes is a 59 year old man, street undomiciled, single without children, unemployed  , with a medical history of hypothyroidism, hepatitis C, with a psychiatric history of self reported schizophrenia paranoid type (as per [facility] pt with likely primary substance use disorder), alcohol and (crack) cocaine use disorders, self reported history of 2 suicide attempts in past by overdose (as per [facility] this is unverified), [facility], frequent ED visits at [facility] (pt has not been admitted there since 2016), [facility], [facility], history of alcohol abuse with withdrawal tremors but no reported seizures or delirium tremens, hx/o incarceration as per [facility] records, who BIBS with complaints of command auditory hallucinations to jump in front of a train in the setting of medication/outpatient care non compliance, daily alcohol use, and recent crack cocaine use. Pt is unreliable historian and provides conflicting information. Collateral information was obtained from [facility]. As per ED psychiatrist at [facility] pt has hx/o multiple visits to the ED where he presents intoxicated with similar complaints of SI and CAH, which are thought to be secondary to substance use. Pt has documented hx/o malingering, non compliance with outpatient treatment. Pt has been referred multiple times to outpatient and inpatient substance abuse programs. Pt goes to multiple ED's in [place] requesting admission likely to gain housing. As per [facility] pt has over 75 visits and two total psych hospitalizations. He has not been willing to engage in any meaningful outpatient follow up and has not shown motivation for substance abuse treatment.   Per on-call psychiatrist at [facility], pt has had multiple admissions to their Dual Diagnosis Clinic most recently discharged on August 27th 2019 with additional admissions on Aug 7th and in June and July 2019. Their records indicate pt has diagnoses of Borderline Intellectual Functioning as well as ongoing etoh use d/o and cocaine use d/o. Additionally, pt has issues with his housing and often c/o of suicidality and hearing voices. Pt does not have an outpatient psychiatrist but received hydroxyzine, Seroquel, sertraline, and trazodone on his last discharge from the Dual D/o unit.     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 She was not aware of any diagnosis of schizophrenia but was unsure and not involved in his care. Pt does not have a phone and his mom did not know of any providers or friends that he sees regularly. Stated that pt will occasionally call her to say he is “nervous” and “depressed.”                 •	Residence: Undomiciled, lives on the streets   •	Childhood: Grew up in [place], per mother pt was a “withdrawn” but intelligent child who got “straight As”  •	Abuse history: Unknown       Currently pt states that he ran out of medication one week ago, requesting Seroquel. He used crack cocaine last night. Reports daily ETOh use, unclear of the amount. Pt endorses SI and CAH. He however does not appear internally preoccupied and is noted to be inconsistent with his history.

## 2019-09-17 NOTE — ED ADULT NURSE REASSESSMENT NOTE - NS ED NURSE REASSESS COMMENT FT1
pt endorsed to me from [facility]ER[facility], c/o auditory hallucinations, pt reports " voices are telling him to kill himself, but has not a plan at the moment. stated he attempted suicide about a month ago, taking lots of pills" pt calm during assessment, blood work drawn and urine collected , sent to lab. awaiting results and pending psych eval.

## 2019-09-17 NOTE — ED PROVIDER NOTE - OBJECTIVE STATEMENT
states that over the past week he has been having worsening auditory hallucinations; voices telling him to kill himself by jumping in front of a train or over dosing on medication. states that he has OD on medication as suicidal attempt in the past. states that he ran out of his medications (trazodone and seroquel yesterday). states that his last drink or alcohol (2 pints of vodka) was last night, no history of withdrawal seizures, rather "the shakes and nausea". states that he used crack last night as well. does not endorse associated symptoms.

## 2019-09-17 NOTE — ED BEHAVIORAL HEALTH ASSESSMENT NOTE - DIFFERENTIAL
alcohol, cannabis, and (crack) cocaine use disorders  substance induced mood disorder  psychosis secondary to substance use vs schizophrenia  Malingering

## 2019-09-17 NOTE — ED BEHAVIORAL HEALTH ASSESSMENT NOTE - ACTIVATING EVENTS/STRESSORS
Legal problems/Recent inpatient discharge/Substance intoxication or withdrawal/Non-compliant or not receiving treatment

## 2019-09-17 NOTE — ED BEHAVIORAL HEALTH ASSESSMENT NOTE - PREPARATORY ACTS:
Spoke with pt, states he continues with left heel pain, no improvement. Pt states just sitting for 30 mins can be very painful. Pt asking if he comes in to tomorrow's appt, will MD prescribe medication for the pain? Pt informed would send message to provider to advise if to keep appt or schedule straight with Podiatrist.   Yes > 3 months ago

## 2019-09-17 NOTE — ED ADULT NURSE NOTE - OBJECTIVE STATEMENT
Patient to the ED with complaints of hearing voices, depression and SI, ran out of medication 1 week ago, denies plan.  Placed on CO and wanded for safety.

## 2019-09-17 NOTE — ED PROVIDER NOTE - CLINICAL SUMMARY MEDICAL DECISION MAKING FREE TEXT BOX
59 year old male pmhx htn, SI, AH presenting tot  ed with worsening AH and SI via jumping in front of a train vs overdose on medication. history of multiple suicidal attempts in the past. has not been on his medication over the past week.  endorses crack and vodka use last night; no history of alcohol withdrawal seizures. PE patient appears well, non-toxic. labs ordered, patient to be evaluated by psych. 59 year old male pmhx htn, SI, AH presenting tot  ed with worsening AH and SI via jumping in front of a train vs overdose on medication. history of multiple suicidal attempts in the past. has not been on his medication over the past week.  endorses crack and vodka use last night; no history of alcohol withdrawal seizures. PE patient appears well, non-toxic. labs ordered, patient to be evaluated by psych. per psych, patient stable for dc home to [facility] evaluation and management discussed with the patient and family (if available) in detail.  Close PMD follow up encouraged.  Strict ED return instructions discussed in detail and patient given the opportunity to ask any questions about their discharge diagnosis and instructions. Patient verbalized understanding. Patient is agreeable to plan.

## 2019-09-21 DIAGNOSIS — R44.0 AUDITORY HALLUCINATIONS: ICD-10-CM

## 2020-06-15 NOTE — PROGRESS NOTE BEHAVIORAL HEALTH - PROBLEM SELECTOR PLAN 3
Pt comes in with c/o midsternal chest pain, shortness of breath, and a dry cough for 3 days. Denies fevers, N/V/D. Hx of anxiety.  
counselling and encourage rehab

## 2020-11-05 ENCOUNTER — EMERGENCY (EMERGENCY)
Facility: HOSPITAL | Age: 60
LOS: 1 days | Discharge: HOME | End: 2020-11-05
Attending: EMERGENCY MEDICINE | Admitting: EMERGENCY MEDICINE
Payer: MEDICARE

## 2020-11-05 VITALS
WEIGHT: 199.96 LBS | TEMPERATURE: 98 F | SYSTOLIC BLOOD PRESSURE: 92 MMHG | HEART RATE: 100 BPM | HEIGHT: 67 IN | DIASTOLIC BLOOD PRESSURE: 57 MMHG | OXYGEN SATURATION: 94 % | RESPIRATION RATE: 18 BRPM

## 2020-11-05 DIAGNOSIS — F10.129 ALCOHOL ABUSE WITH INTOXICATION, UNSPECIFIED: ICD-10-CM

## 2020-11-05 DIAGNOSIS — Z23 ENCOUNTER FOR IMMUNIZATION: ICD-10-CM

## 2020-11-05 DIAGNOSIS — R41.82 ALTERED MENTAL STATUS, UNSPECIFIED: ICD-10-CM

## 2020-11-05 PROCEDURE — 70486 CT MAXILLOFACIAL W/O DYE: CPT | Mod: 26

## 2020-11-05 PROCEDURE — 99220: CPT

## 2020-11-05 PROCEDURE — 70450 CT HEAD/BRAIN W/O DYE: CPT | Mod: 26

## 2020-11-05 RX ORDER — MIDAZOLAM HYDROCHLORIDE 1 MG/ML
5 INJECTION, SOLUTION INTRAMUSCULAR; INTRAVENOUS ONCE
Refills: 0 | Status: DISCONTINUED | OUTPATIENT
Start: 2020-11-05 | End: 2020-11-05

## 2020-11-05 RX ORDER — TETANUS TOXOID, REDUCED DIPHTHERIA TOXOID AND ACELLULAR PERTUSSIS VACCINE, ADSORBED 5; 2.5; 8; 8; 2.5 [IU]/.5ML; [IU]/.5ML; UG/.5ML; UG/.5ML; UG/.5ML
0.5 SUSPENSION INTRAMUSCULAR ONCE
Refills: 0 | Status: COMPLETED | OUTPATIENT
Start: 2020-11-05 | End: 2020-11-05

## 2020-11-05 RX ADMIN — MIDAZOLAM HYDROCHLORIDE 5 MILLIGRAM(S): 1 INJECTION, SOLUTION INTRAMUSCULAR; INTRAVENOUS at 22:20

## 2020-11-05 RX ADMIN — TETANUS TOXOID, REDUCED DIPHTHERIA TOXOID AND ACELLULAR PERTUSSIS VACCINE, ADSORBED 0.5 MILLILITER(S): 5; 2.5; 8; 8; 2.5 SUSPENSION INTRAMUSCULAR at 23:51

## 2020-11-05 NOTE — ED PROVIDER NOTE - CLINICAL SUMMARY MEDICAL DECISION MAKING FREE TEXT BOX
pt intoxicated and with signs of head/face trauma, requiring sedation for agitation, cardiac monitoring. Will obs for neuro checks, cardiac monitoring after sedation, to follow on clinical sobriety.

## 2020-11-05 NOTE — ED PROVIDER NOTE - OBJECTIVE STATEMENT
60 male pt, unclear med hx, BIBEMS for public intoxication. +signs of head/face trauma. Pt repetitively asking for a sandwich but does not remember falling or getting any face/head trauma. no chest pain, no sob, no abd pain. Getting out of bed, risk to self.

## 2020-11-05 NOTE — ED ADULT TRIAGE NOTE - CHIEF COMPLAINT QUOTE
Bedside shift change report given to Radha  (oncoming nurse) by Cheryl Santana  (offgoing nurse). Report included the following information SBAR, ED Summary and MAR. Pt BIBA for AMS. Pt admits to drinking 2 pints of [place], denies drug use. Pt fell, laceration above L eye and above lip. LOC unknown. Last tetanus unknown.

## 2020-11-05 NOTE — ED ADULT NURSE NOTE - OBJECTIVE STATEMENT
60y male presents to ED c/o EMS. Pt sleeping in stretcher, rousable to voice and light tactile stimulation. Pt 60y male presents to ED c/o EMS. Pt sleeping in stretcher, rousable to voice and light tactile stimulation. Pt admits to drinking 2l of 60y male presents to ED c/o EMS. Pt sleeping in stretcher, rousable to voice and light tactile stimulation. Pt admits to drinking 2l of [place] and using crack. Pt has laceration to upper right forehead and does not recall falling. Slurred speech noted. Pt has PMH of hepatitis C.

## 2020-11-05 NOTE — ED ADULT NURSE NOTE - CHIEF COMPLAINT QUOTE
Pt BIBA for AMS. Pt admits to drinking 2 pints of [place], denies drug use. Pt fell, laceration above L eye and above lip. LOC unknown. Last tetanus unknown.

## 2020-11-06 VITALS
TEMPERATURE: 98 F | RESPIRATION RATE: 16 BRPM | OXYGEN SATURATION: 94 % | DIASTOLIC BLOOD PRESSURE: 57 MMHG | SYSTOLIC BLOOD PRESSURE: 114 MMHG | HEART RATE: 94 BPM

## 2020-11-06 PROCEDURE — 99217: CPT

## 2020-11-06 RX ORDER — ONDANSETRON 8 MG/1
4 TABLET, FILM COATED ORAL ONCE
Refills: 0 | Status: DISCONTINUED | OUTPATIENT
Start: 2020-11-06 | End: 2020-11-06

## 2020-11-06 RX ORDER — ONDANSETRON 8 MG/1
4 TABLET, FILM COATED ORAL ONCE
Refills: 0 | Status: COMPLETED | OUTPATIENT
Start: 2020-11-06 | End: 2020-11-06

## 2020-11-06 RX ADMIN — ONDANSETRON 4 MILLIGRAM(S): 8 TABLET, FILM COATED ORAL at 01:11

## 2020-11-06 NOTE — ED CDU PROVIDER DISPOSITION NOTE - CLINICAL COURSE
CT scans unremarkable.  Patient had some emesis in the ED, but nausea was improved with IM Zofran.  He remained hemodynamically stable in the ED.  Upon re-evaluation, his vitals were stable, he was awake and alert, and he was comfortable being discharged.  He stated he will likely go to one of the local shelters, as he is homeless at the moment.  He was given head injury precautions & was discharged in stable condition.

## 2020-11-06 NOTE — ED CDU PROVIDER DISPOSITION NOTE - PATIENT PORTAL LINK FT
You can access the FollowMyHealth Patient Portal offered by [facility] by registering at the following website: [email or web address]. By joining [facility]’s FollowMyHealth portal, you will also be able to view your health information using other applications (apps) compatible with our system.

## 2020-11-06 NOTE — ED CDU PROVIDER DISPOSITION NOTE - NSFOLLOWUPINSTRUCTIONS_ED_ALL_ED_FT
Drink plenty of fluids.  Avoid alcohol!  Antibiotic ointment to abrasions.  Return if severe headache, persistent vomiting, etc.

## 2020-11-06 NOTE — ED ADULT NURSE REASSESSMENT NOTE - NS ED NURSE REASSESS COMMENT FT1
Sleeping comfortably in stretcher, easily rousable. Awaiting sobriety.
Pt had episodes of vomiting, medicated as ordered. Pt sleeping in stretcher on continuous pulse oximetry. Safety and comfort measures maintained. Pending sobriety for discharge.

## 2021-03-02 NOTE — PROGRESS NOTE BEHAVIORAL HEALTH - SUMMARY
no
59 YO M c multiple ED visits for alcohol intoxication with hx of schizophrenia, etoh use d/o and cocaine use d/o came in by self reporting +SI  c CAH to jump in front of train or OD on pills. He does not look internally preoccupied and certainly seems in search of a place to stay but he does seem very poorly related with flat affect, prominent negative symptoms, and impoverished thought process. Says he gets seroquel 400 mg q12h in [facility] He says he is a  and went to [facility] but no beds. He reports many "overdoses" on seroquel but usually only 4-5 pills. Most recently at [facility] but says he has had actual admissions at [place] and [facility] and also stayed at [facility] for 1 month. Says he was diagnosed as a young man with schizophrenia at [facility]. Has no supports and denies having any collaterals he can provide. Says he drinks 2 pints of vodka a day and last drank last night. He also reports recent cocaine (although UTOX was negative and BAL nil). He is interested in substance abuse rehab. Has scars on head whose origin he did not know. possible hx of TBI . Past head CTS reviewed and have shown scalp contusions and some parenchymal volume loss.    ;;12/13:  patient has AH and SI but cognition is intact.  States that he has hypothyroidism but not on meds; wants Seroquel.  Discussed with medicine.  Will order TSH and FT4.   Start on Seroquel at night for psychosis and mood.      ;;12/17: wants more Seroquel for AH and SI;  however seems somewhat vague about SI and AH.  Raising Seroquel dose  ;;12/18 states he hears voices; raising Seroquel to 400mg at night with anticipation of a response at near 600mg.  If unsuccessful may need to switch.  However affect is inappropriate and possibility of secondary gains must also be considered.
57 YO M c multiple ED visits for alcohol intoxication with hx of schizophrenia, etoh use d/o and cocaine use d/o came in by self reporting +SI  c CAH to jump in front of train or OD on pills. He does not look internally preoccupied and certainly seems in search of a place to stay but he does seem very poorly related with flat affect, prominent negative symptoms, and impoverished thought process. Says he gets seroquel 400 mg q12h in [facility] He says he is a  and went to [facility] but no beds. He reports many "overdoses" on seroquel but usually only 4-5 pills. Most recently at [facility] but says he has had actual admissions at [place] and [facility] and also stayed at [facility] for 1 month. Says he was diagnosed as a young man with schizophrenia at [facility]. Has no supports and denies having any collaterals he can provide. Says he drinks 2 pints of vodka a day and last drank last night. He also reports recent cocaine (although UTOX was negative and BAL nil). He is interested in substance abuse rehab. Has scars on head whose origin he did not know. possible hx of TBI . Past head CTS reviewed and have shown scalp contusions and some parenchymal volume loss.    ;;12/13:  patient has AH and SI but cognition is intact.  States that he has hypothyroidism but not on meds; wants Seroquel.  Discussed with medicine.  Will order TSH and FT4.   Start on Seroquel at night for psychosis and mood.      ;;12/14: wants more Seroquel for AH and SI;  however seems somewhat vague about SI and AH.
59 YO M c multiple ED visits for alcohol intoxication with hx of schizophrenia, etoh use d/o and cocaine use d/o came in by self reporting +SI  c CAH to jump in front of train or OD on pills. He does not look internally preoccupied and certainly seems in search of a place to stay but he does seem very poorly related with flat affect, prominent negative symptoms, and impoverished thought process. Says he gets seroquel 400 mg q12h in [facility] He says he is a  and went to [facility] but no beds. He reports many "overdoses" on seroquel but usually only 4-5 pills. Most recently at [facility] but says he has had actual admissions at [place] and [facility] and also stayed at [facility] for 1 month. Says he was diagnosed as a young man with schizophrenia at [facility]. Has no supports and denies having any collaterals he can provide. Says he drinks 2 pints of vodka a day and last drank last night. He also reports recent cocaine (although UTOX was negative and BAL nil). He is interested in substance abuse rehab. Has scars on head whose origin he did not know. possible hx of TBI . Past head CTS reviewed and have shown scalp contusions and some parenchymal volume loss.    ;;12/13:  patient has AH and SI but cognition is intact.  States that he has hypothyroidism but not on meds; wants Seroquel.  Discussed with medicine.  Will order TSH and FT4.   Start on Seroquel at night for psychosis and mood.      ;;12/17: wants more Seroquel for AH and SI;  however seems somewhat vague about SI and AH.  Raising Seroquel dose  ;;12/18 states he hears voices; raising Seroquel to 400mg at night with anticipation of a response at near 600mg.  If unsuccessful may need to switch.  However affect is inappropriate and possibility of secondary gains must also be considered.  ;;12/19: continues to endorse AH despite inapprop affect ; raising Seroquel to 500mg at night.  ;;12/20: continues to endorse AH ; raising Seroquel to 600mg at night; in view of persistently elevated bp will consult medicine re need for an antihypertensive medication.  ;;12/21: little change today; if little progress consider switch to a different SGA.  12/24: endorses AH, intermittently command to hurt self.  will inc seroquel to 700mg qhs.  poor sleep, requests increase in trazodone.  12/26: endorses AH, intermittently command to hurt self.  will inc seroquel to 800mg qhs.  12/27: reports improving AH and no longer endorsing si  12/28: denies ah, feels better, and hopes for dc next week.  requests increase in trazodone for insomnia, will increase to 150mg qhs.
59 YO M c multiple ED visits for alcohol intoxication with hx of schizophrenia, etoh use d/o and cocaine use d/o came in by self reporting +SI  c CAH to jump in front of train or OD on pills. He does not look internally preoccupied and certainly seems in search of a place to stay but he does seem very poorly related with flat affect, prominent negative symptoms, and impoverished thought process. Says he gets seroquel 400 mg q12h in [facility] He says he is a  and went to [facility] but no beds. He reports many "overdoses" on seroquel but usually only 4-5 pills. Most recently at [facility] but says he has had actual admissions at [place] and [facility] and also stayed at [facility] for 1 month. Says he was diagnosed as a young man with schizophrenia at [facility]. Has no supports and denies having any collaterals he can provide. Says he drinks 2 pints of vodka a day and last drank last night. He also reports recent cocaine (although UTOX was negative and BAL nil). He is interested in substance abuse rehab. Has scars on head whose origin he did not know. possible hx of TBI . Past head CTS reviewed and have shown scalp contusions and some parenchymal volume loss.    ;;12/13:  patient has AH and SI but cognition is intact.  States that he has hypothyroidism but not on meds; wants Seroquel.  Discussed with medicine.  Will order TSH and FT4.   Start on Seroquel at night for psychosis and mood.      ;;12/17: wants more Seroquel for AH and SI;  however seems somewhat vague about SI and AH.  Raising Seroquel dose  ;;12/18 states he hears voices; raising Seroquel to 400mg at night with anticipation of a response at near 600mg.  If unsuccessful may need to switch.  However affect is inappropriate and possibility of secondary gains must also be considered.  ;;12/19: continues to endorse AH despite inapprop affect ; raising Seroquel to 500mg at night.  ;;12/20: continues to endorse AH ; raising Seroquel to 600mg at night; in view of persistently elevated bp will consult medicine re need for an antihypertensive medication.  ;;12/21: little change today; if little progress consider switch to a different SGA.  12/24: endorses AH, intermittently command to hurt self.  will inc seroquel to 700mg qhs.  poor sleep, requests increase in trazodone.  12/26: endorses AH, intermittently command to hurt self.  will inc seroquel to 800mg qhs.  12/27: reports improving AH and no longer endorsing si
Summary (include case differential, formulation and patient response to therapy): 59 YO M c multiple ED visits for alcohol intoxication with hx of schizophrenia, etoh use d/o and cocaine use d/o came in by self reporting +SI  c CAH to jump in front of train or OD on pills. He does not look internally preoccupied and certainly seems in search of a place to stay but he does seem very poorly related with flat affect, prominent negative symptoms, and impoverished thought process. Says he gets seroquel 400 mg q12h in [facility] He says he is a  and went to [facility] but no beds. He reports many "overdoses" on seroquel but usually only 4-5 pills. Most recently at [facility] but says he has had actual admissions at [place] and [facility] and also stayed at [facility] for 1 month. Says he was diagnosed as a young man with schizophrenia at [facility]. Has no supports and denies having any collaterals he can provide. Says he drinks 2 pints of vodka a day and last drank last night. He also reports recent cocaine (although UTOX was negative and BAL nil). He is interested in substance abuse rehab. Has scars on head whose origin he did not know. possible hx of TBI . Past head CTS reviewed and have shown scalp contusions and some parenchymal volume loss.    ;;12/13:  patient has AH and SI but cognition is intact.  States that he has hypothyroidism but not on meds; wants Seroquel.  Discussed with medicine.  Will order TSH and FT4.   Start on Seroquel at night for psychosis and mood.      ;;12/17: wants more Seroquel for AH and SI;  however seems somewhat vague about SI and AH.  Raising Seroquel dose  ;;12/18 states he hears voices; raising Seroquel to 400mg at night with anticipation of a response at near 600mg.  If unsuccessful may need to switch.  However affect is inappropriate and possibility of secondary gains must also be considered.  ;;12/19: continues to endorse AH despite inapprop affect ; raising Seroquel to 500mg at night.  ;;12/20: continues to endorse AH ; raising Seroquel to 600mg at night; in view of persistently elevated bp will consult medicine re need for an antihypertensive medication.  ;;12/21: little change today; if little progress consider switch to a different SGA.  12/24: endorses AH, intermittently command to hurt self.  will inc seroquel to 700mg qhs.  poor sleep, requests increase in trazodone.  12/26: endorses AH, intermittently command to hurt self.  will inc seroquel to 800mg qhs.  ;;12/31: endorses AH but no SI; focused on leaving in 2 days; mood and eye contact improved.  ;;1/2: voices are soft; future oriented; wants to leaving tomorrow.   ;;1/3: patient aware of elevated a1c and need to maintain diet control;  QTc  472ms repeated 456ms ;  dropping Seroquel dose to 300mg po bid as a precaution.  no SI; less preoccupied ; future oriented; counselled to not use cocaine.  Good mood at time of discharge.
59 YO M c multiple ED visits for alcohol intoxication with hx of schizophrenia, etoh use d/o and cocaine use d/o came in by self reporting +SI  c CAH to jump in front of train or OD on pills. He does not look internally preoccupied and certainly seems in search of a place to stay but he does seem very poorly related with flat affect, prominent negative symptoms, and impoverished thought process. Says he gets seroquel 400 mg q12h in [facility] He says he is a  and went to [facility] but no beds. He reports many "overdoses" on seroquel but usually only 4-5 pills. Most recently at [facility] but says he has had actual admissions at [place] and [facility] and also stayed at [facility] for 1 month. Says he was diagnosed as a young man with schizophrenia at [facility]. Has no supports and denies having any collaterals he can provide. Says he drinks 2 pints of vodka a day and last drank last night. He also reports recent cocaine (although UTOX was negative and BAL nil). He is interested in substance abuse rehab. Has scars on head whose origin he did not know. possible hx of TBI . Past head CTS reviewed and have shown scalp contusions and some parenchymal volume loss.    ;;12/13:  patient has AH and SI but cognition is intact.  States that he has hypothyroidism but not on meds; wants Seroquel.  Discussed with medicine.  Will order TSH and FT4.   Start on Seroquel at night for psychosis and mood.      ;;12/17: wants more Seroquel for AH and SI;  however seems somewhat vague about SI and AH.  Raising Seroquel dose  ;;12/18 states he hears voices; raising Seroquel to 400mg at night with anticipation of a response at near 600mg.  If unsuccessful may need to switch.  However affect is inappropriate and possibility of secondary gains must also be considered.  ;;12/19: continues to endorse AH despite inapprop affect ; raising Seroquel to 500mg at night.  ;;12/20: continues to endorse AH ; raising Seroquel to 600mg at night; in view of persistently elevated bp will consult medicine re need for an antihypertensive medication.  ;;12/21: little change today; if little progress consider switch to a different SGA.  12/24: endorses AH, intermittently command to hurt self.  will inc seroquel to 700mg qhs.  poor sleep, requests increase in trazodone.  12/26: endorses AH, intermittently command to hurt self.  will inc seroquel to 800mg qhs.  ;;12/31: endorses AH but no SI; focused on leaving in 2 days; mood and eye contact improved.  ;;1/2: voices are soft; future oriented; wants to leaving tomorrow.
57 YO M c multiple ED visits for alcohol intoxication with hx of schizophrenia, etoh use d/o and cocaine use d/o came in by self reporting +SI  c CAH to jump in front of train or OD on pills. He does not look internally preoccupied and certainly seems in search of a place to stay but he does seem very poorly related with flat affect, prominent negative symptoms, and impoverished thought process. Says he gets seroquel 400 mg q12h in [facility] He says he is a  and went to [facility] but no beds. He reports many "overdoses" on seroquel but usually only 4-5 pills. Most recently at [facility] but says he has had actual admissions at [place] and [facility] and also stayed at [facility] for 1 month. Says he was diagnosed as a young man with schizophrenia at [facility]. Has no supports and denies having any collaterals he can provide. Says he drinks 2 pints of vodka a day and last drank last night. He also reports recent cocaine (although UTOX was negative and BAL nil). He is interested in substance abuse rehab. Has scars on head whose origin he did not know. possible hx of TBI . Past head CTS reviewed and have shown scalp contusions and some parenchymal volume loss.    ;;12/13:  patient has AH and SI but cognition is intact.  States that he has hypothyroidism but not on meds; wants Seroquel.  Discussed with medicine.  Will order TSH and FT4.   Start on Seroquel at night for psychosis and mood.      ;;12/17: wants more Seroquel for AH and SI;  however seems somewhat vague about SI and AH.  Raising Seroquel dose
57 YO M c multiple ED visits for alcohol intoxication with hx of schizophrenia, etoh use d/o and cocaine use d/o came in by self reporting +SI  c CAH to jump in front of train or OD on pills. He does not look internally preoccupied and certainly seems in search of a place to stay but he does seem very poorly related with flat affect, prominent negative symptoms, and impoverished thought process. Says he gets seroquel 400 mg q12h in addition [facility]od[facility] He says he is a  and went to [facility] but no beds. He reports many "overdoses" on seroquel but usually only 4-5 pills. Most recently at [facility] but says he has had actual admissions at [place] and [facility] and also stayed at [facility] for 1 month. Says he was diagnosed as a young man with schizophrenia at [facility]. Has no supports and denies having any collaterals he can provide. Says he drinks 2 pints of vodka a day and last drank last night. He also reports recent cocaine (although UTOX was negative and BAL nil). He is interested in substance abuse rehab. Has scars on head whose origin he did not know. possible hx of TBI . Past head CTS reviewed and have shown scalp contusions and some parenchymal volume loss.    ;;12/13:  patient has AH and SI but cognition is intact.  States that he has hypothyroidism but not on meds; wants Seroquel.  Discussed with medicine.  Will order TSH and FT4.   Start on Seroquel at night for psychosis and mood.      ;;12/17: wants more Seroquel for AH and SI;  however seems somewhat vague about SI and AH.  Raising Seroquel dose  ;;12/18 states he hears voices; raising Seroquel to 400mg at night with anticipation of a response at near 600mg.  If unsuccessful may need to switch.  However affect is inappropriate and possibility of secondary gains must also be considered.  ;;12/19: continues to endorse AH despite inapprop affect ; raising Seroquel to 500mg at night.
57 YO M c multiple ED visits for alcohol intoxication with hx of schizophrenia, etoh use d/o and cocaine use d/o came in by self reporting +SI  c CAH to jump in front of train or OD on pills. He does not look internally preoccupied and certainly seems in search of a place to stay but he does seem very poorly related with flat affect, prominent negative symptoms, and impoverished thought process. Says he gets seroquel 400 mg q12h in addition [facility]zod[facility] He says he is a  and went to [facility] but no beds. He reports many "overdoses" on seroquel but usually only 4-5 pills. Most recently at [facility] but says he has had actual admissions at [place] and [facility] and also stayed at [facility] for 1 month. Says he was diagnosed as a young man with schizophrenia at [facility]. Has no supports and denies having any collaterals he can provide. Says he drinks 2 pints of vodka a day and last drank last night. He also reports recent cocaine (although UTOX was negative and BAL nil). He is interested in substance abuse rehab. Has scars on head whose origin he did not know. possible hx of TBI . Past head CTS reviewed and have shown scalp contusions and some parenchymal volume loss.    ;;12/13:  patient has AH and SI but cognition is intact.  States that he has hypothyroidism but not on meds; wants Seroquel.  Discussed with medicine.  Will order TSH and FT4.   Start on Seroquel at night for psychosis and mood.      ;;12/17: wants more Seroquel for AH and SI;  however seems somewhat vague about SI and AH.  Raising Seroquel dose  ;;12/18 states he hears voices; raising Seroquel to 400mg at night with anticipation of a response at near 600mg.  If unsuccessful may need to switch.  However affect is inappropriate and possibility of secondary gains must also be considered.  ;;12/19: continues to endorse AH despite inapprop affect ; raising Seroquel to 500mg at night.  ;;12/20: continues to endorse AH ; raising Seroquel to 600mg at night; in view of persistently elevated bp will consult medicine re need for an antihypertensive medication.
57 YO M c multiple ED visits for alcohol intoxication with hx of schizophrenia, etoh use d/o and cocaine use d/o came in by self reporting +SI  c CAH to jump in front of train or OD on pills. He does not look internally preoccupied and certainly seems in search of a place to stay but he does seem very poorly related with flat affect, prominent negative symptoms, and impoverished thought process. Says he gets seroquel 400 mg q12h in addition [facility]zod[facility] He says he is a  and went to [facility] but no beds. He reports many "overdoses" on seroquel but usually only 4-5 pills. Most recently at [facility] but says he has had actual admissions at [place] and [facility] and also stayed at [facility] for 1 month. Says he was diagnosed as a young man with schizophrenia at [facility]. Has no supports and denies having any collaterals he can provide. Says he drinks 2 pints of vodka a day and last drank last night. He also reports recent cocaine (although UTOX was negative and BAL nil). He is interested in substance abuse rehab. Has scars on head whose origin he did not know. possible hx of TBI . Past head CTS reviewed and have shown scalp contusions and some parenchymal volume loss.    ;;12/13:  patient has AH and SI but cognition is intact.  States that he has hypothyroidism but not on meds; wants Seroquel.  Discussed with medicine.  Will order TSH and FT4.   Start on Seroquel at night for psychosis and mood.      ;;12/17: wants more Seroquel for AH and SI;  however seems somewhat vague about SI and AH.  Raising Seroquel dose  ;;12/18 states he hears voices; raising Seroquel to 400mg at night with anticipation of a response at near 600mg.  If unsuccessful may need to switch.  However affect is inappropriate and possibility of secondary gains must also be considered.  ;;12/19: continues to endorse AH despite inapprop affect ; raising Seroquel to 500mg at night.  ;;12/20: continues to endorse AH ; raising Seroquel to 600mg at night; in view of persistently elevated bp will consult medicine re need for an antihypertensive medication.  ;;12/21: little change today; if little progress consider switch to a different SGA.  12/24: endorses AH, intermittently command to hurt self.  will inc seroquel to 700mg qhs.  poor sleep, requests increase in trazodone.  12/26: endorses AH, intermittently command to hurt self.  will inc seroquel to 800mg qhs.
59 YO M c multiple ED visits for alcohol intoxication with hx of schizophrenia, etoh use d/o and cocaine use d/o came in by self reporting +SI  c CAH to jump in front of train or OD on pills. He does not look internally preoccupied and certainly seems in search of a place to stay but he does seem very poorly related with flat affect, prominent negative symptoms, and impoverished thought process. Says he gets seroquel 400 mg q12h in addition [facility]od[facility] He says he is a  and went to [facility] but no beds. He reports many "overdoses" on seroquel but usually only 4-5 pills. Most recently at [facility] but says he has had actual admissions at [place] and [facility] and also stayed at [facility] for 1 month. Says he was diagnosed as a young man with schizophrenia at [facility]. Has no supports and denies having any collaterals he can provide. Says he drinks 2 pints of vodka a day and last drank last night. He also reports recent cocaine (although UTOX was negative and BAL nil). He is interested in substance abuse rehab. Has scars on head whose origin he did not know. possible hx of TBI . Past head CTS reviewed and have shown scalp contusions and some parenchymal volume loss.    ;;12/13:  patient has AH and SI but cognition is intact.  States that he has hypothyroidism but not on meds; wants Seroquel.  Discussed with medicine.  Will order TSH and FT4.   Start on Seroquel at night for psychosis and mood.      ;;12/17: wants more Seroquel for AH and SI;  however seems somewhat vague about SI and AH.  Raising Seroquel dose  ;;12/18 states he hears voices; raising Seroquel to 400mg at night with anticipation of a response at near 600mg.  If unsuccessful may need to switch.  However affect is inappropriate and possibility of secondary gains must also be considered.  ;;12/19: continues to endorse AH despite inapprop affect ; raising Seroquel to 500mg at night.  ;;12/20: continues to endorse AH ; raising Seroquel to 600mg at night; in view of persistently elevated bp will consult medicine re need for an antihypertensive medication.  ;;12/21: little change today; if little progress consider switch to a different SGA.  12/24: endorses , intermittently command to hurt self.  will inc seroquel to 700mg qhs.  poor sleep, requests increase in trazodone.
59 YO M c multiple ED visits for alcohol intoxication with hx of schizophrenia, etoh use d/o and cocaine use d/o came in by self reporting +SI  c CAH to jump in front of train or OD on pills. He does not look internally preoccupied and certainly seems in search of a place to stay but he does seem very poorly related with flat affect, prominent negative symptoms, and impoverished thought process. Says he gets seroquel 400 mg q12h [facility] He says he is a  and went to [facility] but no beds. He reports many "overdoses" on seroquel but usually only 4-5 pills. Most recently at [facility] but says he has had actual admissions at [place] and [facility] and also stayed at [facility] for 1 month. Says he was diagnosed as a young man with schizophrenia at [facility]. Has no supports and denies having any collaterals he can provide. Says he drinks 2 pints of vodka a day and last drank last night. He also reports recent cocaine (although UTOX was negative and BAL nil). He is interested in substance abuse rehab. Has scars on head whose origin he did not know. possible hx of TBI . Past head CTS reviewed and have shown scalp contusions and some parenchymal volume loss.    ;;12/13:  patient has AH and SI but cognition is intact.  States that he has hypothyroidism but not on meds; wants Seroquel.  Discussed with medicine.  Will order TSH and FT4.   Start on Seroquel at night for psychosis and mood.      ;;12/17: wants more Seroquel for AH and SI;  however seems somewhat vague about SI and AH.  Raising Seroquel dose  ;;12/18 states he hears voices; raising Seroquel to 400mg at night with anticipation of a response at near 600mg.  If unsuccessful may need to switch.  However affect is inappropriate and possibility of secondary gains must also be considered.  ;;12/19: continues to endorse AH despite inapprop affect ; raising Seroquel to 500mg at night.  ;;12/20: continues to endorse AH ; raising Seroquel to 600mg at night; in view of persistently elevated bp will consult medicine re need for an antihypertensive medication.  ;;12/21: little change today; if little progress consider switch to a different SGA.
57 YO M c multiple ED visits for alcohol intoxication with hx of schizophrenia, etoh use d/o and cocaine use d/o came in by self reporting +SI  c CAH to jump in front of train or OD on pills. He does not look internally preoccupied and certainly seems in search of a place to stay but he does seem very poorly related with flat affect, prominent negative symptoms, and impoverished thought process. Says he gets seroquel 400 mg q12h in [facility] He says he is a  and went to [facility] but no beds. He reports many "overdoses" on seroquel but usually only 4-5 pills. Most recently at [facility] but says he has had actual admissions at [place] and [facility] and also stayed at [facility] for 1 month. Says he was diagnosed as a young man with schizophrenia at [facility]. Has no supports and denies having any collaterals he can provide. Says he drinks 2 pints of vodka a day and last drank last night. He also reports recent cocaine (although UTOX was negative and BAL nil). He is interested in substance abuse rehab. Has scars on head whose origin he did not know. possible hx of TBI . Past head CTS reviewed and have shown scalp contusions and some parenchymal volume loss.    ;;12/13:  patient has AH and SI but cognition is intact.  States that he has hypothyroidism but not on meds; wants Seroquel.  Discussed with medicine.  Will order TSH and FT4.   Start on Seroquel at night for psychosis and mood.      ;;12/17: wants more Seroquel for AH and SI;  however seems somewhat vague about SI and AH.  Raising Seroquel dose  ;;12/18 states he hears voices; raising Seroquel to 400mg at night with anticipation of a response at near 600mg.  If unsuccessful may need to switch.  However affect is inappropriate and possibility of secondary gains must also be considered.  ;;12/19: continues to endorse AH despite inapprop affect ; raising Seroquel to 500mg at night.  ;;12/20: continues to endorse AH ; raising Seroquel to 600mg at night; in view of persistently elevated bp will consult medicine re need for an antihypertensive medication.  ;;12/21: little change today; if little progress consider switch to a different SGA.  12/24: endorses AH, intermittently command to hurt self.  will inc seroquel to 700mg qhs.  poor sleep, requests increase in trazodone.  12/26: endorses AH, intermittently command to hurt self.  will inc seroquel to 800mg qhs.  ;;12/31: endorses AH but no SI; focused on leaving in 2 days; mood and eye contact improved.

## 2021-05-18 NOTE — PROGRESS NOTE BEHAVIORAL HEALTH - PROBLEM SELECTOR PROBLEM 2
LMTCB. Please transfer to triage.
Hypothyroidism, unspecified type

## 2021-05-26 NOTE — BEHAVIORAL HEALTH ASSESSMENT NOTE - MEDICAL RECORD REVIEWED
Hugh Martin, Just BREANNA I told Pat to follow up with you about her newer episodes of left sided abdominal pain. Unclear etiology at this point but given her location, perhaps kidney related. Acute work up of like a stone or whatever is really not chronic pain management's wheelhouse so I told her to f/u with you. I know she said she's due for a visit with you so I would expect her to mention this when she sees you. Thanks, Gabby Foote PA-C No

## 2021-09-01 ENCOUNTER — OUTPATIENT (OUTPATIENT)
Dept: OUTPATIENT SERVICES | Facility: HOSPITAL | Age: 61
LOS: 1 days | End: 2021-09-01
Payer: MEDICARE

## 2021-09-09 ENCOUNTER — EMERGENCY (EMERGENCY)
Facility: HOSPITAL | Age: 61
LOS: 1 days | Discharge: HOME | End: 2021-09-09
Attending: EMERGENCY MEDICINE | Admitting: EMERGENCY MEDICINE
Payer: MEDICARE

## 2021-09-09 VITALS
DIASTOLIC BLOOD PRESSURE: 70 MMHG | OXYGEN SATURATION: 100 % | HEART RATE: 58 BPM | SYSTOLIC BLOOD PRESSURE: 150 MMHG | WEIGHT: 210.1 LBS | TEMPERATURE: 98 F | RESPIRATION RATE: 16 BRPM | HEIGHT: 67 IN

## 2021-09-09 VITALS
OXYGEN SATURATION: 100 % | TEMPERATURE: 99 F | SYSTOLIC BLOOD PRESSURE: 156 MMHG | DIASTOLIC BLOOD PRESSURE: 106 MMHG | HEART RATE: 96 BPM | RESPIRATION RATE: 18 BRPM

## 2021-09-09 DIAGNOSIS — F17.200 NICOTINE DEPENDENCE, UNSPECIFIED, UNCOMPLICATED: ICD-10-CM

## 2021-09-09 DIAGNOSIS — F20.9 SCHIZOPHRENIA, UNSPECIFIED: ICD-10-CM

## 2021-09-09 DIAGNOSIS — Z20.822 CONTACT WITH AND (SUSPECTED) EXPOSURE TO COVID-19: ICD-10-CM

## 2021-09-09 DIAGNOSIS — Z86.19 PERSONAL HISTORY OF OTHER INFECTIOUS AND PARASITIC DISEASES: ICD-10-CM

## 2021-09-09 DIAGNOSIS — E03.9 HYPOTHYROIDISM, UNSPECIFIED: ICD-10-CM

## 2021-09-09 DIAGNOSIS — R45.851 SUICIDAL IDEATIONS: ICD-10-CM

## 2021-09-09 DIAGNOSIS — F10.10 ALCOHOL ABUSE, UNCOMPLICATED: ICD-10-CM

## 2021-09-09 DIAGNOSIS — F32.9 MAJOR DEPRESSIVE DISORDER, SINGLE EPISODE, UNSPECIFIED: ICD-10-CM

## 2021-09-09 DIAGNOSIS — Z87.898 PERSONAL HISTORY OF OTHER SPECIFIED CONDITIONS: ICD-10-CM

## 2021-09-09 LAB
ALBUMIN SERPL ELPH-MCNC: 4.4 G/DL — SIGNIFICANT CHANGE UP (ref 3.3–5)
ALP SERPL-CCNC: 111 U/L — SIGNIFICANT CHANGE UP (ref 40–120)
ALT FLD-CCNC: 97 U/L — HIGH (ref 10–45)
AMPHET UR-MCNC: NEGATIVE — SIGNIFICANT CHANGE UP
ANION GAP SERPL CALC-SCNC: 13 MMOL/L — SIGNIFICANT CHANGE UP (ref 5–17)
AST SERPL-CCNC: 65 U/L — HIGH (ref 10–40)
BARBITURATES UR SCN-MCNC: NEGATIVE — SIGNIFICANT CHANGE UP
BASOPHILS # BLD AUTO: 0.04 K/UL — SIGNIFICANT CHANGE UP (ref 0–0.2)
BASOPHILS NFR BLD AUTO: 0.4 % — SIGNIFICANT CHANGE UP (ref 0–2)
BENZODIAZ UR-MCNC: POSITIVE
BILIRUB SERPL-MCNC: 0.6 MG/DL — SIGNIFICANT CHANGE UP (ref 0.2–1.2)
BUN SERPL-MCNC: 15 MG/DL — SIGNIFICANT CHANGE UP (ref 7–23)
CALCIUM SERPL-MCNC: 9.9 MG/DL — SIGNIFICANT CHANGE UP (ref 8.4–10.5)
CHLORIDE SERPL-SCNC: 98 MMOL/L — SIGNIFICANT CHANGE UP (ref 96–108)
CO2 SERPL-SCNC: 24 MMOL/L — SIGNIFICANT CHANGE UP (ref 22–31)
COCAINE METAB.OTHER UR-MCNC: NEGATIVE — SIGNIFICANT CHANGE UP
CREAT SERPL-MCNC: 0.84 MG/DL — SIGNIFICANT CHANGE UP (ref 0.5–1.3)
EOSINOPHIL # BLD AUTO: 0.08 K/UL — SIGNIFICANT CHANGE UP (ref 0–0.5)
EOSINOPHIL NFR BLD AUTO: 0.8 % — SIGNIFICANT CHANGE UP (ref 0–6)
ETHANOL SERPL-MCNC: <10 MG/DL — SIGNIFICANT CHANGE UP (ref 0–10)
GLUCOSE SERPL-MCNC: 114 MG/DL — HIGH (ref 70–99)
HCT VFR BLD CALC: 43.9 % — SIGNIFICANT CHANGE UP (ref 39–50)
HGB BLD-MCNC: 14.7 G/DL — SIGNIFICANT CHANGE UP (ref 13–17)
IMM GRANULOCYTES NFR BLD AUTO: 0.5 % — SIGNIFICANT CHANGE UP (ref 0–1.5)
LYMPHOCYTES # BLD AUTO: 1.8 K/UL — SIGNIFICANT CHANGE UP (ref 1–3.3)
LYMPHOCYTES # BLD AUTO: 16.9 % — SIGNIFICANT CHANGE UP (ref 13–44)
MCHC RBC-ENTMCNC: 30.6 PG — SIGNIFICANT CHANGE UP (ref 27–34)
MCHC RBC-ENTMCNC: 33.5 GM/DL — SIGNIFICANT CHANGE UP (ref 32–36)
MCV RBC AUTO: 91.5 FL — SIGNIFICANT CHANGE UP (ref 80–100)
METHADONE UR-MCNC: NEGATIVE — SIGNIFICANT CHANGE UP
MONOCYTES # BLD AUTO: 0.99 K/UL — HIGH (ref 0–0.9)
MONOCYTES NFR BLD AUTO: 9.3 % — SIGNIFICANT CHANGE UP (ref 2–14)
NEUTROPHILS # BLD AUTO: 7.67 K/UL — HIGH (ref 1.8–7.4)
NEUTROPHILS NFR BLD AUTO: 72.1 % — SIGNIFICANT CHANGE UP (ref 43–77)
NRBC # BLD: 0 /100 WBCS — SIGNIFICANT CHANGE UP (ref 0–0)
OPIATES UR-MCNC: NEGATIVE — SIGNIFICANT CHANGE UP
PCP SPEC-MCNC: SIGNIFICANT CHANGE UP
PCP UR-MCNC: NEGATIVE — SIGNIFICANT CHANGE UP
PLATELET # BLD AUTO: 273 K/UL — SIGNIFICANT CHANGE UP (ref 150–400)
POTASSIUM SERPL-MCNC: 3.8 MMOL/L — SIGNIFICANT CHANGE UP (ref 3.5–5.3)
POTASSIUM SERPL-SCNC: 3.8 MMOL/L — SIGNIFICANT CHANGE UP (ref 3.5–5.3)
PROT SERPL-MCNC: 8.6 G/DL — HIGH (ref 6–8.3)
RBC # BLD: 4.8 M/UL — SIGNIFICANT CHANGE UP (ref 4.2–5.8)
RBC # FLD: 14 % — SIGNIFICANT CHANGE UP (ref 10.3–14.5)
SARS-COV-2 RNA SPEC QL NAA+PROBE: NEGATIVE — SIGNIFICANT CHANGE UP
SODIUM SERPL-SCNC: 135 MMOL/L — SIGNIFICANT CHANGE UP (ref 135–145)
THC UR QL: POSITIVE
WBC # BLD: 10.63 K/UL — HIGH (ref 3.8–10.5)
WBC # FLD AUTO: 10.63 K/UL — HIGH (ref 3.8–10.5)

## 2021-09-09 PROCEDURE — 99285 EMERGENCY DEPT VISIT HI MDM: CPT

## 2021-09-09 PROCEDURE — 87635 SARS-COV-2 COVID-19 AMP PRB: CPT

## 2021-09-09 PROCEDURE — 80053 COMPREHEN METABOLIC PANEL: CPT

## 2021-09-09 PROCEDURE — 93005 ELECTROCARDIOGRAM TRACING: CPT

## 2021-09-09 PROCEDURE — 36415 COLL VENOUS BLD VENIPUNCTURE: CPT

## 2021-09-09 PROCEDURE — 99284 EMERGENCY DEPT VISIT MOD MDM: CPT

## 2021-09-09 PROCEDURE — 85025 COMPLETE CBC W/AUTO DIFF WBC: CPT

## 2021-09-09 PROCEDURE — 80307 DRUG TEST PRSMV CHEM ANLYZR: CPT

## 2021-09-09 NOTE — ED ADULT NURSE NOTE - OBJECTIVE STATEMENT
pt. presents with c/o SI x 1 week, no specific plan, as per triage note, d/c from [facility] with same complaint this morning

## 2021-09-09 NOTE — ED PROVIDER NOTE - ATTENDING CONTRIBUTION TO CARE
Attending Statement: I have personally performed a face to face diagnostic evaluation on this patient. I have reviewed the ACP note and agree with the history, exam and plan of care, except as noted.     Attending Contribution to Care:  61M with above PMHX who p/w c/o SI. No clinical signs of withdrawal. He reports SI; however, was discharged from a separate facility where he was cleared by psychiatry and immediately came to [facility]. Obtained collateral from psychiatrist Dr. Stokes and  Lilly. Pt has plan in place to go to [facility] then detox. Discussed this with pt and provided with information. There is no indication for emergent psych consult or admission at this time. Pt stable for DC. Return precautions given.

## 2021-09-09 NOTE — ED ADULT NURSE NOTE - CHIEF COMPLAINT QUOTE
suicidal thought for  a week . Pt was seen and d/jan from [facility] this morning for same problem.

## 2021-09-09 NOTE — ED ADULT NURSE NOTE - NSIMPLEMENTINTERV_GEN_ALL_ED
Implemented All Universal Safety Interventions:  [place] to call system. Call bell, personal items and telephone within reach. Instruct patient to call for assistance. Room bathroom lighting operational. Non-slip footwear when patient is off stretcher. Physically safe environment: no spills, clutter or unnecessary equipment. Stretcher in lowest position, wheels locked, appropriate side rails in place.

## 2021-09-09 NOTE — ED ADULT TRIAGE NOTE - CHIEF COMPLAINT QUOTE
suicidal thought for  a week . Pt was seen and d/jan from [facility] this morning for same problem.

## 2021-09-09 NOTE — ED PROVIDER NOTE - PHYSICAL EXAMINATION
VITAL SIGNS: I have reviewed nursing notes and confirm.  CONSTITUTIONAL: Well-developed; in no acute distress.   SKIN:  warm and dry, no acute rash.   HEAD:  normocephalic, atraumatic.  EYES: PERRL, EOM intact; conjunctiva and sclera clear.  ENT: No nasal discharge; airway clear.   NECK: Supple; non tender.  CARD: S1, S2 normal; no murmurs, gallops, or rubs. Regular rate and rhythm.   RESP:  Clear to auscultation b/l, no wheezes, rales or rhonchi.  ABD: Normal bowel sounds; soft; non-distended; non-tender; no guarding/ rebound.  EXT: Normal ROM. No clubbing, cyanosis or edema. 2+ pulses to b/l ue/le.  NEURO: Alert, oriented, grossly unremarkable. No tremors or tongue fasciculations noted.   PSYCH: Depressed affect. Reports SI, denies HI or AVH.

## 2021-09-09 NOTE — ED PROVIDER NOTE - CLINICAL SUMMARY MEDICAL DECISION MAKING FREE TEXT BOX
Pt afebrile and hemodynamically stable. No clinical signs of withdrawal. He reports SI; however, was discharged from a separate facility where he was cleared by psychiatry and immediately came to [facility]. Obtained collateral from psychiatrist Dr. Stokes and  Lilly. Pt has plan in place to go to [facility] then detox. Discussed this with pt and provided with information. Return precautions given.

## 2021-09-09 NOTE — ED PROVIDER NOTE - NS ED ROS FT
Constitutional: No fever. No chills.  Eyes: No redness. No discharge. No vision change.   ENT: No sore throat. No ear pain.  Cardiovascular: No chest pain. No leg swelling.  Respiratory: No cough. No shortness of breath.  GI: No abdominal pain. No vomiting. No diarrhea.   MSK: No joint pain. No back pain.   Skin: No rash. No abrasions.   Neuro: No numbness. No weakness.   Psych: +depression. +SI. No HI. No hallucinations.

## 2021-09-09 NOTE — ED PROVIDER NOTE - OBJECTIVE STATEMENT
62yo male with pmhx of depression, ETOH abuse (drinks 1 pint daily) presents endorsing suicidal thoughts. Pt reports 1 week of worsening thoughts, non-compliant with seroquel and trazodone. He denies hallucinations and HI. He denies recent hospitalizations, requested to stay in hospital today. He is currently homeless. He reports he drank etoh yesterday, denies illicit drug use.    Collateral obtained from psychiatrist Dr. Stokes at [facility] who evaluated pt last night. She states pt has history of malingering. She reports he was admitted for 3wk at [facility] in August, discharged approximately 1wk ago and since that time, [facility]. He was evaluated last night by Psychiatry, felt safe for discharge and coordinated plan with  to go directly to [facility] then detox. When asked about this plan, pt states he would prefer to stay in hospital than shelter.

## 2021-09-09 NOTE — ED PROVIDER NOTE - PATIENT PORTAL LINK FT
You can access the FollowMyHealth Patient Portal offered by [facility] by registering at the following website: [email or web address]. By joining [employer]’s FollowMyHealth portal, you will also be able to view your health information using other applications (apps) compatible with our system.

## 2021-09-09 NOTE — ED PROVIDER NOTE - NSFOLLOWUPINSTRUCTIONS_ED_ALL_ED_FT
Please go to [facility] after discharge:  [address] [place] [address]  [phone]    You were provided with information regarding detox programs during your visit last night.     Please take your trazodone and seroquel as prescribed.     Return to the emergency department if you develop worsening thoughts of harming yourself or others, chest pain, shortness of breath, passing out, numbness, weakness or any other concerns.

## 2021-09-16 DIAGNOSIS — Z71.89 OTHER SPECIFIED COUNSELING: ICD-10-CM

## 2021-12-01 PROCEDURE — G9005: CPT

## 2021-12-11 ENCOUNTER — EMERGENCY (EMERGENCY)
Facility: HOSPITAL | Age: 61
LOS: 1 days | Discharge: HOME | End: 2021-12-11
Attending: EMERGENCY MEDICINE | Admitting: EMERGENCY MEDICINE
Payer: MEDICARE

## 2021-12-11 VITALS
RESPIRATION RATE: 18 BRPM | HEIGHT: 67 IN | TEMPERATURE: 98 F | SYSTOLIC BLOOD PRESSURE: 115 MMHG | DIASTOLIC BLOOD PRESSURE: 75 MMHG | OXYGEN SATURATION: 98 % | HEART RATE: 91 BPM

## 2021-12-11 DIAGNOSIS — F10.129 ALCOHOL ABUSE WITH INTOXICATION, UNSPECIFIED: ICD-10-CM

## 2021-12-11 PROCEDURE — 99284 EMERGENCY DEPT VISIT MOD MDM: CPT

## 2021-12-11 NOTE — ED PROVIDER NOTE - NSFOLLOWUPINSTRUCTIONS_ED_ALL_ED_FT
-PLEASE FOLLOW-UP WITH YOUR PRIMARY CARE DOCTOR IN 1-2 DAYS.  BRING ALL PAPERWORK FROM TODAY'S VISIT TO YOUR FOLLOW-UP VISIT.     -PLEASE RETURN TO THE ER IMMEDIATELY OR CALL 911 FOR ANY HIGH FEVER, TROUBLE BREATHING, VOMITING, SEVERE PAIN, OR ANY OTHER CONCERNS.

## 2021-12-11 NOTE — ED PROVIDER NOTE - PROGRESS NOTE DETAILS
The patient is now awake and alert, clinically sober.  Able to walk a straight line.  Repeat exam and neuro/cranial nerve exams normal.  No evidence of head/neck trauma.  Patient denies any pain or other complaints.  Denies cp/sob/ha/abd pain.  Abd soft, lungs clear, heart exam normal.  Chang po challenge.  Patient says only used alcohol no other substances.  Denies any assault.  Feels much better and pt feels safe for discharge.  No evidence of intoxication at this time or alcohol withdrawal.  No other complaints on discharge.  HCT findings discussed with pt.  Made aware of hydrocephalus and results printed for him.  Will take to his PCP/primary clinic.

## 2021-12-11 NOTE — ED PROVIDER NOTE - PHYSICAL EXAMINATION
General: lethargic, arousable to touch, smells of alcohol  Head: NCAT  Eyes: PERRL  ENT: Airway clear  Heart: RRR  Lungs: CTAB  Abd: soft, NTND  Neuro: moves all 4 extremities equally  Skin: no e/o acute lacerations, abrasions, or ecchymoses.  +Healing lac, ~1.5cm to R lateral eyebrow.

## 2021-12-11 NOTE — ED PROVIDER NOTE - OBJECTIVE STATEMENT
Pt BIB by EMS for ETOH intoxication.  Admits to heavy ETOH use today, no other complaints.  Noted small lac to R eyebrow that has dried blood covering it - pt denies any knowledge of injury.  Denies HA.  Placed in stretcher and quickly falls asleep.  Unable to cooperate with remainder of history due to clinical condition/AMS.

## 2021-12-11 NOTE — ED ADULT NURSE NOTE - OBJECTIVE STATEMENT
Pt BIBA for AMS, admits to ETOH abuse. Pt is responsive to verbal stimuli. Airway is patent and breathing is spontaneous and unlabored. Dried blood/wound noted to right eyebrow. Pt placed in stretcher with bed alarm, stretcher in the lowest position, in view of nurses station. Will monitor pt closely

## 2021-12-11 NOTE — ED PROVIDER NOTE - CLINICAL SUMMARY MEDICAL DECISION MAKING FREE TEXT BOX
Alcohol intoxication, given noted injury we will perform HCT to r/o SDH, not hypoglycemic, neuro exam non-focal, will observe and reassess frequently.

## 2021-12-11 NOTE — ED PROVIDER NOTE - PATIENT PORTAL LINK FT
You can access the FollowMyHealth Patient Portal offered by [facility] by registering at the following website: [email or web address]. By joining [employer]’s FollowMyHealth portal, you will also be able to view your health information using other applications (apps) compatible with our system.

## 2021-12-12 ENCOUNTER — EMERGENCY (EMERGENCY)
Facility: HOSPITAL | Age: 61
LOS: 1 days | Discharge: HOME | End: 2021-12-12
Attending: EMERGENCY MEDICINE | Admitting: EMERGENCY MEDICINE
Payer: MEDICARE

## 2021-12-12 VITALS
SYSTOLIC BLOOD PRESSURE: 153 MMHG | HEART RATE: 100 BPM | DIASTOLIC BLOOD PRESSURE: 86 MMHG | TEMPERATURE: 97 F | RESPIRATION RATE: 18 BRPM | OXYGEN SATURATION: 99 % | HEIGHT: 67 IN

## 2021-12-12 VITALS
OXYGEN SATURATION: 97 % | DIASTOLIC BLOOD PRESSURE: 74 MMHG | SYSTOLIC BLOOD PRESSURE: 119 MMHG | TEMPERATURE: 98 F | HEART RATE: 84 BPM | RESPIRATION RATE: 18 BRPM

## 2021-12-12 DIAGNOSIS — Z59.00 HOMELESSNESS UNSPECIFIED: ICD-10-CM

## 2021-12-12 DIAGNOSIS — F20.9 SCHIZOPHRENIA, UNSPECIFIED: ICD-10-CM

## 2021-12-12 DIAGNOSIS — R45.851 SUICIDAL IDEATIONS: ICD-10-CM

## 2021-12-12 DIAGNOSIS — F10.10 ALCOHOL ABUSE, UNCOMPLICATED: ICD-10-CM

## 2021-12-12 DIAGNOSIS — F19.90 OTHER PSYCHOACTIVE SUBSTANCE USE, UNSPECIFIED, UNCOMPLICATED: ICD-10-CM

## 2021-12-12 DIAGNOSIS — F43.20 ADJUSTMENT DISORDER, UNSPECIFIED: ICD-10-CM

## 2021-12-12 DIAGNOSIS — F19.94 OTHER PSYCHOACTIVE SUBSTANCE USE, UNSPECIFIED WITH PSYCHOACTIVE SUBSTANCE-INDUCED MOOD DISORDER: ICD-10-CM

## 2021-12-12 DIAGNOSIS — F14.10 COCAINE ABUSE, UNCOMPLICATED: ICD-10-CM

## 2021-12-12 DIAGNOSIS — R44.0 AUDITORY HALLUCINATIONS: ICD-10-CM

## 2021-12-12 DIAGNOSIS — E03.9 HYPOTHYROIDISM, UNSPECIFIED: ICD-10-CM

## 2021-12-12 DIAGNOSIS — Z20.822 CONTACT WITH AND (SUSPECTED) EXPOSURE TO COVID-19: ICD-10-CM

## 2021-12-12 LAB
AMPHET UR-MCNC: NEGATIVE — SIGNIFICANT CHANGE UP
APPEARANCE UR: CLEAR — SIGNIFICANT CHANGE UP
BARBITURATES UR SCN-MCNC: NEGATIVE — SIGNIFICANT CHANGE UP
BENZODIAZ UR-MCNC: NEGATIVE — SIGNIFICANT CHANGE UP
BILIRUB UR-MCNC: NEGATIVE — SIGNIFICANT CHANGE UP
COCAINE METAB.OTHER UR-MCNC: POSITIVE
COLOR SPEC: YELLOW — SIGNIFICANT CHANGE UP
DIFF PNL FLD: NEGATIVE — SIGNIFICANT CHANGE UP
GLUCOSE UR QL: NEGATIVE — SIGNIFICANT CHANGE UP
KETONES UR-MCNC: NEGATIVE — SIGNIFICANT CHANGE UP
LEUKOCYTE ESTERASE UR-ACNC: NEGATIVE — SIGNIFICANT CHANGE UP
METHADONE UR-MCNC: NEGATIVE — SIGNIFICANT CHANGE UP
NITRITE UR-MCNC: NEGATIVE — SIGNIFICANT CHANGE UP
OPIATES UR-MCNC: NEGATIVE — SIGNIFICANT CHANGE UP
PCP SPEC-MCNC: SIGNIFICANT CHANGE UP
PCP UR-MCNC: NEGATIVE — SIGNIFICANT CHANGE UP
PH UR: 6 — SIGNIFICANT CHANGE UP (ref 5–8)
PROT UR-MCNC: NEGATIVE MG/DL — SIGNIFICANT CHANGE UP
SARS-COV-2 RNA SPEC QL NAA+PROBE: NEGATIVE — SIGNIFICANT CHANGE UP
SP GR SPEC: 1.01 — SIGNIFICANT CHANGE UP (ref 1–1.03)
THC UR QL: NEGATIVE — SIGNIFICANT CHANGE UP
UROBILINOGEN FLD QL: 0.2 E.U./DL — SIGNIFICANT CHANGE UP

## 2021-12-12 PROCEDURE — 70450 CT HEAD/BRAIN W/O DYE: CPT | Mod: 26

## 2021-12-12 PROCEDURE — 93010 ELECTROCARDIOGRAM REPORT: CPT

## 2021-12-12 PROCEDURE — 99285 EMERGENCY DEPT VISIT HI MDM: CPT

## 2021-12-12 SDOH — ECONOMIC STABILITY - HOUSING INSECURITY: HOMELESSNESS UNSPECIFIED: Z59.00

## 2021-12-12 NOTE — ED ADULT TRIAGE NOTE - ARRIVAL INFO ADDITIONAL COMMENTS
pt c/o feeling suicidal.   [facility] but states they did not [facility] him because he goes there too often.

## 2021-12-12 NOTE — ED PROVIDER NOTE - NSFOLLOWUPINSTRUCTIONS_ED_ALL_ED_FT
Follow up with your physician and psychiatry this week.                       Depression    WHAT YOU NEED TO KNOW:    Depression is a medical condition that causes feelings of sadness or hopelessness that do not go away. Depression may cause you to lose interest in things you used to enjoy. These feelings may interfere with your daily life.    DISCHARGE INSTRUCTIONS:    Call your local emergency number (911 in the ) if:   •You think about harming yourself or someone else.      •You have done something on purpose to hurt yourself.      Call your therapist or doctor if:   •Your symptoms do not improve.      •You cannot make it to your next appointment.       •You have new symptoms.      •You have questions or concerns about your condition or care.      The following resources are available at any time to help you, if needed:   •National Suicide Prevention Lifeline: 1-626.774.1022 (9-866-281-TALK)      •Suicide Hotline: 1-495.290.7813 (6-997-HVQYQUR)      •For a list of international numbers: https://save.org/find-help/international-resources/      Medicines:   •Antidepressants may be given to improve or balance your mood. You may need to take this medicine for several weeks before you begin to feel better.      •Take your medicine as directed. Contact your healthcare provider if you think your medicine is not helping or if you have side effects. Tell him of her if you are allergic to any medicine. Keep a list of the medicines, vitamins, and herbs you take. Include the amounts, and when and why you take them. Bring the list or the pill bottles to follow-up visits. Carry your medicine list with you in case of an emergency.      Therapy is often used together with medicine to relieve depression. Therapy is a way for you to talk about your feelings and anything that may be causing depression. Therapy can be done alone or in a group. It may also be done with family members or a significant other.    Self-care:   •Get regular physical activity. Try to be active for 30 minutes, 3 to 5 days a week. Physical activity can help relieve depression. Work with your healthcare provider to develop a plan that you enjoy. It may help to ask someone to be active with you.      •Create a regular sleep schedule. A routine can help you relax before bed. Listen to music, read, or do yoga. Try to go to bed and wake up at the same time every day. Sleep is important for emotional health.      •Eat a variety of healthy foods. Healthy foods include fruits, vegetables, whole-grain breads, low-fat dairy products, lean meats, fish, and cooked beans. A healthy meal plan is low in fat, salt, and added sugar.      •Do not drink alcohol or use drugs. Alcohol and drugs can make depression worse. Talk to your therapist or doctor if you need help quitting.      Follow up with your healthcare provider as directed: Your healthcare provider will monitor your progress at follow-up visits. He or she will also monitor your medicine if you take antidepressants. Your healthcare provider will ask if the medicine is helping. Tell him or her about any side effects or problems you may have with your medicine. The type or amount of medicine may need to be changed. Write down your questions so you remember to ask them during your visits.       © Copyright [employer] 2021           back to top                          © Copyright [employer] 2021

## 2021-12-12 NOTE — ED PROVIDER NOTE - CLINICAL SUMMARY MEDICAL DECISION MAKING FREE TEXT BOX
Pt presents requesting psych admission for SI / HI. + PSA, undomiciled. Pt has 2 other hospital bands on, one from [facility] last night where he was T&R'd for alcohol intoxication, and a 2nd one from earlier today at OSH. Denies physical complaints. No apparent alcohol w/d at this time. Psych med clearance w/u, 1:1. Dispo pending w/u, clinical status, psych recommendations

## 2021-12-12 NOTE — ED PROVIDER NOTE - PROGRESS NOTE DETAILS
Attempted to contact in-house psych, left message Ree: pt received from night team at s/o. Pt p/w si, seen by telepsych and deemed likely admission. Pt to be re-evaluated by day time psych. Dispo pending. Will continue to monitor. pt re evaluated in ED by psych and  cleared for d/c home

## 2021-12-12 NOTE — ED ADULT TRIAGE NOTE - ESI TRIAGE ACUITY LEVEL, MLM
28 F w persistent nausea/fever- last night had n/v/diarrhea- multiple episodes of diarrhea- came to the ED, treated w ivf and d/c'ed  augustine po today, rice and banana, but still w fever and nausea  no abd pain, no vag bld/d/c  no back pain  no exac/allev factors  tylenol taken at 4 pm today
2

## 2021-12-12 NOTE — ED PROVIDER NOTE - PHYSICAL EXAMINATION
Constitutional: Well appearing, awake, alert, oriented to person, place, time/situation and in no apparent distress.  ENMT: Airway patent. no tongue fasciculations  Eyes: Clear bilaterally  Cardiac: Normal rate, regular rhythm.   Respiratory: No increased WOB, tachypnea, hypoxia, or accessory mm use. Pt speaks in full sentences.   Musculoskeletal: Range of motion is not limited  Neuro: Alert and oriented x 3, face symmetric and speech fluent. Nml gross motor movement, grossly non focal . mild tremor L hand only. no generalized tremors.   Skin: Skin normal color for race, warm, dry and intact. No evidence of rash.  Psych: Alert and oriented to person, place, time/situation. normal mood and affect. no apparent risk to self or others.

## 2021-12-12 NOTE — ED ADULT NURSE REASSESSMENT NOTE - NS ED NURSE REASSESS COMMENT FT1
Transfer of care acknowledged with bedside rounding, sleeping, easily arousable. pending metabolization of substances

## 2021-12-12 NOTE — ED ADULT NURSE REASSESSMENT NOTE - NS ED NURSE REASSESS COMMENT FT1
Pt received from Whitney PADILLA. Pt resting comfortably in bed. No s/s of acute distress. Will continue to monitor

## 2021-12-12 NOTE — ED ADULT NURSE NOTE - OBJECTIVE STATEMENT
Pt presenting to er with c/o suicidal thoughts. Pt states that he hears voices are telling him to kill himself. Pt admits drinking alcohol, last drink yesterday, uses cocaine and smokes marijuana. Pt denies ha, anxiety, dizziness, fever, chills. Pt placed on 1:1 in triage area for SI. Pt A&Ox4, ambulatory with steady gait, speaking in clear/full sentences, no acute distress, vital signs stable.

## 2021-12-12 NOTE — ED PROVIDER NOTE - PATIENT PORTAL LINK FT
You can access the FollowMyHealth Patient Portal offered by [facility] by registering at the following website: [email or web address]. By joining [employer]’s FollowMyHealth portal, you will also be able to view your health information using other applications (apps) compatible with our system.

## 2021-12-13 VITALS
SYSTOLIC BLOOD PRESSURE: 126 MMHG | DIASTOLIC BLOOD PRESSURE: 82 MMHG | TEMPERATURE: 99 F | HEART RATE: 96 BPM | RESPIRATION RATE: 18 BRPM | OXYGEN SATURATION: 100 %

## 2021-12-13 LAB
ALBUMIN SERPL ELPH-MCNC: 4.2 G/DL — SIGNIFICANT CHANGE UP (ref 3.3–5)
ALP SERPL-CCNC: 110 U/L — SIGNIFICANT CHANGE UP (ref 40–120)
ALT FLD-CCNC: 55 U/L — HIGH (ref 10–45)
ANION GAP SERPL CALC-SCNC: 18 MMOL/L — HIGH (ref 5–17)
APAP SERPL-MCNC: <5 UG/ML — LOW (ref 10–30)
AST SERPL-CCNC: 51 U/L — HIGH (ref 10–40)
BASOPHILS # BLD AUTO: 0.02 K/UL — SIGNIFICANT CHANGE UP (ref 0–0.2)
BASOPHILS NFR BLD AUTO: 0.2 % — SIGNIFICANT CHANGE UP (ref 0–2)
BILIRUB SERPL-MCNC: 0.2 MG/DL — SIGNIFICANT CHANGE UP (ref 0.2–1.2)
BUN SERPL-MCNC: 10 MG/DL — SIGNIFICANT CHANGE UP (ref 7–23)
CALCIUM SERPL-MCNC: 9.5 MG/DL — SIGNIFICANT CHANGE UP (ref 8.4–10.5)
CHLORIDE SERPL-SCNC: 102 MMOL/L — SIGNIFICANT CHANGE UP (ref 96–108)
CO2 SERPL-SCNC: 21 MMOL/L — LOW (ref 22–31)
CREAT SERPL-MCNC: 0.87 MG/DL — SIGNIFICANT CHANGE UP (ref 0.5–1.3)
EOSINOPHIL # BLD AUTO: 0.07 K/UL — SIGNIFICANT CHANGE UP (ref 0–0.5)
EOSINOPHIL NFR BLD AUTO: 0.7 % — SIGNIFICANT CHANGE UP (ref 0–6)
ETHANOL SERPL-MCNC: 122 MG/DL — HIGH (ref 0–10)
GLUCOSE SERPL-MCNC: 115 MG/DL — HIGH (ref 70–99)
HCT VFR BLD CALC: 37.8 % — LOW (ref 39–50)
HGB BLD-MCNC: 12.8 G/DL — LOW (ref 13–17)
IMM GRANULOCYTES NFR BLD AUTO: 0.3 % — SIGNIFICANT CHANGE UP (ref 0–1.5)
LYMPHOCYTES # BLD AUTO: 2.99 K/UL — SIGNIFICANT CHANGE UP (ref 1–3.3)
LYMPHOCYTES # BLD AUTO: 30.6 % — SIGNIFICANT CHANGE UP (ref 13–44)
MCHC RBC-ENTMCNC: 31.2 PG — SIGNIFICANT CHANGE UP (ref 27–34)
MCHC RBC-ENTMCNC: 33.9 GM/DL — SIGNIFICANT CHANGE UP (ref 32–36)
MCV RBC AUTO: 92.2 FL — SIGNIFICANT CHANGE UP (ref 80–100)
MONOCYTES # BLD AUTO: 1.19 K/UL — HIGH (ref 0–0.9)
MONOCYTES NFR BLD AUTO: 12.2 % — SIGNIFICANT CHANGE UP (ref 2–14)
NEUTROPHILS # BLD AUTO: 5.48 K/UL — SIGNIFICANT CHANGE UP (ref 1.8–7.4)
NEUTROPHILS NFR BLD AUTO: 56 % — SIGNIFICANT CHANGE UP (ref 43–77)
NRBC # BLD: 0 /100 WBCS — SIGNIFICANT CHANGE UP (ref 0–0)
PLATELET # BLD AUTO: 388 K/UL — SIGNIFICANT CHANGE UP (ref 150–400)
POTASSIUM SERPL-MCNC: 3.7 MMOL/L — SIGNIFICANT CHANGE UP (ref 3.5–5.3)
POTASSIUM SERPL-SCNC: 3.7 MMOL/L — SIGNIFICANT CHANGE UP (ref 3.5–5.3)
PROT SERPL-MCNC: 8 G/DL — SIGNIFICANT CHANGE UP (ref 6–8.3)
RBC # BLD: 4.1 M/UL — LOW (ref 4.2–5.8)
RBC # FLD: 14.6 % — HIGH (ref 10.3–14.5)
SALICYLATES SERPL-MCNC: <0.3 MG/DL — LOW (ref 2.8–20)
SODIUM SERPL-SCNC: 141 MMOL/L — SIGNIFICANT CHANGE UP (ref 135–145)
WBC # BLD: 9.78 K/UL — SIGNIFICANT CHANGE UP (ref 3.8–10.5)
WBC # FLD AUTO: 9.78 K/UL — SIGNIFICANT CHANGE UP (ref 3.8–10.5)

## 2021-12-13 PROCEDURE — 85025 COMPLETE CBC W/AUTO DIFF WBC: CPT

## 2021-12-13 PROCEDURE — 81003 URINALYSIS AUTO W/O SCOPE: CPT

## 2021-12-13 PROCEDURE — 80307 DRUG TEST PRSMV CHEM ANLYZR: CPT

## 2021-12-13 PROCEDURE — 99285 EMERGENCY DEPT VISIT HI MDM: CPT

## 2021-12-13 PROCEDURE — 36415 COLL VENOUS BLD VENIPUNCTURE: CPT

## 2021-12-13 PROCEDURE — 87635 SARS-COV-2 COVID-19 AMP PRB: CPT

## 2021-12-13 PROCEDURE — 99284 EMERGENCY DEPT VISIT MOD MDM: CPT | Mod: 25

## 2021-12-13 PROCEDURE — 90792 PSYCH DIAG EVAL W/MED SRVCS: CPT | Mod: 95

## 2021-12-13 PROCEDURE — 93005 ELECTROCARDIOGRAM TRACING: CPT

## 2021-12-13 PROCEDURE — 80053 COMPREHEN METABOLIC PANEL: CPT

## 2021-12-13 RX ORDER — QUETIAPINE FUMARATE 200 MG/1
300 TABLET, FILM COATED ORAL ONCE
Refills: 0 | Status: COMPLETED | OUTPATIENT
Start: 2021-12-13 | End: 2021-12-13

## 2021-12-13 RX ADMIN — QUETIAPINE FUMARATE 300 MILLIGRAM(S): 200 TABLET, FILM COATED ORAL at 04:11

## 2021-12-13 NOTE — ED BEHAVIORAL HEALTH ASSESSMENT NOTE - DESCRIPTION (FIRST USE, LAST USE, QUANTITY, FREQUENCY, DURATION)
September 23, 2021     Patient: Bee Max   YOB: 2009   Date of Visit: 9/23/2021       To Whom it May Concern:    Bee Max was seen in my clinic on 9/23/2021 at 11:30 am.     Please excuse Bee for his absence from school on the date listed above to be able to make his appointment.    Sincerely,         Elis Harding CNP    Medical information is confidential and cannot be disclosed without the written consent of the patient or his representative.       5 cigarettes daily

## 2021-12-13 NOTE — ED BEHAVIORAL HEALTH ASSESSMENT NOTE - VIOLENCE RISK FACTORS:
Feeling of being under threat and being unable to control threat/Substance abuse/Command hallucinations for violent behavior Substance abuse

## 2021-12-13 NOTE — BH CONSULTATION LIAISON PROGRESS NOTE - NSBHCHARTREVIEWVS_PSY_A_CORE FT
Vital Signs Last 24 Hrs  T(C): 36.4 (13 Dec 2021 04:15), Max: 36.6 (12 Dec 2021 22:56)  T(F): 97.6 (13 Dec 2021 04:15), Max: 97.8 (12 Dec 2021 22:56)  HR: 78 (13 Dec 2021 04:15) (78 - 100)  BP: 128/76 (13 Dec 2021 04:15) (119/77 - 153/86)  BP(mean): --  RR: 18 (13 Dec 2021 04:15) (18 - 18)  SpO2: 99% (13 Dec 2021 04:15) (99% - 100%)

## 2021-12-13 NOTE — ED BEHAVIORAL HEALTH ASSESSMENT NOTE - SUMMARY
60YO male, undomiciled, single without children, unemployed on SSDI,  ; PMHx hypothyroidism, HCV; schizophrenia/schizoaffective disorder, possibly substance-induced psychosis, two reported suicide attempts in past by overdose, several psychiatric hospitalizations (most recently [place] 8/9/21-9/1/21), frequent ED visits at [facility] [place], alcohol+cocaine use disorders, no known/reported alcohol withdrawal seizures/DTs, hx incarceration (per [facility] records), who self presented with SI, CAH to jump in front of a train in the setting of daily alcohol and crack cocaine use. 62YO male, undomiciled, single without children, unemployed on SSDI,  ; PMHx hypothyroidism, HCV; schizophrenia/schizoaffective disorder, possibly substance-induced psychosis, two reported suicide attempts in past by overdose, several psychiatric hospitalizations (most recently [place] 8/9/21-9/1/21), frequent ED visits at [place]/[facility], alcohol+cocaine use disorders, no known/reported alcohol withdrawal seizures/DTs, hx incarceration (per [place] records), who self presented with SI, CAH to jump in front of a train in the setting of daily alcohol and crack cocaine use.    Pt endorsing CAH telling him to kill himself, active SI currently. These symptoms may be secondary to daily cocaine, marijuana and alcohol use. Would benefit pt to receive PO seroquel 300 mg for reported psychosis symptoms and re-evaluate when he has metabolized substances. If symptoms persisting, can consider inpatient psychiatric hospitalization at that time.

## 2021-12-13 NOTE — ED BEHAVIORAL HEALTH ASSESSMENT NOTE - RISK ASSESSMENT
high acute risk currently - endorsing CAH, SI, ongoing and recent substance use.   high chronic risk as well - hx of suicide attempts reported, hx of psych admissions, homelessness. High Acute Suicide Risk

## 2021-12-13 NOTE — BH CONSULTATION LIAISON PROGRESS NOTE - NSBHFUPINTERVALHXFT_PSY_A_CORE
Pt seen with MS3 Papo.  Pt is alert, attentive, in NAD, [facility] with [facility] 1[facility], got refill for seroquel (had not run out weeks ago).  He describes chronic AH and SI that was no different today than at his appointment last week, and that his psychiatrist monitors him.  Pt future oriented, seeking to continue outpt appts, also part[facility].  He has an assigned shelter but prefers to stay elsewhere.  Pt shows no acute sx currently that warrant hospitalization; srinivas pt is realty-based, attentive, not manic/psychotic/depressed severely, not intoxicated or delirious.  He reports recent (and chronic) daily alcohol and cocaine use; not under influence or withdrawing currently.  [facility] had [facility] and not getting admitted.      team d/w Lilly NOVAK, who reported that pt was in [facility] less than 24h ago with same presentation with no need for inpt admission.  Pt also had 12/11 visit to the ER with no SI or AH.  Pt has [address] shelter assignment and is [facility].  Pt has been observed over time in the ER with no signs of acute psychosis, safety issues, etc.  Per pt's recent behavior, he seems to come to ERs with no dangerous behavior (recent note lists many [facility] visits but few admissions), and pt is help seeking, no acute dangerousness foreseeable upon discharge at this time.

## 2021-12-13 NOTE — BH CONSULTATION LIAISON PROGRESS NOTE - NSBHCONSULTFOLLOWAFTERCARE_PSY_A_CORE FT
Pt will [facility]up with his [facility] within 5 days of discharge especially to increase treatment resources (eg therapy)

## 2021-12-13 NOTE — ED BEHAVIORAL HEALTH ASSESSMENT NOTE - OTHER PAST PSYCHIATRIC HISTORY (INCLUDE DETAILS REGARDING ONSET, COURSE OF ILLNESS, INPATIENT/OUTPATIENT TREATMENT)
[facility]. Patient's presentation incongruent with observed behavior.  Reported hospitalized at [facility] 3 months ago and 6 months ago for overdose. [facility] reported he presented November 2018, was intoxicated with CAH and SI, held overnight and discharged. Admitted to [facility] in 2016.  Follows up at [facility] Last hospitalization at [place] Aug-Sept 2021. Prior to that[facility].   Intermittent follow up with [facility].

## 2021-12-13 NOTE — ED BEHAVIORAL HEALTH ASSESSMENT NOTE - ADDITIONAL DETAILS ALL
Pt is unrelieable hisotrian. As per [facility] there is no evidence of pt's past SA's. Pt has hx/o endorsing SI and CAH to gain admission. endorsing CAH/SI as per previous visits

## 2021-12-13 NOTE — ED BEHAVIORAL HEALTH ASSESSMENT NOTE - HPI (INCLUDE ILLNESS QUALITY, SEVERITY, DURATION, TIMING, CONTEXT, MODIFYING FACTORS, ASSOCIATED SIGNS AND SYMPTOMS)
62YO male, undomiciled, single without children, unemployed  , with a medical history of hypothyroidism, hepatitis C, with a psychiatric history of self reported schizophrenia paranoid type (as per [place] pt with likely primary substance use disorder), alcohol and (crack) cocaine use disorders, self reported history of 2 suicide attempts in past by overdose (as per [place] this is unverified), history of several [facility]  [facility], frequent ED visits at [place] (pt has not been admitted there since 2016), [place], [facility], history of alcohol abuse with withdrawal tremors but no reported seizures or delirium tremens, hx/o incarceration as per [place] records, who BIBS with complaints of command auditory hallucinations to jump in front of a train in the setting of medication/outpatient care non compliance, daily alcohol use, and recent crack cocaine use. Pt is unreliable historian and provides conflicting information. Collateral information was obtained from [facility]. As per ED psychiatrist at [place] pt has hx/o multiple visits to the ED where he presents intoxicated with similar complaints of SI and CAH, which are thought to be secondary to substance use. Pt has documented hx/o malingering, non compliance with outpatient treatment. Pt has been referred multiple times to outpatient and inpatient substance abuse programs. Pt goes to multiple ED's in [place] requesting admission likely to gain housing. As per [place] pt has over 75 visits and two total psych hospitalizations. He has not been willing to engage in any meaningful outpatient follow up and has not shown motivation for substance abuse treatment.   Per on-call psychiatrist at [facility], pt has had multiple admissions to their Dual Diagnosis Clinic most recently discharged on August 27th 2019 with additional admissions on Aug 7th and in June and July 2019. Their records indicate pt has diagnoses of Borderline Intellectual Functioning as well as ongoing etoh use d/o and cocaine use d/o. Additionally, pt has issues with his housing and often c/o of suicidality and hearing voices. Pt does not have an outpatient psychiatrist but received hydroxyzine, Seroquel, sertraline, and trazodone on his last discharge from the Dual D/o unit.     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 She was not aware of any diagnosis of schizophrenia but was unsure and not involved in his care. Pt does not have a phone and his mom did not know of any providers or friends that he sees regularly. Stated that pt will occasionally call her to say he is “nervous” and “depressed.” 60YO male, undomiciled, single without children, unemployed  , with a medical history of hypothyroidism, hepatitis C, with a psychiatric history of self reported schizophrenia paranoid type (as per [place] pt with likely primary substance use disorder), alcohol and (crack) cocaine use disorders, self reported history of 2 suicide attempts in past by overdose (as per [place] this is unverified), history of several [facility]  [facility], frequent ED visits at [place] (pt has not been admitted there since 2016), [place], [facility], [facility], history of alcohol abuse with withdrawal tremors but no reported seizures or delirium tremens, hx/o incarceration as per [place] records, who BIBS with complaints of command auditory hallucinations to jump in front of a train in the setting of medication/outpatient care non compliance, daily alcohol use, and recent crack cocaine use. Pt is unreliable historian and provides conflicting information. Collateral information was obtained from [facility]. As per ED psychiatrist at [place] pt has hx/o multiple visits to the ED where he presents intoxicated with similar complaints of SI and CAH, which are thought to be secondary to substance use. Pt has documented hx/o malingering, non compliance with outpatient treatment. Pt has been referred multiple times to outpatient and inpatient substance abuse programs. Pt goes to multiple ED's in [place] requesting admission likely to gain housing. As per [place] pt has over 75 visits and two total psych hospitalizations. He has not been willing to engage in any meaningful outpatient follow up and has not shown motivation for substance abuse treatment.   Per on-call psychiatrist at [facility], pt has had multiple admissions to their Dual Diagnosis Clinic most recently discharged on August 27th 2019 with additional admissions on Aug 7th and in June and July 2019. Their records indicate pt has diagnoses of Borderline Intellectual Functioning as well as ongoing etoh use d/o and cocaine use d/o. Additionally, pt has issues with his housing and often c/o of suicidality and hearing voices. Pt does not have an outpatient psychiatrist but received hydroxyzine, Seroquel, sertraline, and trazodone on his last discharge from the Dual D/o unit.     Seroquel 300 mg, trazodone and hydroxyzine. Ran out of medication about one week ago.     Overdosed on pills last month - suicide attempt and admitted to [place] inpatient.    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 She was not aware of any diagnosis of schizophrenia but was unsure and not involved in his care. Pt does not have a phone and his mom did not know of any providers or friends that he sees regularly. Stated that pt will occasionally call her to say he is “nervous” and “depressed.”    COVID questions:  received vaccine a few weeks ago.  no travel. no contacts. no tests. unclear antibodies. 62YO male, undomiciled, single without children, unemployed on SSDI,  ; PMHx hypothyroidism, HCV; schizophrenia/schizoaffective disorder, possibly substance-induced psychosis, two reported suicide attempts in past by overdose, several psychiatric hospitalizations (most recently [place] 8/9/21-9/1/21), frequent ED visits at [place]/[facility], alcohol+cocaine use disorders, no known/reported alcohol withdrawal seizures/DTs, hx incarceration (per [place] records), who self presented with SI, CAH to jump in front of a train in the setting of daily alcohol and crack cocaine use.     Usually taking Seroquel 300 mg, trazodone and hydroxyzine. Ran out of medication about 1-2 weeks ago per pt.     Overdosed on pills last month as suicide attempt and admitted to [facility] inpatient.    Pt gives consent to speak with mother Kate ([phone]) for collateral information. Attempted to call.    Per chart review:   "As per [place] pt has over 75 visits and two total psych hospitalizations. He has not been willing to engage in any meaningful outpatient follow up and has not shown motivation for substance abuse treatment."    COVID questions:  received vaccine a few weeks ago.  no travel. no contacts. no tests. unclear antibodies. 62YO male, undomiciled, single without children, unemployed on SSDI,  ; PMHx hypothyroidism, HCV; schizophrenia/schizoaffective disorder, possibly substance-induced psychosis, two reported suicide attempts in past by overdose, several psychiatric hospitalizations (most recently [place] 8/9/21-9/1/21), frequent ED visits at [place]/[facility], alcohol+cocaine use disorders, no known/reported alcohol withdrawal seizures/DTs, hx incarceration (per [place] records), who self presented with SI, CAH to jump in front of a train in the setting of daily alcohol and crack cocaine use.     Pt seems to be a limited historian but cooperative, polite. States he has been more depressed, hearing voices telling him to kill himself, stating he is considering jumping in front of the train - reports these symptoms worsening in the past week. Usually taking Seroquel 300 mg, trazodone and hydroxyzine. Ran out of medication about 1-2 weeks ago per pt. [facility]. He admits to cocaine, marijuana, and alcohol (1 pint) per day. Denies withdrawal seizures/DTs in past. Reports htat he overdosed on pills about 1-2 months ago as suicide attempt and admitted to [place] inpatient at that time. He otherwise denies HI. Denies VH. Denies manic symptoms.     Pt gives consent to speak with mother Kate ([phone]) for collateral information. Attempted to call.    Per chart review:   "As per [place] pt has over 75 visits and two total psych hospitalizations. He has not been willing to engage in any meaningful outpatient follow up and has not shown motivation for substance abuse treatment."    COVID questions:  received vaccine a few weeks ago.  no travel. no contacts. no tests. unclear antibodies.

## 2021-12-13 NOTE — ED BEHAVIORAL HEALTH ASSESSMENT NOTE - MOOD
Bedside and Verbal shift change report given to [address] (oncoming nurse) by [facility] (offgoing nurse). Report included the following information SBAR, Kardex, ED Summary, Procedure Summary, Intake/Output, MAR and Recent Results. 2240: Critical Troponin 11.3, Dr. Rufino pemberton, orders to d/c stress test received, make pt NPO at 12a and repeat troponin with morning labs, will continue to monitor    0430: Rapid Response team called due to chest pain similar to what it felt like prior to admission, EKG completed, will continue to monitor    0700: Pt transferred to cath lab    Bedside and Verbal shift change report given to [address] Acuña (oncoming nurse) by David Batres RN (offgoing nurse). Report included the following information SBAR, Kardex, ED Summary, Procedure Summary, Intake/Output, MAR and Recent Results. Normal Depressed

## 2021-12-13 NOTE — ED BEHAVIORAL HEALTH ASSESSMENT NOTE - DETAILS
[facility] telling him to kill himself spoke to ED provider self

## 2021-12-13 NOTE — ED ADULT NURSE REASSESSMENT NOTE - NS ED NURSE REASSESS COMMENT FT1
1:1 initiated in triage, clothes removed and placed in belongings bags.
Sandwith and soda provided. Blood collected by [facility]. Urine and covid swab sent to lab.
Upon arrival, patient placed on constant observation for suicidal or homicidal ideation, psychiatric consult pending. Patient undressed, wanded, valuables secured. Patient maintained in a safe environment.
PT seen by Psychiatry department at bedside. 1:1 observation discontinued at this time. PT clothing given back to him, pending discharge papers at this time. PT advised he does have a psychiatrist which he can follow up with at this time,
Patient endorsed in the ED from previous shift. PT on 1:1 continuous evaluation for SI. PT gowned , on chair in HW7, Pending re-evaluation by Psychiatry at this time for disposition.

## 2021-12-13 NOTE — ED BEHAVIORAL HEALTH ASSESSMENT NOTE - DESCRIPTION
Street undomiciled hep c, hyperthyroidism HCV, hypothyroidism see [facility] note undomiciled for past six months; prior to that was living with his mother

## 2021-12-13 NOTE — ED BEHAVIORAL HEALTH ASSESSMENT NOTE - CURRENT MEDICATION
Non compliant x 1 week  Quetiapine 300 mg po qhs  Trazodone 150 mg po qhs  Gabapentin 300 mg po qhs  Zolpidem 10 mg po qhs (ISTOP does not show this) none for past 1-2 weeks per pt

## 2021-12-13 NOTE — BH CONSULTATION LIAISON PROGRESS NOTE - NSBHASSESSMENTFT_PSY_ALL_CORE
"62YO male, undomiciled, single without children, unemployed on SSDI,  ; PMHx hypothyroidism, HCV; schizophrenia/schizoaffective disorder, possibly substance-induced psychosis, two reported suicide attempts in past by overdose, several psychiatric hospitalizations (most recently [place] 8/9/21-9/1/21), frequent ED visits at [place]/[facility], alcohol+cocaine use disorders, no known/reported alcohol withdrawal seizures/DTs, hx incarceration (per [place] records), who self presented with SI, CAH to jump in front of a train in the setting of daily alcohol and crack cocaine use."    Upon reassessment, pt shows no acute SI or psychosis, not intoxicated, is well-reasoned and articulate, in NAD.  No active SI/intent/plan.  Pt observed and in good behavioral control, good frustration tolerance.  Collateral supports pt going to various ERs seeking admission related to homelessness.  Pt not interested in further resources to address housing concerns.  No indication for inpatient level of care; pt will followup with established outpt care, srinivas urging substance abuse resources.    No grounds to hold involuntarily.  Pt cleared for discharge.

## 2022-01-05 ENCOUNTER — INPATIENT (INPATIENT)
Facility: HOSPITAL | Age: 62
LOS: 13 days | Discharge: HOME | DRG: 885 | End: 2022-01-19
Attending: PSYCHIATRY & NEUROLOGY | Admitting: PSYCHIATRY & NEUROLOGY
Payer: MEDICARE

## 2022-01-05 VITALS
RESPIRATION RATE: 18 BRPM | DIASTOLIC BLOOD PRESSURE: 82 MMHG | WEIGHT: 199.96 LBS | TEMPERATURE: 98 F | HEIGHT: 67 IN | OXYGEN SATURATION: 98 % | HEART RATE: 80 BPM | SYSTOLIC BLOOD PRESSURE: 152 MMHG

## 2022-01-05 DIAGNOSIS — F19.90 OTHER PSYCHOACTIVE SUBSTANCE USE, UNSPECIFIED, UNCOMPLICATED: ICD-10-CM

## 2022-01-05 LAB
ALBUMIN SERPL ELPH-MCNC: 4.2 G/DL — SIGNIFICANT CHANGE UP (ref 3.3–5)
ALP SERPL-CCNC: 88 U/L — SIGNIFICANT CHANGE UP (ref 40–120)
ALT FLD-CCNC: 48 U/L — HIGH (ref 10–45)
AMPHET UR-MCNC: NEGATIVE — SIGNIFICANT CHANGE UP
ANION GAP SERPL CALC-SCNC: 16 MMOL/L — SIGNIFICANT CHANGE UP (ref 5–17)
APAP SERPL-MCNC: <5 UG/ML — LOW (ref 10–30)
APPEARANCE UR: CLEAR — SIGNIFICANT CHANGE UP
AST SERPL-CCNC: 57 U/L — HIGH (ref 10–40)
BARBITURATES UR SCN-MCNC: NEGATIVE — SIGNIFICANT CHANGE UP
BENZODIAZ UR-MCNC: POSITIVE
BILIRUB SERPL-MCNC: 0.4 MG/DL — SIGNIFICANT CHANGE UP (ref 0.2–1.2)
BILIRUB UR-MCNC: NEGATIVE — SIGNIFICANT CHANGE UP
BUN SERPL-MCNC: 10 MG/DL — SIGNIFICANT CHANGE UP (ref 7–23)
CALCIUM SERPL-MCNC: 9.8 MG/DL — SIGNIFICANT CHANGE UP (ref 8.4–10.5)
CHLORIDE SERPL-SCNC: 99 MMOL/L — SIGNIFICANT CHANGE UP (ref 96–108)
CO2 SERPL-SCNC: 21 MMOL/L — LOW (ref 22–31)
COCAINE METAB.OTHER UR-MCNC: POSITIVE
COLOR SPEC: YELLOW — SIGNIFICANT CHANGE UP
CREAT SERPL-MCNC: 0.74 MG/DL — SIGNIFICANT CHANGE UP (ref 0.5–1.3)
DIFF PNL FLD: NEGATIVE — SIGNIFICANT CHANGE UP
ETHANOL SERPL-MCNC: <10 MG/DL — SIGNIFICANT CHANGE UP (ref 0–10)
GLUCOSE SERPL-MCNC: 95 MG/DL — SIGNIFICANT CHANGE UP (ref 70–99)
GLUCOSE UR QL: NEGATIVE — SIGNIFICANT CHANGE UP
HCT VFR BLD CALC: 40.6 % — SIGNIFICANT CHANGE UP (ref 39–50)
HGB BLD-MCNC: 13.4 G/DL — SIGNIFICANT CHANGE UP (ref 13–17)
KETONES UR-MCNC: NEGATIVE — SIGNIFICANT CHANGE UP
LEUKOCYTE ESTERASE UR-ACNC: NEGATIVE — SIGNIFICANT CHANGE UP
MCHC RBC-ENTMCNC: 30 PG — SIGNIFICANT CHANGE UP (ref 27–34)
MCHC RBC-ENTMCNC: 33 GM/DL — SIGNIFICANT CHANGE UP (ref 32–36)
MCV RBC AUTO: 90.8 FL — SIGNIFICANT CHANGE UP (ref 80–100)
METHADONE UR-MCNC: NEGATIVE — SIGNIFICANT CHANGE UP
NITRITE UR-MCNC: NEGATIVE — SIGNIFICANT CHANGE UP
NRBC # BLD: 0 /100 WBCS — SIGNIFICANT CHANGE UP (ref 0–0)
OPIATES UR-MCNC: NEGATIVE — SIGNIFICANT CHANGE UP
PCP SPEC-MCNC: SIGNIFICANT CHANGE UP
PCP UR-MCNC: NEGATIVE — SIGNIFICANT CHANGE UP
PH UR: 5.5 — SIGNIFICANT CHANGE UP (ref 5–8)
PLATELET # BLD AUTO: 389 K/UL — SIGNIFICANT CHANGE UP (ref 150–400)
POTASSIUM SERPL-MCNC: 3.7 MMOL/L — SIGNIFICANT CHANGE UP (ref 3.5–5.3)
POTASSIUM SERPL-SCNC: 3.7 MMOL/L — SIGNIFICANT CHANGE UP (ref 3.5–5.3)
PROT SERPL-MCNC: 7.8 G/DL — SIGNIFICANT CHANGE UP (ref 6–8.3)
PROT UR-MCNC: NEGATIVE MG/DL — SIGNIFICANT CHANGE UP
RBC # BLD: 4.47 M/UL — SIGNIFICANT CHANGE UP (ref 4.2–5.8)
RBC # FLD: 14.8 % — HIGH (ref 10.3–14.5)
SALICYLATES SERPL-MCNC: <0.3 MG/DL — LOW (ref 2.8–20)
SARS-COV-2 RNA SPEC QL NAA+PROBE: NEGATIVE — SIGNIFICANT CHANGE UP
SODIUM SERPL-SCNC: 136 MMOL/L — SIGNIFICANT CHANGE UP (ref 135–145)
SP GR SPEC: 1.01 — SIGNIFICANT CHANGE UP (ref 1–1.03)
THC UR QL: POSITIVE
UROBILINOGEN FLD QL: 0.2 E.U./DL — SIGNIFICANT CHANGE UP
WBC # BLD: 7.82 K/UL — SIGNIFICANT CHANGE UP (ref 3.8–10.5)
WBC # FLD AUTO: 7.82 K/UL — SIGNIFICANT CHANGE UP (ref 3.8–10.5)

## 2022-01-05 PROCEDURE — 93010 ELECTROCARDIOGRAM REPORT: CPT

## 2022-01-05 PROCEDURE — 99285 EMERGENCY DEPT VISIT HI MDM: CPT | Mod: 25

## 2022-01-05 PROCEDURE — 90792 PSYCH DIAG EVAL W/MED SRVCS: CPT

## 2022-01-05 RX ORDER — THIAMINE MONONITRATE (VIT B1) 100 MG
100 TABLET ORAL DAILY
Refills: 0 | Status: COMPLETED | OUTPATIENT
Start: 2022-01-05 | End: 2022-01-08

## 2022-01-05 RX ORDER — ACETAMINOPHEN 500 MG
650 TABLET ORAL EVERY 6 HOURS
Refills: 0 | Status: DISCONTINUED | OUTPATIENT
Start: 2022-01-05 | End: 2022-01-19

## 2022-01-05 RX ORDER — QUETIAPINE FUMARATE 200 MG/1
50 TABLET, FILM COATED ORAL EVERY 12 HOURS
Refills: 0 | Status: DISCONTINUED | OUTPATIENT
Start: 2022-01-05 | End: 2022-01-07

## 2022-01-05 RX ORDER — NICOTINE POLACRILEX 2 MG
2 GUM BUCCAL
Refills: 0 | Status: DISCONTINUED | OUTPATIENT
Start: 2022-01-05 | End: 2022-01-19

## 2022-01-05 RX ORDER — MAGNESIUM HYDROXIDE 400 MG/1
30 TABLET, CHEWABLE ORAL DAILY
Refills: 0 | Status: DISCONTINUED | OUTPATIENT
Start: 2022-01-05 | End: 2022-01-19

## 2022-01-05 RX ORDER — HALOPERIDOL DECANOATE 100 MG/ML
5 INJECTION INTRAMUSCULAR EVERY 6 HOURS
Refills: 0 | Status: DISCONTINUED | OUTPATIENT
Start: 2022-01-05 | End: 2022-01-19

## 2022-01-05 RX ORDER — TRAZODONE HCL 50 MG
100 TABLET ORAL AT BEDTIME
Refills: 0 | Status: DISCONTINUED | OUTPATIENT
Start: 2022-01-05 | End: 2022-01-07

## 2022-01-05 RX ORDER — FOLIC ACID 0.8 MG
1 TABLET ORAL DAILY
Refills: 0 | Status: DISCONTINUED | OUTPATIENT
Start: 2022-01-05 | End: 2022-01-19

## 2022-01-05 RX ORDER — QUETIAPINE FUMARATE 200 MG/1
50 TABLET, FILM COATED ORAL ONCE
Refills: 0 | Status: COMPLETED | OUTPATIENT
Start: 2022-01-05 | End: 2022-01-05

## 2022-01-05 RX ADMIN — QUETIAPINE FUMARATE 50 MILLIGRAM(S): 200 TABLET, FILM COATED ORAL at 21:26

## 2022-01-05 RX ADMIN — Medication 100 MILLIGRAM(S): at 21:24

## 2022-01-05 RX ADMIN — QUETIAPINE FUMARATE 50 MILLIGRAM(S): 200 TABLET, FILM COATED ORAL at 15:45

## 2022-01-05 NOTE — ED BEHAVIORAL HEALTH ASSESSMENT NOTE - SUMMARY
Patient is a 61-year-old male, street homeless, , history of alcohol use d/o, other substance use (cocaine, cannabis), ?psychotic disorder, frequent visits to emergency room with substance-induced complaint who presented himself to the ED with complaint of command AH and SI with intent and plan. Symptoms include low mood, anhedonia, low energy, insomnia ongoing at least 1 week. CAH started 1 week but patient is vague, fixated on admission to unit for suicidality. Objectively, mood reported as low but affect constricted and not dysphoric-appearing, his thoughts were linear and logical while he did not appear to actively experience any response to internal stimuli. Labs significant for UTox cocaine and THC and benzos. Collateral from mother suggests no history of known suicide attempts. Last seen at [place] on 1/4/22 for similar complaint and discharged the same day.

## 2022-01-05 NOTE — ED BEHAVIORAL HEALTH NOTE - BEHAVIORAL HEALTH NOTE
CC: "I'm still hearing voices telling me to kill myself."    HPI:     Patient is a 61 year old male, undomiciled, , on benefits, no children. He has history of schizophrenia vs schizoaffective disorder, bipolar type, substance use disorder (alcohol, cocaine, and marijuana) with history of possible substance induced psychosis/mood disorder. He has medical history of HCV and hypothyroidism. He brought self to ED for CAH telling him to kill himself, which he said he wants to follow through with.     Upon reassessment, he was found seated, sedated from recent administration of oral quetiapine. He was able to wake up, was A&Ox3 but would not engage in assessment of his working memory. Affect remained constricted, more fatigued than dysphoric, otherwise calmly seated and cooperative with assessment. He continued to complain of a male voice, telling him to kill himself. He reiterated what he had mentioned earlier to psychiatry, saying it started about a week ago, denying any other acute stressors. He said the voice is present throughout most of the day. When asked if it was difficult to resist the voice, he said yes, and said that he has intent to jump in front of a subway train were he to be discharged. He was unable to share any goal oriented thinking. He also stated that he had a similar episode about a month ago when he swallowed 10 tablets of quetiapine. He said that he was last admitted to [facility] about 2 months ago.  Said that he fell asleep and was able to continue his day when he woke up. He also complained of depressed mood, insomnia, fatigue. Denied recent manic episodes, denied HI, did endorse seeing shadows. [facility] of quetiapine 300 mg qdaily and trazodone 100 mg at night but does have a regular psychiatrist. When asked about interest in detox or rehab, he stated he could detox on the psych unit.      MSE:   Appearance: Disheveled, well-nourished  Behavior: Poor eye contact, falling asleep during times of the evaluation, cooperative, appeared he was chewing on something throughout the eval  Speech: Spoke softly, short sentences, appeared to be slurring  Mood: "It's been down"  Affect: Constricted, more fatigued than dysphoric, stable  Thought Pattern: Linear  Thought Content: SI, no HI, no delusions elicited, mild poverty  Perception: CAH and VH  Insight: Fair  Judgment: Poor  Cognition: A&Ox3, did not partake in eval of working memory    Formulation:   Patient is a 61 year old male, undomiciled, , on benefits, no children. He has history of schizophrenia vs schizoaffective disorder, bipolar type, substance use disorder (cocaine, marijuana, and ?benzodiazepines) with history of possible substance induced psychosis/mood disorder. He has medical history of HCV and hypothyroidism. He brought self to ED for CAH telling him to kill himself, which he said he wants to follow through with.     Despite documentation in chart that patient has history of malingering and typically having resolution of SI soon after presenting to EDs, including yesterday at [place], he continues to endorse active suicidality with plan and intent to jump in front of a subway train were he to be discharged. He is unable to contract for safety and has limited if any future oriented thinking. He also has depressed mood with insomnia and fatigue though this is more likely related to his daily substance use, which includes alcohol, cocaine, and marijuana (possible that benzodiazepines were given in other hospitals for withdrawal). He also complains of VH as well.     Patient has family history of substance use but no known history of mental illness or suicidality. He has chronic polysubstance use, which will likely lead to future presentations to hospitals given his chronic repetitive pattern. He also has poor overall social status as he is undomiciled, living on street, has disability, no clear support system (will need to talk with mother for this), and does not appear to be particularly motivated to follow up with outpatient providers.     Given his current active suicidality and significant lack of social support, he is at present unsafe to discharge as he is a danger to self. He will be admitted on a voluntary basis. He will be evaluated on a CIWA protocol for withdrawal with appropriate benzo administration. Will consider need for restarting standing antipsychotics based on how he presents after arrival to inpt unit.     Plan:   - Will admit to inpatient psychiatry on 9.13 basis   - Will start CIWA protocol  - Will consider restarting on quetiapine and trazodone based on evaluation once he arrives on unit CC: "I'm still hearing voices telling me to kill myself."    HPI:     Patient is a 61 year old male, undomiciled, , on benefits, no children. He has history of schizophrenia vs schizoaffective disorder, bipolar type, substance use disorder (alcohol, cocaine, and marijuana) with history of possible substance induced psychosis/mood disorder. He has medical history of HCV and hypothyroidism. He brought self to ED for CAH telling him to kill himself, which he said he wants to follow through with.     Upon reassessment, he was found seated, sedated from recent administration of oral quetiapine. He was able to wake up, was A&Ox3 but would not engage in assessment of his working memory. Affect remained constricted, more fatigued than dysphoric, otherwise calmly seated and cooperative with assessment. He continued to complain of a male voice, telling him to kill himself. He reiterated what he had mentioned earlier to psychiatry, saying it started about a week ago, denying any other acute stressors. He said the voice is present throughout most of the day. When asked if it was difficult to resist the voice, he said yes, and said that he has intent to jump in front of a subway train were he to be discharged. He was unable to share any goal oriented thinking. He also stated that he had a similar episode about a month ago when he swallowed 10 tablets of quetiapine. He said that he was last admitted to [facility] about 2 months ago.  Said that he fell asleep and was able to continue his day when he woke up. He also complained of depressed mood, insomnia, fatigue. Denied recent manic episodes, denied HI, did endorse seeing shadows. Said [facility] of quetiapine 300 mg qdaily and trazodone 100 mg at night but does have a regular psychiatrist. When asked about interest in detox or rehab, he stated he could detox on the psych unit.      MSE:   Appearance: Disheveled, well-nourished  Behavior: Poor eye contact, falling asleep during times of the evaluation, cooperative, appeared he was chewing on something throughout the eval  Speech: Spoke softly, short sentences, appeared to be slurring  Mood: "It's been down"  Affect: Constricted, more fatigued than dysphoric, stable  Thought Pattern: Linear  Thought Content: SI, no HI, no delusions elicited, mild poverty  Perception: CAH and VH  Insight: Fair  Judgment: Poor  Cognition: A&Ox3, did not partake in eval of working memory    Formulation:   Patient is a 61 year old male, undomiciled, , on benefits, no children. He has history of schizophrenia vs schizoaffective disorder, bipolar type, substance use disorder (cocaine, marijuana, and ?benzodiazepines) with history of possible substance induced psychosis/mood disorder. He has medical history of HCV and hypothyroidism. He brought self to ED for CAH telling him to kill himself, which he said he wants to follow through with.     Despite documentation in chart that patient has history of malingering and typically having resolution of SI soon after presenting to EDs, including yesterday at [place], he continues to endorse active suicidality with plan and intent to jump in front of a subway train were he to be discharged. He is unable to contract for safety and has limited if any future oriented thinking. He also has depressed mood with insomnia and fatigue though this is more likely related to his daily substance use, which includes alcohol, cocaine, and marijuana (possible that benzodiazepines were given in other hospitals for withdrawal). He also complains of VH as well.     Patient has family history of substance use but no known history of mental illness or suicidality. He has chronic polysubstance use, which will likely lead to future presentations to hospitals given his chronic repetitive pattern. He also has poor overall social status as he is undomiciled, living on street, has disability, no clear support system (will need to talk with mother for this), and does not appear to be particularly motivated to follow up with outpatient providers.     Given his current active suicidality and significant lack of social support, he is at present unsafe to discharge as he is a danger to self. He will be admitted on a voluntary basis. He will be evaluated on a [facility] protocol for withdrawal with appropriate benzo administration. Will consider need for restarting standing antipsychotics based on how he presents after arrival to inpt unit. Based on history of being a , chronic psych history, chronic substance use, head trauma in 2020, and noted age-related generalized parenchymal volume loss, may benefit from further neurocognitive workup, at minimum including MMSE/MOCA.     Plan:   - Will admit to inpatient psychiatry on 9.13   - Will start CIWA protocol  - Will consider restarting on quetiapine and trazodone based on evaluation once he arrives on unit  - If agitated and withdrawing, would prefer benzos to manage agitation, but otherwise can use Haldol/Ativan. Will need EKG for baseline QTc CC: "I'm still hearing voices telling me to kill myself."    HPI:     Patient is a 61 year old male, undomiciled, , on benefits, no children. He has history of schizophrenia vs schizoaffective disorder, bipolar type, substance use disorder (alcohol, cocaine, and marijuana) with history of possible substance induced psychosis/mood disorder. He has medical history of HCV and hypothyroidism. He brought self to ED for CAH telling him to kill himself, which he said he wants to follow through with.     Upon reassessment, he was found seated, sedated from recent administration of oral quetiapine. He was able to wake up, was A&Ox3 but would not engage in assessment of his working memory. Affect remained constricted, more fatigued than dysphoric, otherwise calmly seated and cooperative with assessment. He continued to complain of a male voice, telling him to kill himself. He reiterated what he had mentioned earlier to psychiatry, saying it started about a week ago, denying any other acute stressors. He said the voice is present throughout most of the day. When asked if it was difficult to resist the voice, he said yes, and said that he has intent to jump in front of a subway train were he to be discharged. He was unable to share any goal oriented thinking. He also stated that he had a similar episode about a month ago when he swallowed 10 tablets of quetiapine. He said that he was last admitted to [facility] about 2 months ago.  Said that he fell asleep and was able to continue his day when he woke up. He also complained of depressed mood, insomnia, fatigue. Denied recent manic episodes, denied HI, did endorse seeing shadows. Said [facility] medications of quetiapine 300 mg qdaily and trazodone 100 mg at night but does have a regular psychiatrist. When asked about interest in detox or rehab, he stated he could detox on the psych unit.      MSE:   Appearance: Disheveled, well-nourished  Behavior: Poor eye contact, falling asleep during times of the evaluation, cooperative, appeared he was chewing on something throughout the eval  Speech: Spoke softly, short sentences, appeared to be slurring  Mood: "It's been down"  Affect: Constricted, more fatigued than dysphoric, stable  Thought Pattern: Linear  Thought Content: SI, no HI, no delusions elicited, mild poverty  Perception: CAH and VH  Insight: Fair  Judgment: Poor  Cognition: A&Ox3, did not partake in eval of working memory    Formulation:   Patient is a 61 year old male, undomiciled, , on benefits, no children. He has history of schizophrenia vs schizoaffective disorder, bipolar type, substance use disorder (cocaine, marijuana, and ?benzodiazepines) with history of possible substance induced psychosis/mood disorder. He has medical history of HCV and hypothyroidism. He brought self to ED for CAH telling him to kill himself, which he said he wants to follow through with.     Despite documentation in chart that patient has history of malingering and typically having resolution of SI soon after presenting to EDs, including yesterday at [place], he continues to endorse active suicidality with plan and intent to jump in front of a subway train were he to be discharged. He is unable to contract for safety and has limited if any future oriented thinking. He also has depressed mood with insomnia and fatigue though this is more likely related to his daily substance use, which includes alcohol, cocaine, and marijuana (possible that benzodiazepines were given in other hospitals for withdrawal). He also complains of VH as well.     Patient has family history of substance use but no known history of mental illness or suicidality. He has chronic polysubstance use, which will likely lead to future presentations to hospitals given his chronic repetitive pattern. He also has poor overall social status as he is undomiciled, living on street, has disability, no clear support system (will need to talk with mother for this), and does not appear to be particularly motivated to follow up with outpatient providers.     Given his current active suicidality and significant lack of social support, he is at present unsafe to discharge as he is a danger to self. He will be admitted on a voluntary basis. He will be evaluated on a [facility] protocol for withdrawal with appropriate benzo administration. Will consider need for restarting standing antipsychotics based on how he presents after arrival to inpt unit. Based on history of being a , chronic psych history, chronic substance use, head trauma in 2020, and noted age-related generalized parenchymal volume loss, may benefit from further neurocognitive workup, at minimum including MMSE/MOCA.     Plan:   - Will admit to inpatient psychiatry on 9.13 for suicidality  - Will start CIWA protocol  - Will consider restarting on quetiapine and trazodone based on evaluation once he arrives on unit  - If agitated and withdrawing, would prefer benzos to manage agitation, but otherwise can use Haldol/Ativan. Will need EKG for baseline QTc      Reviewed by Dr. Weiner, agree with above. I know pt from when I admitted him to 8[facility] in 2019. Was treated by Dr. Proctor for 2 weeks thereafter. TBI seems likely. Impoverished. Reporting he sees shadows. Was stabilized on seroquel last time. Secondary gain seems a possibility. Team reached his mother earlier for collateral info.

## 2022-01-05 NOTE — ED ADULT NURSE NOTE - HPI (INCLUDE ILLNESS QUALITY, SEVERITY, DURATION, TIMING, CONTEXT, MODIFYING FACTORS, ASSOCIATED SIGNS AND SYMPTOMS)
Pt presents to ED c/o auditory hallucinations x 1 week. States "I am hearing voices telling me to kill myself by jumping in front of a train".

## 2022-01-05 NOTE — ED PROVIDER NOTE - OBJECTIVE STATEMENT
61M PMH hypothyroidism, HCV, schizophrenia/schizoaffective disorder, polysubstance abuse p/w SI. Pt states that he is hearing voices telling him to kill himself x1w. States he is thinking about wanting to jump in front of a train. Pt in ED 12/12/21 w/ similar presentation. etoh 122, cocaine+, AG 18, hgb 12.8, other labs grossly wnl. Evaluated by psych and eventually dc'd after long observation in ED. Pt states he is supposed to be on seroquel and trazadone but ran out 3d ago. Pt denies any recent actual attempt at self harm. No other systemic symptoms. States he last used etoh/cocaine yesterday.   Denies HA, SOB, CP, rhinorrhea, cough, sore throat, vision changes, focal weakness/numbness, abd pain, urinary complaints, f/c. Denies HI, other toxic ingestions.

## 2022-01-05 NOTE — ED BEHAVIORAL HEALTH ASSESSMENT NOTE - HPI (INCLUDE ILLNESS QUALITY, SEVERITY, DURATION, TIMING, CONTEXT, MODIFYING FACTORS, ASSOCIATED SIGNS AND SYMPTOMS)
Patient is a 61-year-old male, street homeless, , history of alcohol use d/o, other substance use (cocaine, cannabis), ?psychotic disorder, frequent visits to emergency room with substance-induced complaint who presented himself to the ED with complaint of command AH and SI with intent and plan.     Patient related that he started hearing auditory hallucinations 1 week ago which have since worsened. He says, "the voices tell me to go jump in front of a train." He was unable to articulate reasons for his current presentation and why he came today and not before. He denied visiting other ED's recently, brought himself in to the hospital and requested that team "admit me because I'm suicidal." He endorsed daily alcohol, cocaine, and cannabis usage. He states that he is street homeless, occasionally stays with mother in [place]. He [facility] but cannot name psychiatrist and states last on seroquel 300mg qhs and trazodone 100mg up to month ago. He states that he has had low mood, insomnia, low energy for over 1 week. He states poor concentration. He did not endorse any VH.    Called mother at [phone] danie Dorantes who related that patient does not live with her in [place]. Unknown last seen. She reported that she was unsure of psychiatric diagnosis and that he struggled with alcohol use. No known suicide attempts in the past. Hospitalized multiple times in the past.    [facility] ED team confirmed that patient came with wristband from another hospital. [facility] confirmed that patient was seen yesterday on 1/4/22 for similar complaint, possibly intoxicated at that time, reported suicidality and hearing CAH, and discharged the same day as he was determined not to require inpatient admission.    FHX: alcohol use d/o both parents

## 2022-01-05 NOTE — BH PATIENT PROFILE - PRIMARY ROLES/RESPONSIBILITIES
Dr Corrales  #: [phone]          Patient called in requesting a call back  She is scheduled to get the covid vaccine on 1/22/21 and she is having sx with the dr 2/10  She would like to know if she should wait after sx or can she get the 2nd series of shots the same of week of sx? Which is 2/12         Please advise, none

## 2022-01-05 NOTE — ED PROVIDER NOTE - CLINICAL SUMMARY MEDICAL DECISION MAKING FREE TEXT BOX
61M PMH hypothyroidism, HCV, schizophrenia/schizoaffective disorder, polysubstance abuse p/w SI. Pt states that he is hearing voices telling him to kill himself x1w. States he is thinking about wanting to jump in front of a train. Pt in ED 12/12/21 w/ similar presentation. etoh 122, cocaine+, AG 18, hgb 12.8, other labs grossly wnl. Evaluated by psych and eventually dc'd after long observation in ED. Pt states he is supposed to be on seroquel and trazadone but ran out 3d ago. Pt denies any recent actual attempt at self harm. No other systemic symptoms. States he last used etoh/cocaine yesterday. Vitals wnl, exam as above.  ddx: Likely related to chronic psych issues/polysubstance abuse. Clinically no specific toxidrome or withdrawal syndrome.   Clearance labs, psych. Will reassess.

## 2022-01-05 NOTE — ED ADULT NURSE NOTE - OBJECTIVE STATEMENT
Pt. w/ c/o auditory hallucinations x 1 week. Reports voices are telling him to kill himself by jumping in front of a train. Denies ETOH/drug use. Calm and cooperative in ED. Maintained on 1:1 observation by ED staff. Vital signs stable. Will continue to monitor.

## 2022-01-05 NOTE — ED PROVIDER NOTE - PHYSICAL EXAMINATION
has hospital band on from 1/4/22 - pt states he is unsure which hospital or why he was there  L hand, posterior aspect, superficial abrasions starting to scab - pt states this is from a fall a "few" days ago.   No swelling, normal cap refill, no bony ttp. FROM all fingers/wrist. Sensation intact. Normal adduction/abduction. Normal finger opposition. Strength 5/5. No crepitus, firmness, induration, fluctuance. Skin is normal temp. No erythema/warmth.   +repetitive teeth grinding   No clonus, rigidity, tremors, fasciculations. PERRL, EOMI, no nystagmus. Strength 5/5. Steady unassisted gait. Normal bowel sounds, skin temp/color.

## 2022-01-05 NOTE — ED ADULT NURSE NOTE - SUICIDE RISK FACTORS
Command hallucinations/Psychotic disorder current/past/Recent onset of current/past psychiatric diagnosis

## 2022-01-05 NOTE — ED BEHAVIORAL HEALTH ASSESSMENT NOTE - RISK ASSESSMENT
He does carry multiple risk factors for suicide including his sex, age, substance use, , poor frustration tolerance. Protectively, is resilient, help-seeking. Affect did not reflect mood, organized presentation, few signs or symptoms of psychosis, and history of multiple presentations to ED for secondary gain give credence to suspicion that admission would not benefit patient. Nevertheless, given his statements about active SI in the context of substance intoxication vs withdrawal would recommend giving Seroquel 50mg and to reassess this evening. Moderate Acute Suicide Risk

## 2022-01-05 NOTE — ED BEHAVIORAL HEALTH ASSESSMENT NOTE - DESCRIPTION
currently street homeless, reports receiving SSI Patient BIB self and ED consulted psychiatry to evaluate suicidality. alcohol use d/o

## 2022-01-05 NOTE — BH PATIENT PROFILE - FALL HARM RISK - UNIVERSAL INTERVENTIONS
Bed in lowest position, wheels locked, appropriate side rails in place/Call bell, personal items and telephone in reach/Instruct patient to call for assistance before getting out of bed or chair/Non-slip footwear when patient is out of bed/[place] to call system/Physically safe environment - no spills, clutter or unnecessary equipment/Purposeful Proactive Rounding/Room/bathroom lighting operational, light cord in reach

## 2022-01-05 NOTE — ED BEHAVIORAL HEALTH ASSESSMENT NOTE - DETAILS
nausea and stomach distress to jump in front contacted ED team seen at [facility] reports FHx of alcohol use d/o yes prompted by command auditory hallucinations

## 2022-01-05 NOTE — ED PROVIDER NOTE - PROGRESS NOTE DETAILS
[place]: labs grossly at baseline. Medically stable. Psych consulted. UA no infection, tox is + for cocaine, THC, benzos. [place]: evaluated by psych, requesting seroquel/reassessment. [place]: rediscussed w/ psych, will admit for further care.

## 2022-01-05 NOTE — ED BEHAVIORAL HEALTH ASSESSMENT NOTE - CASE SUMMARY
60yo M, single, undomiciled, [place], with a reported PPH of schizophrenia/schizoaffective disorder, alcohol and cocaine use disorders, ?substance-induced psychosis, at least one self-reported past suicide attempt by overdose, several psychiatric hospitalizations (last known to [facility] 8/9/21-9/1/21 per prior chart), frequent ED visits to [facility], [facility], no known/reported alcohol withdrawal seizures/DTs, hx incarceration (per [facility] records), who self-presented to the ED with c/o with SI, CAH to jump in front of a train in the setting of daily alcohol and crack cocaine use, last use yesterday, and recent reported discontinuation of prescribed Seroquel and trazodone. Utox +cocaine, BZDs and THC. Pt presents as simple, concrete, constricted in affect, perseverative on auditory hallucinations telling him to jump in front of a train though not appearing to respond to internal stimuli, requesting admission. No reported recent suicide attempts. Pt with difficulty identifying any protective factors or engaging in safety planning, though [facility] and emotional connection to his mother. Pt reports similar sx for the last week and is unable to identify an acute stressor leading to ED presentation today, denies any other recent hospital care though wearing a hospital wristband from another institution dated yesterday (no name but confirmed with [facility] that seen for similar sx yesterday and discharged). Given recent substance use, medication nonadherence, and many similar prior presentations with similar sx resolving quickly and not requiring psychiatric hospitalization, recommend offering dose of Seroquel and holding for reassessment for suspected substance-induced mood/psychotic d/o, r/o malingering for shelter. Continue 1:1 for safety for now given elevated risk of harm to self. D/w ED attending.

## 2022-01-05 NOTE — BH PATIENT PROFILE - HOME MEDICATIONS
SEROquel 50 mg oral tablet , 1 tab(s) orally once a day   traZODone 150 mg oral tablet , 1 tab(s) orally once a day (at bedtime), As needed, insomnia  QUEtiapine 200 mg oral tablet , 3 tab(s) orally once a day (at bedtime)   Nicorette 2 mg oral transmucosal gum , 2 gum chewed every 2 hours   Multiple Vitamins oral tablet , 1 tab(s) orally once a day  gabapentin 100 mg oral capsule , 1 cap(s) orally every 8 hours, As needed, anxiety

## 2022-01-05 NOTE — ED ADULT TRIAGE NOTE - CHIEF COMPLAINT QUOTE
Pt presents to ED c/o auditory hallucinations x 1 week. States "I am hearing voices telling me to kill myself by jumping in front of a train". Denies ETOH/drug use. Denies HI. Constant observation initiated in triage. Pt changed into gown, belongings secured.

## 2022-01-06 PROCEDURE — 99232 SBSQ HOSP IP/OBS MODERATE 35: CPT

## 2022-01-06 RX ADMIN — Medication 100 MILLIGRAM(S): at 21:53

## 2022-01-06 RX ADMIN — QUETIAPINE FUMARATE 50 MILLIGRAM(S): 200 TABLET, FILM COATED ORAL at 21:59

## 2022-01-06 NOTE — BH INPATIENT PSYCHIATRY ASSESSMENT NOTE - NSBHASSESSSUMMFT_PSY_ALL_CORE
Patient related that he started hearing auditory hallucinations 1 week ago which have since worsened. He says, "the voices tell me to go jump in front of a train." He was unable to articulate reasons for his current presentation and why he came today and not before. He denied visiting other ED's recently, brought himself in to the hospital and requested that team "admit me because I'm suicidal." He endorsed daily alcohol, cocaine, and cannabis usage. He states that he is street homeless, occasionally stays with mother in [place]. He reports having [facility] services but cannot name psychiatrist and states last on seroquel 300mg qhs and trazodone 100mg up to month ago. He states that he has had low mood, insomnia, low energy for over 1 week. He states poor concentration. He did not endorse any VH.    Called mother at [phone] danie Dorantes who related that patient does not live with her in [place]. Unknown last seen. She reported that she was unsure of psychiatric diagnosis and that he struggled with alcohol use. No known suicide attempts in the past. Hospitalized multiple times in the past.    ;;01/06: in bed isolative with command AH to harm self but no intent ;plan to jump in train not a threat on unit. ; wants Seroquel and Trazodone; discussed raising dose to 600-700mg range at night as in prior admissions; need to clarify COVID vacination hx.

## 2022-01-06 NOTE — BH INPATIENT PSYCHIATRY ASSESSMENT NOTE - DETAILS
reports FHx of alcohol use d/o seen at [facility] prompted by command auditory hallucinations

## 2022-01-06 NOTE — BH INPATIENT PSYCHIATRY ASSESSMENT NOTE - NSBHMETABOLIC_PSY_ALL_CORE_FT
BMI: BMI (kg/m2): 26.5 (01-05-22 @ 20:20)  HbA1c:   Glucose: POCT Blood Glucose.: 117 mg/dL (12-11-21 @ 22:28)    BP: 109/71 (01-06-22 @ 10:42) (109/71 - 152/82)  Lipid Panel:

## 2022-01-06 NOTE — BH SOCIAL WORK INITIAL PSYCHOSOCIAL EVALUATION - OTHER PAST PSYCHIATRIC HISTORY (INCLUDE DETAILS REGARDING ONSET, COURSE OF ILLNESS, INPATIENT/OUTPATIENT TREATMENT)
Patient refused to engage in interview. Per chart review the patient has multiple past presentations to emergency rooms, at least 2 prior IP hospitalizations with complaints of A/H and substance abuse.

## 2022-01-06 NOTE — BH INPATIENT PSYCHIATRY ASSESSMENT NOTE - NS ED BHA REVIEW OF ED CHART VITAL SIGNS REVIEWED
Chart reviewed.  Patient has failed multiple other medications for management of chronic insomnia..    PDMP reviewed; no aberrant behavior identified, prescription authorized.     Yes

## 2022-01-06 NOTE — BH INPATIENT PSYCHIATRY ASSESSMENT NOTE - REASONS: WHAT REASONS DID YOU HAVE FOR THINKING ABOUT WANTING TO DIE OR KILLING YOURSELF? WAS IT TO END THE PAIN, STOP THE WAY YOU WERE FEELING (BECAUSE YOU COULDN’T STAND THE WAY YOU FELT), GET ATTENTION, REVENGE OR A REACTION FROM OTHERS? OR BOTH?
(5) Completely to end or stop the pain (you couldn't go on living with the pain or how you were feeling)

## 2022-01-06 NOTE — CHART NOTE - NSCHARTNOTEFT_GEN_A_CORE
[facility]  PHYSICAL EXAM: Agree/Declined    VITALS: T(C): 36.4 (01-05-22 @ 20:20), Max: 36.8 (01-05-22 @ 14:20)  HR: 86 (01-05-22 @ 20:20) (80 - 86)  BP: 142/89 (01-05-22 @ 20:20) (127/85 - 152/82)  RR: 18 (01-05-22 @ 20:20) (18 - 18)  SpO2: 99% (01-05-22 @ 20:20) (98% - 100%)      GENERAL: NAD, comfortable, ambulating  HEAD:  Atraumatic, Normocephalic  EYES: EOMI, PERRLA, conjunctiva and sclera clear  ENT: Moist mucous membranes  NECK: Supple, No JVD  CHEST/LUNG: Clear to auscultation bilaterally; No rales, rhonchi, wheezing, or rubs. Unlabored respirations  HEART: Regular rate and rhythm; No murmurs, rubs, or gallops  ABDOMEN: BSx4; Soft, nontender, nondistended  EXTREMITIES:  No clubbing, cyanosis, or edema  NERVOUS SYSTEM:  A&Ox3, no focal deficits   SKIN: No rashes or lesions [facility]  PHYSICAL EXAM: Agreed      VITALS: T(C): 36.4 (01-05-22 @ 20:20), Max: 36.8 (01-05-22 @ 14:20)  HR: 86 (01-05-22 @ 20:20) (80 - 86)  BP: 142/89 (01-05-22 @ 20:20) (127/85 - 152/82)  RR: 18 (01-05-22 @ 20:20) (18 - 18)  SpO2: 99% (01-05-22 @ 20:20) (98% - 100%)      GENERAL: NAD, comfortable, ambulating  HEAD:  Atraumatic, Normocephalic  EYES: EOMI, PERRLA, conjunctiva and sclera clear  ENT: Moist mucous membranes, poor dentition  NECK: Supple, No JVD  CHEST/LUNG: Clear to auscultation bilaterally; No rales, rhonchi, wheezing, or rubs. Unlabored respirations  HEART: Regular rate and rhythm; No murmurs, rubs, or gallops. S1 and S2 heard  ABDOMEN: BSx4; Soft, nontender, nondistended  EXTREMITIES:  No clubbing, cyanosis, or edema  NERVOUS SYSTEM:  A&Ox3, no focal deficits   SKIN: No rashes or lesions [facility]  PHYSICAL EXAM: Agreed      VITALS: T(C): 36.4 (01-05-22 @ 20:20), Max: 36.8 (01-05-22 @ 14:20)  HR: 86 (01-05-22 @ 20:20) (80 - 86)  BP: 142/89 (01-05-22 @ 20:20) (127/85 - 152/82)  RR: 18 (01-05-22 @ 20:20) (18 - 18)  SpO2: 99% (01-05-22 @ 20:20) (98% - 100%)      GENERAL: NAD, comfortable, ambulating  HEAD:  Atraumatic, Normocephalic  EYES: EOMI, PERRLA, conjunctiva and sclera clear  ENT: Moist mucous membranes, poor dentition  NECK: Supple, No JVD  CHEST/LUNG: Clear to auscultation bilaterally; No rales, rhonchi, wheezing, or rubs. Unlabored respirations  HEART: Regular rate and rhythm; No murmurs, rubs, or gallops. S1 and S2 heard  ABDOMEN: BSx4; Soft, nontender, nondistended  EXTREMITIES:  No clubbing, cyanosis, or edema  NERVOUS SYSTEM:  A&Ox3, no focal deficits   SKIN: No rashes or lesions    No current signs of withdrawal.

## 2022-01-06 NOTE — BH INPATIENT PSYCHIATRY ASSESSMENT NOTE - NSTXCOPEDATETRGT_PSY_ALL_CORE
Patient Education     Acute Otitis Media with Infection (Child)    Your child has a middle ear infection (acute otitis media). It is caused by bacteria or fungi. The middle ear is the space behind the eardrum. The eustachian tube connects the ear to the nasal passage. The eustachian tubes help drain fluid from the ears. They also keep the air pressure equal inside and outside the ears. These tubes are shorter and more horizontal in children. This makes it more likely for the tubes to become blocked. A blockage lets fluid and pressure build up in the middle ear. Bacteria or fungi can grow in this fluid and cause an ear infection. This infection is commonly known as an earache.  The main symptom of an ear infection is ear pain. Other symptoms may include pulling at the ear, being more fussy than usual, decreased appetite, and vomiting or diarrhea. Your child s hearing may also be affected. Your child may have had a respiratory infection first.  An ear infection may clear up on its own. Or your child may need to take medicine. After the infection goes away, your child may still have fluid in the middle ear. It may take weeks or months for this fluid to go away. During that time, your child may have temporary hearing loss. But all other symptoms of the earache should be gone.  Home care  Follow these guidelines when caring for your child at home:    The healthcare provider will likely prescribe medicines for pain. The provider may also prescribe antibiotics or antifungals to treat the infection. These may be liquid medicines to give by mouth. Or they may be ear drops. Follow the provider s instructions for giving these medicines to your child.    Because ear infections can clear up on their own, the provider may suggest waiting for a few days before giving your child medicines for infection.    To reduce pain, have your child rest in an upright position. Hot or cold compresses held against the ear may help ease  pain.    Keep the ear dry. Have your child wear a shower cap when bathing.  To help prevent future infections:    Don't smoke near your child. Secondhand smoke raises the risk for ear infections in children.    Make sure your child gets all appropriate vaccines.    Do not bottle-feed while your baby is lying on his or her back. (This position can cause middle ear infections because it allows milk to run into the eustachian tubes.)        If you breastfeed, continue until your child is 6 to 12 months of age.  Date Last Reviewed: 2017    1194-6395 [employer] [address] [place] [id number]. All rights reserved. This information is not intended as a substitute for professional medical care. Always follow your healthcare professional's instructions.            13-Jan-2022

## 2022-01-06 NOTE — BH INPATIENT PSYCHIATRY ASSESSMENT NOTE - CURRENT MEDICATION
MEDICATIONS  (STANDING):  folic acid 1 milliGRAM(s) Oral daily  multivitamin 1 Tablet(s) Oral daily  QUEtiapine 50 milliGRAM(s) Oral every 12 hours  thiamine 100 milliGRAM(s) Oral daily  traZODone 100 milliGRAM(s) Oral at bedtime    MEDICATIONS  (PRN):  acetaminophen     Tablet .. 650 milliGRAM(s) Oral every 6 hours PRN Moderate Pain (4 - 6)  aluminum hydroxide/magnesium hydroxide/simethicone Suspension 30 milliLiter(s) Oral every 4 hours PRN Dyspepsia  haloperidol     Tablet 5 milliGRAM(s) Oral every 6 hours PRN agitation  LORazepam     Tablet 2 milliGRAM(s) Oral every 6 hours PRN Agitation  LORazepam     Tablet 2 milliGRAM(s) Oral every 2 hours PRN CIWA-Ar score increase by 2 points and a total score of 7 or less  magnesium hydroxide Suspension 30 milliLiter(s) Oral daily PRN Constipation  nicotine  Polacrilex Gum 2 milliGRAM(s) Oral every 2 hours PRN cravings

## 2022-01-06 NOTE — BH INPATIENT PSYCHIATRY ASSESSMENT NOTE - VIOLENCE RISK FACTORS:
Substance abuse/Affective dysregulation/Impulsivity/Command hallucinations for violent behavior/Noncompliance with treatment/None Known

## 2022-01-06 NOTE — BH INPATIENT PSYCHIATRY ASSESSMENT NOTE - HPI (INCLUDE ILLNESS QUALITY, SEVERITY, DURATION, TIMING, CONTEXT, MODIFYING FACTORS, ASSOCIATED SIGNS AND SYMPTOMS)
Patient is a 61-year-old male, street homeless, , history of alcohol use d/o, other substance use (cocaine, cannabis), ?psychotic disorder, frequent visits to emergency room with substance-induced complaint who presented himself to the ED with complaint of command AH and SI with intent and plan.     Patient related that he started hearing auditory hallucinations 1 week ago which have since worsened. He says, "the voices tell me to go jump in front of a train." He was unable to articulate reasons for his current presentation and why he came today and not before. He denied visiting other ED's recently, brought himself in to the hospital and requested that team "admit me because I'm suicidal." He endorsed daily alcohol, cocaine, and cannabis usage. He states that he is street homeless, occasionally stays with mother in [place]. He reports having [facility] services but cannot name psychiatrist and states last on seroquel 300mg qhs and trazodone 100mg up to month ago. He states that he has had low mood, insomnia, low energy for over 1 week. He states poor concentration. He did not endorse any VH.    Called mother at [phone] danie Dorantes who related that patient does not live with her in [place]. Unknown last seen. She reported that she was unsure of psychiatric diagnosis and that he struggled with alcohol use. No known suicide attempts in the past. Hospitalized multiple times in the past.    [facility] ED team confirmed that patient came with wristband from another hospital. [facility] confirmed that patient was seen yesterday on 1/4/22 for similar complaint, possibly intoxicated at that time, reported suicidality and hearing CAH, and discharged the same day as he was determined not to require inpatient admission.    FHX: alcohol use d/o both parents

## 2022-01-06 NOTE — BH INPATIENT PSYCHIATRY ASSESSMENT NOTE - NSCOMMENTSUICRISKFACT_PSY_ALL_CORE
static: substances; past AH commanding harm; spotty compliance; modifable: compliance ; medications for AH; protective: accepts treatment.

## 2022-01-06 NOTE — BH INPATIENT PSYCHIATRY ASSESSMENT NOTE - NSBHCHARTREVIEWVS_PSY_A_CORE FT
Vital Signs Last 24 Hrs  T(C): 37.3 (01-06-22 @ 10:42), Max: 37.3 (01-06-22 @ 10:42)  T(F): 99.2 (01-06-22 @ 10:42), Max: 99.2 (01-06-22 @ 10:42)  HR: 78 (01-06-22 @ 10:42) (78 - 86)  BP: 109/71 (01-06-22 @ 10:42) (109/71 - 142/89)  BP(mean): --  RR: 17 (01-06-22 @ 10:42) (17 - 18)  SpO2: 97% (01-06-22 @ 10:42) (97% - 100%)

## 2022-01-06 NOTE — BH INPATIENT PSYCHIATRY ASSESSMENT NOTE - MSE UNSTRUCTURED FT
Fund of knowledge intact; registers 3/3 and recalls 2/3 with prompting ;  oriented x 3; recalls past events; alert;oriented x3; Isolative; stays in bed; makes minimal eye contact; not conversational.  Thinking is congruent with affect; no pecularities of thinking or language use but guarded and evasive.  i&J: poor: limited if any awareness of medications; guarded or minimally acknowledging sxs; not reflective on issues that impact on symptoms  sad and constricted; limited eye contact.

## 2022-01-06 NOTE — BH INPATIENT PSYCHIATRY ASSESSMENT NOTE - RISK ASSESSMENT
He does carry multiple risk factors for suicide including his sex, age, substance use, , poor frustration tolerance. Protectively, is resilient, help-seeking. Affect did not reflect mood, organized presentation, few signs or symptoms of psychosis, and history of multiple presentations to ED for secondary gain give credence to suspicion that admission would not benefit patient. Nevertheless, given his statements about active SI in the context of substance intoxication vs withdrawal would recommend giving Seroquel 50mg and to reassess this evening.

## 2022-01-06 NOTE — BH INPATIENT PSYCHIATRY ASSESSMENT NOTE - NSDCCRITERIA_PSY_ALL_CORE
no SI minimal AH not prominent or disturbing patient; not isolated; social with peers; No behavioral issues.

## 2022-01-07 DIAGNOSIS — F19.251: ICD-10-CM

## 2022-01-07 PROCEDURE — 99233 SBSQ HOSP IP/OBS HIGH 50: CPT

## 2022-01-07 RX ORDER — QUETIAPINE FUMARATE 200 MG/1
200 TABLET, FILM COATED ORAL AT BEDTIME
Refills: 0 | Status: DISCONTINUED | OUTPATIENT
Start: 2022-01-07 | End: 2022-01-09

## 2022-01-07 RX ORDER — TRAZODONE HCL 50 MG
100 TABLET ORAL AT BEDTIME
Refills: 0 | Status: DISCONTINUED | OUTPATIENT
Start: 2022-01-07 | End: 2022-01-19

## 2022-01-07 RX ADMIN — QUETIAPINE FUMARATE 200 MILLIGRAM(S): 200 TABLET, FILM COATED ORAL at 20:26

## 2022-01-07 RX ADMIN — Medication 100 MILLIGRAM(S): at 10:21

## 2022-01-07 RX ADMIN — Medication 1 TABLET(S): at 10:21

## 2022-01-07 RX ADMIN — Medication 2 MILLIGRAM(S): at 14:12

## 2022-01-07 RX ADMIN — Medication 1 MILLIGRAM(S): at 10:21

## 2022-01-07 NOTE — BH INPATIENT PSYCHIATRY PROGRESS NOTE - NSBHMETABOLIC_PSY_ALL_CORE_FT
BMI: BMI (kg/m2): 26.5 (01-05-22 @ 20:20)  HbA1c:   Glucose: POCT Blood Glucose.: 117 mg/dL (12-11-21 @ 22:28)    BP: 115/75 (01-07-22 @ 10:10) (95/58 - 152/82)  Lipid Panel:  BMI: BMI (kg/m2): 26.5 (01-05-22 @ 20:20)  HbA1c:   Glucose: POCT Blood Glucose.: 117 mg/dL (12-11-21 @ 22:28)    BP: 135/83 (01-08-22 @ 17:29) (95/58 - 135/83)  Lipid Panel:

## 2022-01-07 NOTE — PSYCHIATRIC REHAB INITIAL EVALUATION - NSBHPRTOOLBOX_PSY_ALL_CORE
"being in the hospital", reading and watching the news (to be able to stay safe)/Entertainment/Exercise/Reading/Other

## 2022-01-07 NOTE — BH INPATIENT PSYCHIATRY PROGRESS NOTE - NSBHCHARTREVIEWVS_PSY_A_CORE FT
Vital Signs Last 24 Hrs  T(C): 36.7 (01-07-22 @ 10:10), Max: 36.7 (01-07-22 @ 10:10)  T(F): 98.1 (01-07-22 @ 10:10), Max: 98.1 (01-07-22 @ 10:10)  HR: 84 (01-07-22 @ 10:10) (84 - 88)  BP: 115/75 (01-07-22 @ 10:10) (95/58 - 115/75)  BP(mean): --  RR: 20 (01-07-22 @ 10:10) (18 - 20)  SpO2: 99% (01-07-22 @ 10:10) (96% - 99%)     Vital Signs Last 24 Hrs  T(C): 36.8 (01-08-22 @ 17:29), Max: 36.9 (01-08-22 @ 10:50)  T(F): 98.2 (01-08-22 @ 17:29), Max: 98.4 (01-08-22 @ 10:50)  HR: 65 (01-08-22 @ 17:29) (65 - 86)  BP: 135/83 (01-08-22 @ 17:29) (135/74 - 135/83)  BP(mean): --  RR: 16 (01-08-22 @ 17:29) (16 - 16)  SpO2: 100% (01-08-22 @ 17:29) (98% - 100%)

## 2022-01-07 NOTE — PSYCHIATRIC REHAB INITIAL EVALUATION - NSBHSUPNAMERELATIVE_PSY_ALL_CORE
Mother in 80s, reports she lives in an apartment in [place] but doesn't know the address, endorses speaking with her daily

## 2022-01-07 NOTE — PSYCHIATRIC REHAB INITIAL EVALUATION - NSPROEDAABILITYLEARN_GEN_A_NUR
anxiety
Constitutional: NAD, well appearing  HEENT: no rhinorrhea, PERRL, no oropharyngeal erythema or exudates, midline uvula.  TMs clear.  CVS:  RRR, no m/r/g; radial pulse on R 2+  Resp:  CTAB  GI: soft, ntnd  MSK:  pain with abduction of shoulder  Neuro: NVI to RUE.    Skin: no rash  psych: clear thought content  Heme/lymph:  No LAD

## 2022-01-07 NOTE — BH INPATIENT PSYCHIATRY PROGRESS NOTE - CURRENT MEDICATION
MEDICATIONS  (STANDING):  folic acid 1 milliGRAM(s) Oral daily  multivitamin 1 Tablet(s) Oral daily  QUEtiapine 200 milliGRAM(s) Oral at bedtime  thiamine 100 milliGRAM(s) Oral daily    MEDICATIONS  (PRN):  acetaminophen     Tablet .. 650 milliGRAM(s) Oral every 6 hours PRN Moderate Pain (4 - 6)  aluminum hydroxide/magnesium hydroxide/simethicone Suspension 30 milliLiter(s) Oral every 4 hours PRN Dyspepsia  haloperidol     Tablet 5 milliGRAM(s) Oral every 6 hours PRN agitation  LORazepam     Tablet 2 milliGRAM(s) Oral every 6 hours PRN Agitation  LORazepam     Tablet 2 milliGRAM(s) Oral every 2 hours PRN CIWA-Ar score increase by 2 points and a total score of 7 or less  magnesium hydroxide Suspension 30 milliLiter(s) Oral daily PRN Constipation  nicotine  Polacrilex Gum 2 milliGRAM(s) Oral every 2 hours PRN cravings  traZODone 100 milliGRAM(s) Oral at bedtime PRN insomnia   MEDICATIONS  (STANDING):  folic acid 1 milliGRAM(s) Oral daily  multivitamin 1 Tablet(s) Oral daily  QUEtiapine 200 milliGRAM(s) Oral at bedtime    MEDICATIONS  (PRN):  acetaminophen     Tablet .. 650 milliGRAM(s) Oral every 6 hours PRN Moderate Pain (4 - 6)  aluminum hydroxide/magnesium hydroxide/simethicone Suspension 30 milliLiter(s) Oral every 4 hours PRN Dyspepsia  haloperidol     Tablet 5 milliGRAM(s) Oral every 6 hours PRN agitation  LORazepam     Tablet 2 milliGRAM(s) Oral every 6 hours PRN Agitation  LORazepam     Tablet 2 milliGRAM(s) Oral every 2 hours PRN CIWA-Ar score increase by 2 points and a total score of 7 or less  magnesium hydroxide Suspension 30 milliLiter(s) Oral daily PRN Constipation  nicotine  Polacrilex Gum 2 milliGRAM(s) Oral every 2 hours PRN cravings  traZODone 100 milliGRAM(s) Oral at bedtime PRN insomnia

## 2022-01-07 NOTE — PSYCHIATRIC REHAB INITIAL EVALUATION - NSBHEMPSTRENGTHS2FT_PSY_ALL_CORE
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

## 2022-01-07 NOTE — BH INPATIENT PSYCHIATRY PROGRESS NOTE - PRN MEDS
MEDICATIONS  (PRN):  acetaminophen     Tablet .. 650 milliGRAM(s) Oral every 6 hours PRN Moderate Pain (4 - 6)  aluminum hydroxide/magnesium hydroxide/simethicone Suspension 30 milliLiter(s) Oral every 4 hours PRN Dyspepsia  haloperidol     Tablet 5 milliGRAM(s) Oral every 6 hours PRN agitation  LORazepam     Tablet 2 milliGRAM(s) Oral every 6 hours PRN Agitation  LORazepam     Tablet 2 milliGRAM(s) Oral every 2 hours PRN CIWA-Ar score increase by 2 points and a total score of 7 or less  magnesium hydroxide Suspension 30 milliLiter(s) Oral daily PRN Constipation  nicotine  Polacrilex Gum 2 milliGRAM(s) Oral every 2 hours PRN cravings  traZODone 100 milliGRAM(s) Oral at bedtime PRN insomnia

## 2022-01-07 NOTE — BH INPATIENT PSYCHIATRY PROGRESS NOTE - NSBHFUPINTERVALHXFT_PSY_A_CORE
No events overnight, mixed adherence to medications, did take quetiapine 50 mg last night but denied taking this when asked today. Upon evaluation today, his affect remains unchanged compared to when writer saw patient two nights ago. He also continues to demonstrate significant bruxism. The patient stated that the voice in his head has gotten worse, occurs more frequently throughout the day, and is telling him to overdose on pills or to jump in front of a train. He stated he does currently have plan and intent to jump in front of a train, and would do so were he to be discharged. Also continues to see shadows. He stated that he had little to no sleep last night but denied issues with appetite and using the restroom. Continued to report mood as depressed. Curiously, he stated that he had only been drinking the past year and had not drank alcohol at all prior to that. Also said that he was 64 years old and also did not know which hospital he was in.

## 2022-01-07 NOTE — BH INPATIENT PSYCHIATRY PROGRESS NOTE - NSBHASSESSSUMMFT_PSY_ALL_CORE
1/7: Despite taking a few doses of quetiapine and having increased temporal distance from last use of substances, the patient reported having worsening of his CAH and continues to endorse active suicidal ideation with plan and intent to jump in front of a train. He also complains of depressed mood and insomnia. Reported having continued withdrawal symptoms though vitals do not show any autonomic instability, he does not appear tremulous on exam, and maintains attention though was not oriented to place. Affect remains constricted with some anxiety. Writer does have some suspicion for some cognitive impairment given his lack of orientation to place, statement of his age being 64, and denying any alcohol use prior to this past year. Will conduct MOCA/MMSE to evaluate cognitive status. Will change quetiapine to 200 mg at night given his inconsistent adherence. Trazodone will be made prn at 100 mg at night for insomnia. Remains unsafe for discharge given continued suicidality.  1/7: Despite taking a few doses of quetiapine and having increased temporal distance from last use of substances, the patient reported having worsening of his CAH and continues to endorse active suicidal ideation with plan and intent to jump in front of a train. He also complains of depressed mood and insomnia. Reported having continued withdrawal symptoms though vitals do not show any autonomic instability, he does not appear tremulous on exam, and maintains attention though was not oriented to place. Affect remains constricted with some anxiety. Writer does have some suspicion for some cognitive impairment given his lack of orientation to place, statement of his age being 64, and denying any alcohol use prior to this past year. Interestingly, he did not remember that writer is one of his doctors. No nystagmus or ataxic gait on exam, did not appear to have any confabulation. MOCA results described below. However, even if there is an active neurocognitive issue, he has shown himself capable of navigating the health care system and did not present as either cachectic or particularly disheveled when he came to the hospital, indicating ability to care for self. Will change quetiapine to 200 mg at night given his inconsistent adherence. Trazodone will be made prn at 100 mg at night for insomnia. Remains unsafe for discharge given continued suicidality.     MOCA was conducted, with score of 16/30 (extra point given due to highest level of ed being 10th grade). He was unable to complete trail, copy cube, name rhino or camel. On registration he consistently could not remember first 2 items on both trials. He was unable to state digits in forward order, only remembered first, 4th and 5th digits. He could not maintain attention through list of letters, could not subtract 7's more than once, not able to name more than 1 word w/ F, did not have similarity of watch and ruler. He got none of the words for delayed recall, and only face and red from mult choice cues. He was fully oriented. His effort was poor overall, so it is unclear if this is a fully valid assessment.

## 2022-01-07 NOTE — BH INPATIENT PSYCHIATRY PROGRESS NOTE - OTHER
somewhat slurred speech Significant bruxism Oddly related Slight imbalance to gait but otherwise not ataxic Voice telling him to overdose on pills or jump in front of a train Mild poverty, more focused on demonstrating his need for hospitalization [place] thinking but linear

## 2022-01-08 PROCEDURE — 99231 SBSQ HOSP IP/OBS SF/LOW 25: CPT

## 2022-01-08 RX ADMIN — Medication 100 MILLIGRAM(S): at 21:38

## 2022-01-08 RX ADMIN — QUETIAPINE FUMARATE 200 MILLIGRAM(S): 200 TABLET, FILM COATED ORAL at 21:38

## 2022-01-08 RX ADMIN — Medication 1 MILLIGRAM(S): at 09:49

## 2022-01-08 RX ADMIN — Medication 100 MILLIGRAM(S): at 09:49

## 2022-01-08 RX ADMIN — Medication 2 MILLIGRAM(S): at 09:51

## 2022-01-08 RX ADMIN — Medication 1 TABLET(S): at 09:49

## 2022-01-08 RX ADMIN — Medication 2 MILLIGRAM(S): at 21:50

## 2022-01-08 NOTE — BH INPATIENT PSYCHIATRY PROGRESS NOTE - NSBHFUPINTERVALHXFT_PSY_A_CORE
Pt very withdrawn and in bed. Minimal interaction. Denies side effects. Continues to report vague SI.

## 2022-01-08 NOTE — BH INPATIENT PSYCHIATRY PROGRESS NOTE - CURRENT MEDICATION
MEDICATIONS  (STANDING):  folic acid 1 milliGRAM(s) Oral daily  multivitamin 1 Tablet(s) Oral daily  QUEtiapine 200 milliGRAM(s) Oral at bedtime    MEDICATIONS  (PRN):  acetaminophen     Tablet .. 650 milliGRAM(s) Oral every 6 hours PRN Moderate Pain (4 - 6)  aluminum hydroxide/magnesium hydroxide/simethicone Suspension 30 milliLiter(s) Oral every 4 hours PRN Dyspepsia  haloperidol     Tablet 5 milliGRAM(s) Oral every 6 hours PRN agitation  LORazepam     Tablet 2 milliGRAM(s) Oral every 6 hours PRN Agitation  LORazepam     Tablet 2 milliGRAM(s) Oral every 2 hours PRN CIWA-Ar score increase by 2 points and a total score of 7 or less  magnesium hydroxide Suspension 30 milliLiter(s) Oral daily PRN Constipation  nicotine  Polacrilex Gum 2 milliGRAM(s) Oral every 2 hours PRN cravings  traZODone 100 milliGRAM(s) Oral at bedtime PRN insomnia

## 2022-01-08 NOTE — BH INPATIENT PSYCHIATRY PROGRESS NOTE - NSBHASSESSSUMMFT_PSY_ALL_CORE
1/7: Despite taking a few doses of quetiapine and having increased temporal distance from last use of substances, the patient reported having worsening of his CAH and continues to endorse active suicidal ideation with plan and intent to jump in front of a train. He also complains of depressed mood and insomnia. Reported having continued withdrawal symptoms though vitals do not show any autonomic instability, he does not appear tremulous on exam, and maintains attention though was not oriented to place. Affect remains constricted with some anxiety. Writer does have some suspicion for some cognitive impairment given his lack of orientation to place, statement of his age being 64, and denying any alcohol use prior to this past year. Interestingly, he did not remember that writer is one of his doctors. No nystagmus or ataxic gait on exam, did not appear to have any confabulation. MOCA results described below. However, even if there is an active neurocognitive issue, he has shown himself capable of navigating the health care system and did not present as either cachectic or particularly disheveled when he came to the hospital, indicating ability to care for self. Will change quetiapine to 200 mg at night given his inconsistent adherence. Trazodone will be made prn at 100 mg at night for insomnia. Remains unsafe for discharge given continued suicidality.     MOCA was conducted, with score of 16/30 (extra point given due to highest level of ed being 10th grade). He was unable to complete trail, copy cube, name rhino or camel. On registration he consistently could not remember first 2 items on both trials. He was unable to state digits in forward order, only remembered first, 4th and 5th digits. He could not maintain attention through list of letters, could not subtract 7's more than once, not able to name more than 1 word w/ F, did not have similarity of watch and ruler. He got none of the words for delayed recall, and only face and red from mult choice cues. He was fully oriented. His effort was poor overall, so it is unclear if this is a fully valid assessment.

## 2022-01-08 NOTE — BH INPATIENT PSYCHIATRY PROGRESS NOTE - NSBHCHARTREVIEWVS_PSY_A_CORE FT
Vital Signs Last 24 Hrs  T(C): 36.8 (01-08-22 @ 17:29), Max: 36.9 (01-08-22 @ 10:50)  T(F): 98.2 (01-08-22 @ 17:29), Max: 98.4 (01-08-22 @ 10:50)  HR: 65 (01-08-22 @ 17:29) (65 - 86)  BP: 135/83 (01-08-22 @ 17:29) (135/74 - 135/83)  BP(mean): --  RR: 16 (01-08-22 @ 17:29) (16 - 16)  SpO2: 100% (01-08-22 @ 17:29) (98% - 100%)

## 2022-01-08 NOTE — BH INPATIENT PSYCHIATRY PROGRESS NOTE - NSBHMETABOLIC_PSY_ALL_CORE_FT
BMI: BMI (kg/m2): 26.5 (01-05-22 @ 20:20)  HbA1c:   Glucose: POCT Blood Glucose.: 117 mg/dL (12-11-21 @ 22:28)    BP: 135/83 (01-08-22 @ 17:29) (95/58 - 135/83)  Lipid Panel:

## 2022-01-08 NOTE — BH INPATIENT PSYCHIATRY PROGRESS NOTE - OTHER
Significant bruxism Oddly related Slight imbalance to gait but otherwise not ataxic Voice telling him to overdose on pills or jump in front of a train [place] thinking but linear Mild poverty, more focused on demonstrating his need for hospitalization

## 2022-01-09 PROCEDURE — 99232 SBSQ HOSP IP/OBS MODERATE 35: CPT

## 2022-01-09 RX ORDER — QUETIAPINE FUMARATE 200 MG/1
300 TABLET, FILM COATED ORAL AT BEDTIME
Refills: 0 | Status: DISCONTINUED | OUTPATIENT
Start: 2022-01-09 | End: 2022-01-11

## 2022-01-09 RX ADMIN — Medication 2 MILLIGRAM(S): at 10:51

## 2022-01-09 RX ADMIN — Medication 100 MILLIGRAM(S): at 21:32

## 2022-01-09 RX ADMIN — Medication 1 TABLET(S): at 09:36

## 2022-01-09 RX ADMIN — QUETIAPINE FUMARATE 300 MILLIGRAM(S): 200 TABLET, FILM COATED ORAL at 21:32

## 2022-01-09 RX ADMIN — Medication 1 MILLIGRAM(S): at 09:35

## 2022-01-09 NOTE — BH INPATIENT PSYCHIATRY PROGRESS NOTE - CURRENT MEDICATION
MEDICATIONS  (STANDING):  folic acid 1 milliGRAM(s) Oral daily  multivitamin 1 Tablet(s) Oral daily  QUEtiapine 200 milliGRAM(s) Oral at bedtime    MEDICATIONS  (PRN):  acetaminophen     Tablet .. 650 milliGRAM(s) Oral every 6 hours PRN Moderate Pain (4 - 6)  aluminum hydroxide/magnesium hydroxide/simethicone Suspension 30 milliLiter(s) Oral every 4 hours PRN Dyspepsia  haloperidol     Tablet 5 milliGRAM(s) Oral every 6 hours PRN agitation  LORazepam     Tablet 2 milliGRAM(s) Oral every 6 hours PRN Agitation  LORazepam     Tablet 2 milliGRAM(s) Oral every 2 hours PRN CIWA-Ar score increase by 2 points and a total score of 7 or less  magnesium hydroxide Suspension 30 milliLiter(s) Oral daily PRN Constipation  nicotine  Polacrilex Gum 2 milliGRAM(s) Oral every 2 hours PRN cravings  traZODone 100 milliGRAM(s) Oral at bedtime PRN insomnia   MEDICATIONS  (STANDING):  folic acid 1 milliGRAM(s) Oral daily  multivitamin 1 Tablet(s) Oral daily  QUEtiapine 300 milliGRAM(s) Oral at bedtime    MEDICATIONS  (PRN):  acetaminophen     Tablet .. 650 milliGRAM(s) Oral every 6 hours PRN Moderate Pain (4 - 6)  aluminum hydroxide/magnesium hydroxide/simethicone Suspension 30 milliLiter(s) Oral every 4 hours PRN Dyspepsia  haloperidol     Tablet 5 milliGRAM(s) Oral every 6 hours PRN agitation  LORazepam     Tablet 2 milliGRAM(s) Oral every 6 hours PRN Agitation  LORazepam     Tablet 2 milliGRAM(s) Oral every 2 hours PRN CIWA-Ar score increase by 2 points and a total score of 7 or less  magnesium hydroxide Suspension 30 milliLiter(s) Oral daily PRN Constipation  nicotine  Polacrilex Gum 2 milliGRAM(s) Oral every 2 hours PRN cravings  traZODone 100 milliGRAM(s) Oral at bedtime PRN insomnia

## 2022-01-09 NOTE — BH INPATIENT PSYCHIATRY PROGRESS NOTE - NSBHCHARTREVIEWVS_PSY_A_CORE FT
Vital Signs Last 24 Hrs  T(C): 36.8 (01-08-22 @ 17:29), Max: 36.9 (01-08-22 @ 10:50)  T(F): 98.2 (01-08-22 @ 17:29), Max: 98.4 (01-08-22 @ 10:50)  HR: 65 (01-08-22 @ 17:29) (65 - 86)  BP: 135/83 (01-08-22 @ 17:29) (135/74 - 135/83)  BP(mean): --  RR: 16 (01-08-22 @ 17:29) (16 - 16)  SpO2: 100% (01-08-22 @ 17:29) (98% - 100%)     Vital Signs Last 24 Hrs  T(C): 36.8 (01-09-22 @ 09:38), Max: 36.8 (01-08-22 @ 17:29)  T(F): 98.2 (01-09-22 @ 09:38), Max: 98.2 (01-08-22 @ 17:29)  HR: 81 (01-09-22 @ 09:38) (65 - 81)  BP: 149/84 (01-09-22 @ 09:38) (135/83 - 149/84)  BP(mean): --  RR: 16 (01-08-22 @ 17:29) (16 - 16)  SpO2: 99% (01-09-22 @ 09:38) (99% - 100%)

## 2022-01-09 NOTE — BH INPATIENT PSYCHIATRY PROGRESS NOTE - NSBHASSESSSUMMFT_PSY_ALL_CORE
1/7: Despite taking a few doses of quetiapine and having increased temporal distance from last use of substances, the patient reported having worsening of his CAH and continues to endorse active suicidal ideation with plan and intent to jump in front of a train. He also complains of depressed mood and insomnia. Reported having continued withdrawal symptoms though vitals do not show any autonomic instability, he does not appear tremulous on exam, and maintains attention though was not oriented to place. Affect remains constricted with some anxiety. Writer does have some suspicion for some cognitive impairment given his lack of orientation to place, statement of his age being 64, and denying any alcohol use prior to this past year. Interestingly, he did not remember that writer is one of his doctors. No nystagmus or ataxic gait on exam, did not appear to have any confabulation. MOCA results described below. However, even if there is an active neurocognitive issue, he has shown himself capable of navigating the health care system and did not present as either cachectic or particularly disheveled when he came to the hospital, indicating ability to care for self. Will change quetiapine to 200 mg at night given his inconsistent adherence. Trazodone will be made prn at 100 mg at night for insomnia. Remains unsafe for discharge given continued suicidality.     MOCA was conducted, with score of 16/30 (extra point given due to highest level of ed being 10th grade). He was unable to complete trail, copy cube, name rhino or camel. On registration he consistently could not remember first 2 items on both trials. He was unable to state digits in forward order, only remembered first, 4th and 5th digits. He could not maintain attention through list of letters, could not subtract 7's more than once, not able to name more than 1 word w/ F, did not have similarity of watch and ruler. He got none of the words for delayed recall, and only face and red from mult choice cues. He was fully oriented. His effort was poor overall, so it is unclear if this is a fully valid assessment.     ;;01/09: more social than in past; still complains of AH commanding self harm.  Alert; oriented; cognition intact; speech clear; no tremor or evidence of movement impairment.  increasing Seroquel to 300mg at night (last admission here left on 600-700mg at night)

## 2022-01-09 NOTE — BH INPATIENT PSYCHIATRY PROGRESS NOTE - OTHER
Significant bruxism Slight imbalance to gait but otherwise not ataxic Mild poverty, more focused on demonstrating his need for hospitalization [place] thinking but linear Voice telling him to overdose on pills or jump in front of a train Oddly related

## 2022-01-09 NOTE — BH INPATIENT PSYCHIATRY PROGRESS NOTE - NSBHMETABOLIC_PSY_ALL_CORE_FT
BMI: BMI (kg/m2): 26.5 (01-05-22 @ 20:20)  HbA1c:   Glucose: POCT Blood Glucose.: 117 mg/dL (12-11-21 @ 22:28)    BP: 135/83 (01-08-22 @ 17:29) (95/58 - 135/83)  Lipid Panel:  BMI: BMI (kg/m2): 26.5 (01-05-22 @ 20:20)  HbA1c:   Glucose: POCT Blood Glucose.: 117 mg/dL (12-11-21 @ 22:28)    BP: 149/84 (01-09-22 @ 09:38) (95/58 - 149/84)  Lipid Panel:

## 2022-01-09 NOTE — BH INPATIENT PSYCHIATRY PROGRESS NOTE - NSBHFUPINTERVALHXFT_PSY_A_CORE
Watching TV with peers; "I am still hearing voices"  Alert; oriented; cognition intact; speech clear; no tremor or evidence of movement impairment.  anxious; guarded; evasive; Evidence for thought blocking. Stares in direction of writer.

## 2022-01-10 LAB — SARS-COV-2 RNA SPEC QL NAA+PROBE: DETECTED

## 2022-01-10 PROCEDURE — 99222 1ST HOSP IP/OBS MODERATE 55: CPT

## 2022-01-10 PROCEDURE — 99232 SBSQ HOSP IP/OBS MODERATE 35: CPT

## 2022-01-10 RX ADMIN — Medication 1 TABLET(S): at 09:54

## 2022-01-10 RX ADMIN — Medication 1 MILLIGRAM(S): at 09:53

## 2022-01-10 RX ADMIN — Medication 100 MILLIGRAM(S): at 21:46

## 2022-01-10 RX ADMIN — Medication 2 MILLIGRAM(S): at 09:55

## 2022-01-10 RX ADMIN — QUETIAPINE FUMARATE 300 MILLIGRAM(S): 200 TABLET, FILM COATED ORAL at 21:46

## 2022-01-10 NOTE — BH INPATIENT PSYCHIATRY PROGRESS NOTE - OTHER
Significant bruxism Voice telling him to overdose on pills or jump in front of a train Slight imbalance to gait but otherwise not ataxic Oddly related Mild poverty, more focused on demonstrating his need for hospitalization [place] thinking but linear

## 2022-01-10 NOTE — CONSULT NOTE ADULT - ASSESSMENT
61 year old male with htn, HCV, schizophrenia/schizoeffective disorder, polysubstance use, who presents for suicidal ideation. Medicine consulted for htn    #HTN   SBP's have ranged from 120-150's with sbp of 129 this morning. No HA or changes in vision  - Recommend starting amlodipine 5mg if patient has two concurrent readings of SBP >140, otherwise continue to monitor.   - Will follow-up on BP in 1 week if medication is started    Case discussed with medicine consult attending  Will sign off, please reconsult with additional questions or concerns.

## 2022-01-10 NOTE — BH INPATIENT PSYCHIATRY PROGRESS NOTE - NSBHMETABOLIC_PSY_ALL_CORE_FT
BMI: BMI (kg/m2): 26.5 (01-05-22 @ 20:20)  HbA1c:   Glucose: POCT Blood Glucose.: 117 mg/dL (12-11-21 @ 22:28)    BP: 158/91 (01-09-22 @ 17:00) (115/75 - 158/91)  Lipid Panel:  BMI: BMI (kg/m2): 26.5 (01-05-22 @ 20:20)  HbA1c:   Glucose: POCT Blood Glucose.: 117 mg/dL (12-11-21 @ 22:28)    BP: 129/84 (01-10-22 @ 08:30) (128/84 - 158/91)  Lipid Panel:

## 2022-01-10 NOTE — BH INPATIENT PSYCHIATRY PROGRESS NOTE - PRN MEDS
MEDICATIONS  (PRN):  acetaminophen     Tablet .. 650 milliGRAM(s) Oral every 6 hours PRN Moderate Pain (4 - 6)  aluminum hydroxide/magnesium hydroxide/simethicone Suspension 30 milliLiter(s) Oral every 4 hours PRN Dyspepsia  haloperidol     Tablet 5 milliGRAM(s) Oral every 6 hours PRN agitation  LORazepam     Tablet 2 milliGRAM(s) Oral every 6 hours PRN Agitation  LORazepam     Tablet 2 milliGRAM(s) Oral every 2 hours PRN CIWA-Ar score increase by 2 points and a total score of 7 or less  magnesium hydroxide Suspension 30 milliLiter(s) Oral daily PRN Constipation  nicotine  Polacrilex Gum 2 milliGRAM(s) Oral every 2 hours PRN cravings  traZODone 100 milliGRAM(s) Oral at bedtime PRN insomnia   MEDICATIONS  (PRN):  acetaminophen     Tablet .. 650 milliGRAM(s) Oral every 6 hours PRN Moderate Pain (4 - 6)  aluminum hydroxide/magnesium hydroxide/simethicone Suspension 30 milliLiter(s) Oral every 4 hours PRN Dyspepsia  haloperidol     Tablet 5 milliGRAM(s) Oral every 6 hours PRN agitation  LORazepam     Tablet 2 milliGRAM(s) Oral every 6 hours PRN Agitation  magnesium hydroxide Suspension 30 milliLiter(s) Oral daily PRN Constipation  nicotine  Polacrilex Gum 2 milliGRAM(s) Oral every 2 hours PRN cravings  traZODone 100 milliGRAM(s) Oral at bedtime PRN insomnia

## 2022-01-10 NOTE — BH INPATIENT PSYCHIATRY PROGRESS NOTE - NSTXPSYCHOGOALOTHER_PSY_ALL_CORE
Patient will stabilize mood and alleviate symptoms of psychosis to engage in discharge planning.

## 2022-01-10 NOTE — BH INPATIENT PSYCHIATRY PROGRESS NOTE - NSBHCHARTREVIEWVS_PSY_A_CORE FT
Vital Signs Last 24 Hrs  T(C): 37 (01-09-22 @ 17:00), Max: 37 (01-09-22 @ 17:00)  T(F): 98.6 (01-09-22 @ 17:00), Max: 98.6 (01-09-22 @ 17:00)  HR: 68 (01-09-22 @ 17:00) (68 - 81)  BP: 158/91 (01-09-22 @ 17:00) (149/84 - 158/91)  BP(mean): --  RR: 18 (01-09-22 @ 17:00) (18 - 18)  SpO2: 97% (01-09-22 @ 17:00) (97% - 99%)     Vital Signs Last 24 Hrs  T(C): 36.7 (01-10-22 @ 08:30), Max: 37 (01-09-22 @ 17:00)  T(F): 98 (01-10-22 @ 08:30), Max: 98.6 (01-09-22 @ 17:00)  HR: 72 (01-10-22 @ 08:30) (68 - 72)  BP: 129/84 (01-10-22 @ 08:30) (129/84 - 158/91)  BP(mean): --  RR: 18 (01-10-22 @ 08:30) (18 - 18)  SpO2: 97% (01-10-22 @ 08:30) (97% - 97%)

## 2022-01-10 NOTE — BH INPATIENT PSYCHIATRY PROGRESS NOTE - NSBHFUPINTERVALHXFT_PSY_A_CORE
in bed; avoidant; Isolative; stays in bed; makes minimal eye contact; not conversational.  Immediately states "I am hearing voices telling me to kill myself" and won't elaborate beyond that but does acknowledge  Sleep  appetite ok; no pain issues.  Mood anxious.  avoids eye contact. Accepting Seroquel dose increase.  bp continues elevated; may need to consult medicine.

## 2022-01-10 NOTE — BH INPATIENT PSYCHIATRY PROGRESS NOTE - CURRENT MEDICATION
MEDICATIONS  (STANDING):  folic acid 1 milliGRAM(s) Oral daily  multivitamin 1 Tablet(s) Oral daily  QUEtiapine 300 milliGRAM(s) Oral at bedtime    MEDICATIONS  (PRN):  acetaminophen     Tablet .. 650 milliGRAM(s) Oral every 6 hours PRN Moderate Pain (4 - 6)  aluminum hydroxide/magnesium hydroxide/simethicone Suspension 30 milliLiter(s) Oral every 4 hours PRN Dyspepsia  haloperidol     Tablet 5 milliGRAM(s) Oral every 6 hours PRN agitation  LORazepam     Tablet 2 milliGRAM(s) Oral every 6 hours PRN Agitation  LORazepam     Tablet 2 milliGRAM(s) Oral every 2 hours PRN CIWA-Ar score increase by 2 points and a total score of 7 or less  magnesium hydroxide Suspension 30 milliLiter(s) Oral daily PRN Constipation  nicotine  Polacrilex Gum 2 milliGRAM(s) Oral every 2 hours PRN cravings  traZODone 100 milliGRAM(s) Oral at bedtime PRN insomnia   MEDICATIONS  (STANDING):  folic acid 1 milliGRAM(s) Oral daily  multivitamin 1 Tablet(s) Oral daily  QUEtiapine 300 milliGRAM(s) Oral at bedtime    MEDICATIONS  (PRN):  acetaminophen     Tablet .. 650 milliGRAM(s) Oral every 6 hours PRN Moderate Pain (4 - 6)  aluminum hydroxide/magnesium hydroxide/simethicone Suspension 30 milliLiter(s) Oral every 4 hours PRN Dyspepsia  haloperidol     Tablet 5 milliGRAM(s) Oral every 6 hours PRN agitation  LORazepam     Tablet 2 milliGRAM(s) Oral every 6 hours PRN Agitation  magnesium hydroxide Suspension 30 milliLiter(s) Oral daily PRN Constipation  nicotine  Polacrilex Gum 2 milliGRAM(s) Oral every 2 hours PRN cravings  traZODone 100 milliGRAM(s) Oral at bedtime PRN insomnia

## 2022-01-10 NOTE — CONSULT NOTE ADULT - ATTENDING COMMENTS
Called Shweta back and informed her that the script for Diamox was sent to her pharmacy on file, per Dr Gomez Handy  He would also like for her to call the office after she has her optho f/u  Left detailed message on her machine and she may call back with any further questions  Patient was seen and examined with the resident team today.  I agree with Dr. Bejarano's assessment and plan with the following exceptions/additions:     Briefly, this is a 62yo gentleman with a PMH of HTN, HCV, schizoaffective disorder and active cocaine use who p/w SI.  Medicine consulted for HTN.   SBP ranging from 120-140's, despite several days out from his last use of cocaine.      -- start Amlodipine as indicated above.    -- no additional BP recs unless BP persistently >160  -- re-check BP in 1 week to adjust Amlodipine dosing (next step-up would be 7.5mg daily)   -- remainder of plan as per primary team    Will sign-off at this time.  Please feel free to reconsult with additional questions/concerns.     Nellie Magdaleno  [phone]

## 2022-01-10 NOTE — CONSULT NOTE ADULT - SUBJECTIVE AND OBJECTIVE BOX
HPI: 61 year old male with htn, HCV, schizophrenia/schizoeffective disorder, polysubstance use, who presents for suicidal ideation. Pt reported hearing voices telling him to kill himself. Says he was thinking about jumping in front of a train. Lasto drank alcohol and used cocaine 1 day prior to admission. Utox positive for cocaine, benzo's and THC. Denies any other complaints at time of admission. Medicine consulted for htn.     Interval Hx: Since admission, SBP has ranged from 120-150's, with most recent  this morning. Patient denies any headaches or changes in vision. He says he used to take a medication for htn but is unable to recall it at this point in time. He denies any allergies or reactions to medications he has previously taken.       PAST MEDICAL & SURGICAL HISTORY:  Schizophrenia    Hepatitis C    Drug abuse    Hypothyroid    Hypertension    No significant past surgical history      Home Meds: Home Medications:    Allergies: Allergies    No Known Allergies    Intolerances      Soc:   Advanced Directives: Presumed Full Code     CURRENT MEDICATIONS:   --------------------------------------------------------------------------------------  Neurologic Medications  acetaminophen     Tablet .. 650 milliGRAM(s) Oral every 6 hours PRN Moderate Pain (4 - 6)  haloperidol     Tablet 5 milliGRAM(s) Oral every 6 hours PRN agitation  LORazepam     Tablet 2 milliGRAM(s) Oral every 6 hours PRN severe anxiety and panic  QUEtiapine 300 milliGRAM(s) Oral at bedtime  traZODone 100 milliGRAM(s) Oral at bedtime PRN insomnia    Respiratory Medications    Cardiovascular Medications    Gastrointestinal Medications  aluminum hydroxide/magnesium hydroxide/simethicone Suspension 30 milliLiter(s) Oral every 4 hours PRN Dyspepsia  folic acid 1 milliGRAM(s) Oral daily  magnesium hydroxide Suspension 30 milliLiter(s) Oral daily PRN Constipation  multivitamin 1 Tablet(s) Oral daily    Genitourinary Medications    Hematologic/Oncologic Medications    Antimicrobial/Immunologic Medications    Endocrine/Metabolic Medications    Topical/Other Medications  nicotine  Polacrilex Gum 2 milliGRAM(s) Oral every 2 hours PRN cravings    --------------------------------------------------------------------------------------    VITAL SIGNS, INS/OUTS (last 24 hours):  --------------------------------------------------------------------------------------  ICU Vital Signs Last 24 Hrs  T(C): 36.7 (10 Blayne 2022 08:30), Max: 37 (09 Jan 2022 17:00)  T(F): 98 (10 Blayne 2022 08:30), Max: 98.6 (09 Jan 2022 17:00)  HR: 72 (10 Blayne 2022 08:30) (68 - 72)  BP: 129/84 (10 Blayne 2022 08:30) (129/84 - 158/91)  BP(mean): --  ABP: --  ABP(mean): --  RR: 18 (10 Blayne 2022 08:30) (18 - 18)  SpO2: 97% (10 Blayne 2022 08:30) (97% - 97%)    I&O's Summary    --------------------------------------------------------------------------------------    EXAM:  General/Neuro: Normal, NAD, alert, oriented x 3, no focal deficits. PERRLA   Respiratory: Lungs clear to auscultation, Normal expansion/effort. no crackles, rhonchi or wheezes appreciated.  Cardiovascular:  S1, S2.  Regular rate and rhythm.   GI: Abdomen soft, Non-tender, Non-distended. no reboud or guarding  Extremities: warm, pink, well-perfused. No edema  Vascular: Good peripheral pulses   HPI: 61 year old male with htn, HCV, schizophrenia/schizoeffective disorder, polysubstance use, who presents for suicidal ideation. Pt reported hearing voices telling him to kill himself. Says he was thinking about jumping in front of a train. Lasto drank alcohol and used cocaine 1 day prior to admission. Utox positive for cocaine, benzo's and THC. Denies any other complaints at time of admission. Medicine consulted for htn.     Interval Hx: Since admission, SBP has ranged from 120-150's, with most recent  this morning. Patient denies any headaches or changes in vision. He says he used to take a medication for htn but is unable to recall it at this point in time. He denies any allergies or reactions to medications he has previously taken.       PAST MEDICAL & SURGICAL HISTORY:  Schizophrenia    Hepatitis C    Drug abuse    Hypothyroid    Hypertension    No significant past surgical history      Home Meds: Home Medications:    Allergies: Allergies    No Known Allergies    Intolerances    Family Hx:  Non-contributory to this presentation.    Soc:   Advanced Directives: Presumed Full Code     CURRENT MEDICATIONS:   --------------------------------------------------------------------------------------  Neurologic Medications  acetaminophen     Tablet .. 650 milliGRAM(s) Oral every 6 hours PRN Moderate Pain (4 - 6)  haloperidol     Tablet 5 milliGRAM(s) Oral every 6 hours PRN agitation  LORazepam     Tablet 2 milliGRAM(s) Oral every 6 hours PRN severe anxiety and panic  QUEtiapine 300 milliGRAM(s) Oral at bedtime  traZODone 100 milliGRAM(s) Oral at bedtime PRN insomnia    Respiratory Medications    Cardiovascular Medications    Gastrointestinal Medications  aluminum hydroxide/magnesium hydroxide/simethicone Suspension 30 milliLiter(s) Oral every 4 hours PRN Dyspepsia  folic acid 1 milliGRAM(s) Oral daily  magnesium hydroxide Suspension 30 milliLiter(s) Oral daily PRN Constipation  multivitamin 1 Tablet(s) Oral daily    Genitourinary Medications    Hematologic/Oncologic Medications    Antimicrobial/Immunologic Medications    Endocrine/Metabolic Medications    Topical/Other Medications  nicotine  Polacrilex Gum 2 milliGRAM(s) Oral every 2 hours PRN cravings    --------------------------------------------------------------------------------------    VITAL SIGNS, INS/OUTS (last 24 hours):  --------------------------------------------------------------------------------------  ICU Vital Signs Last 24 Hrs  T(C): 36.7 (10 Blayne 2022 08:30), Max: 37 (09 Jan 2022 17:00)  T(F): 98 (10 Blayne 2022 08:30), Max: 98.6 (09 Jan 2022 17:00)  HR: 72 (10 Blayne 2022 08:30) (68 - 72)  BP: 129/84 (10 Blayne 2022 08:30) (129/84 - 158/91)  BP(mean): --  ABP: --  ABP(mean): --  RR: 18 (10 Blayne 2022 08:30) (18 - 18)  SpO2: 97% (10 Blayne 2022 08:30) (97% - 97%)    I&O's Summary    --------------------------------------------------------------------------------------    EXAM:  General/Neuro: Normal, NAD, alert, oriented x 3, no focal deficits. PERRLA   Respiratory: Lungs clear to auscultation, Normal expansion/effort. no crackles, rhonchi or wheezes appreciated.  Cardiovascular:  S1, S2.  Regular rate and rhythm.   GI: Abdomen soft, Non-tender, Non-distended. no reboud or guarding  Extremities: warm, pink, well-perfused. No edema  Vascular: Good peripheral pulses

## 2022-01-10 NOTE — CHART NOTE - NSCHARTNOTEFT_GEN_A_CORE
Patient tested positive for COVID-19 today 1/10/22 (previous test 1/5/22 is negative). Patient has stable vital signs and saturating 96% on room air, was seen by medicine consult early in the afternoon for hypertension, recs appreciated and signed off. Spoke to unit Chief Dr Weiner, RN Trevor, Inf Control-Epidemiology on call NP Julio, and Medicine consult Dr Dwyer of positive COVID test. Will follow directions by infection control for isolation, surveillance, and other precautions.

## 2022-01-10 NOTE — BH INPATIENT PSYCHIATRY PROGRESS NOTE - NSBHASSESSSUMMFT_PSY_ALL_CORE
1/7: Despite taking a few doses of quetiapine and having increased temporal distance from last use of substances, the patient reported having worsening of his CAH and continues to endorse active suicidal ideation with plan and intent to jump in front of a train. He also complains of depressed mood and insomnia. Reported having continued withdrawal symptoms though vitals do not show any autonomic instability, he does not appear tremulous on exam, and maintains attention though was not oriented to place. Affect remains constricted with some anxiety. Writer does have some suspicion for some cognitive impairment given his lack of orientation to place, statement of his age being 64, and denying any alcohol use prior to this past year. Interestingly, he did not remember that writer is one of his doctors. No nystagmus or ataxic gait on exam, did not appear to have any confabulation. MOCA results described below. However, even if there is an active neurocognitive issue, he has shown himself capable of navigating the health care system and did not present as either cachectic or particularly disheveled when he came to the hospital, indicating ability to care for self. Will change quetiapine to 200 mg at night given his inconsistent adherence. Trazodone will be made prn at 100 mg at night for insomnia. Remains unsafe for discharge given continued suicidality.     MOCA was conducted, with score of 16/30 (extra point given due to highest level of ed being 10th grade). He was unable to complete trail, copy cube, name rhino or camel. On registration he consistently could not remember first 2 items on both trials. He was unable to state digits in forward order, only remembered first, 4th and 5th digits. He could not maintain attention through list of letters, could not subtract 7's more than once, not able to name more than 1 word w/ F, did not have similarity of watch and ruler. He got none of the words for delayed recall, and only face and red from mult choice cues. He was fully oriented. His effort was poor overall, so it is unclear if this is a fully valid assessment.     ;;01/09: more social than in past; still complains of AH commanding self harm.  Alert; oriented; cognition intact; speech clear; no tremor or evidence of movement impairment.  increasing Seroquel to 300mg at night (last admission here left on 600-700mg at night)  ;;01/10: continues guarded and evasive only briskly speaking of command AH ; accepting does increase of Seroquel.  May need to add antihypertensive.   Current medications acetaminophen     Tablet .. 650 milliGRAM(s) Oral every 6 hours PRN  aluminum hydroxide/magnesium hydroxide/simethicone Suspension 30 milliLiter(s) Oral every 4 hours PRN  folic acid 1 milliGRAM(s) Oral daily  haloperidol     Tablet 5 milliGRAM(s) Oral every 6 hours PRN  LORazepam     Tablet 2 milliGRAM(s) Oral every 6 hours PRN  LORazepam     Tablet 2 milliGRAM(s) Oral every 2 hours PRN  magnesium hydroxide Suspension 30 milliLiter(s) Oral daily PRN  multivitamin 1 Tablet(s) Oral daily  nicotine  Polacrilex Gum 2 milliGRAM(s) Oral every 2 hours PRN  QUEtiapine 300 milliGRAM(s) Oral at bedtime to be raised to 400mg at night;   traZODone 100 milliGRAM(s) Oral at bedtime PRN   1/7: Despite taking a few doses of quetiapine and having increased temporal distance from last use of substances, the patient reported having worsening of his CAH and continues to endorse active suicidal ideation with plan and intent to jump in front of a train. He also complains of depressed mood and insomnia. Reported having continued withdrawal symptoms though vitals do not show any autonomic instability, he does not appear tremulous on exam, and maintains attention though was not oriented to place. Affect remains constricted with some anxiety. Writer does have some suspicion for some cognitive impairment given his lack of orientation to place, statement of his age being 64, and denying any alcohol use prior to this past year. Interestingly, he did not remember that writer is one of his doctors. No nystagmus or ataxic gait on exam, did not appear to have any confabulation. MOCA results described below. However, even if there is an active neurocognitive issue, he has shown himself capable of navigating the health care system and did not present as either cachectic or particularly disheveled when he came to the hospital, indicating ability to care for self. Will change quetiapine to 200 mg at night given his inconsistent adherence. Trazodone will be made prn at 100 mg at night for insomnia. Remains unsafe for discharge given continued suicidality.     MOCA was conducted, with score of 16/30 (extra point given due to highest level of ed being 10th grade). He was unable to complete trail, copy cube, name rhino or camel. On registration he consistently could not remember first 2 items on both trials. He was unable to state digits in forward order, only remembered first, 4th and 5th digits. He could not maintain attention through list of letters, could not subtract 7's more than once, not able to name more than 1 word w/ F, did not have similarity of watch and ruler. He got none of the words for delayed recall, and only face and red from mult choice cues. He was fully oriented. His effort was poor overall, so it is unclear if this is a fully valid assessment.     ;;01/09: more social than in past; still complains of AH commanding self harm.  Alert; oriented; cognition intact; speech clear; no tremor or evidence of movement impairment.  increasing Seroquel to 300mg at night (last admission here left on 600-700mg at night)  ;;01/10: continues guarded and evasive only briskly speaking of command AH ; accepting does increase of Seroquel.  May need to add antihypertensive.   Current medications acetaminophen     Tablet .. 650 milliGRAM(s) Oral every 6 hours PRN  aluminum hydroxide/magnesium hydroxide/simethicone Suspension 30 milliLiter(s) Oral every 4 hours PRN  folic acid 1 milliGRAM(s) Oral daily  haloperidol     Tablet 5 milliGRAM(s) Oral every 6 hours PRN  LORazepam     Tablet 2 milliGRAM(s) Oral every 6 hours PRN  LORazepam     Tablet 2 milliGRAM(s) Oral every 2 hours PRN  magnesium hydroxide Suspension 30 milliLiter(s) Oral daily PRN  multivitamin 1 Tablet(s) Oral daily  nicotine  Polacrilex Gum 2 milliGRAM(s) Oral every 2 hours PRN  QUEtiapine 300 milliGRAM(s) Oral at bedtime to be raised to 400mg at night;   traZODone 100 milliGRAM(s) Oral at bedtime PRN    1/10: Patient was also seen by PGY-2, Dr. Seo. Patient also mentioned similar statements to writer, including worsening of CAH telling him to kill himself. He continued to endorse active SI with plan to jump in front of a train were he to be discharged, but he stated he felt safe in the hospital and would notify staff if he felt he would hurt himself. He denied any side effects to increase in seroquel and accepted increase to 400 mg at night. Sleeping well with prn trazodone as well. Will consult medicine for antihypertensive guidance, remainder of plan per Dr. Proctor's documentation.

## 2022-01-11 PROCEDURE — 99233 SBSQ HOSP IP/OBS HIGH 50: CPT

## 2022-01-11 RX ORDER — QUETIAPINE FUMARATE 200 MG/1
400 TABLET, FILM COATED ORAL AT BEDTIME
Refills: 0 | Status: DISCONTINUED | OUTPATIENT
Start: 2022-01-11 | End: 2022-01-11

## 2022-01-11 RX ORDER — QUETIAPINE FUMARATE 200 MG/1
300 TABLET, FILM COATED ORAL AT BEDTIME
Refills: 0 | Status: DISCONTINUED | OUTPATIENT
Start: 2022-01-11 | End: 2022-01-12

## 2022-01-11 RX ADMIN — QUETIAPINE FUMARATE 300 MILLIGRAM(S): 200 TABLET, FILM COATED ORAL at 21:41

## 2022-01-11 RX ADMIN — Medication 100 MILLIGRAM(S): at 21:41

## 2022-01-11 RX ADMIN — Medication 1 MILLIGRAM(S): at 12:30

## 2022-01-11 RX ADMIN — Medication 1 TABLET(S): at 12:30

## 2022-01-11 NOTE — BH INPATIENT PSYCHIATRY PROGRESS NOTE - CURRENT MEDICATION
MEDICATIONS  (STANDING):  folic acid 1 milliGRAM(s) Oral daily  multivitamin 1 Tablet(s) Oral daily  QUEtiapine 300 milliGRAM(s) Oral at bedtime    MEDICATIONS  (PRN):  acetaminophen     Tablet .. 650 milliGRAM(s) Oral every 6 hours PRN Moderate Pain (4 - 6)  aluminum hydroxide/magnesium hydroxide/simethicone Suspension 30 milliLiter(s) Oral every 4 hours PRN Dyspepsia  haloperidol     Tablet 5 milliGRAM(s) Oral every 6 hours PRN agitation  LORazepam     Tablet 2 milliGRAM(s) Oral every 6 hours PRN severe anxiety and panic  magnesium hydroxide Suspension 30 milliLiter(s) Oral daily PRN Constipation  nicotine  Polacrilex Gum 2 milliGRAM(s) Oral every 2 hours PRN cravings  traZODone 100 milliGRAM(s) Oral at bedtime PRN insomnia

## 2022-01-11 NOTE — BH INPATIENT PSYCHIATRY PROGRESS NOTE - NSBHCHARTREVIEWVS_PSY_A_CORE FT
Vital Signs Last 24 Hrs  T(C): 36.5 (01-11-22 @ 05:56), Max: 36.9 (01-10-22 @ 16:15)  T(F): 97.7 (01-11-22 @ 05:56), Max: 98.4 (01-10-22 @ 16:15)  HR: 71 (01-11-22 @ 05:56) (71 - 80)  BP: 136/81 (01-11-22 @ 05:56) (125/80 - 136/81)  BP(mean): --  RR: 18 (01-11-22 @ 05:56) (18 - 18)  SpO2: 98% (01-11-22 @ 05:56) (96% - 98%)     Vital Signs Last 24 Hrs  T(C): 36.5 (01-11-22 @ 05:56), Max: 36.5 (01-11-22 @ 05:56)  T(F): 97.7 (01-11-22 @ 05:56), Max: 97.7 (01-11-22 @ 05:56)  HR: 71 (01-11-22 @ 05:56) (71 - 71)  BP: 136/81 (01-11-22 @ 05:56) (136/81 - 136/81)  BP(mean): --  RR: 18 (01-11-22 @ 05:56) (18 - 18)  SpO2: 98% (01-11-22 @ 05:56) (98% - 98%)

## 2022-01-11 NOTE — BH INPATIENT PSYCHIATRY PROGRESS NOTE - NSBHASSESSSUMMFT_PSY_ALL_CORE
1/7: Despite taking a few doses of quetiapine and having increased temporal distance from last use of substances, the patient reported having worsening of his CAH and continues to endorse active suicidal ideation with plan and intent to jump in front of a train. He also complains of depressed mood and insomnia. Reported having continued withdrawal symptoms though vitals do not show any autonomic instability, he does not appear tremulous on exam, and maintains attention though was not oriented to place. Affect remains constricted with some anxiety. Writer does have some suspicion for some cognitive impairment given his lack of orientation to place, statement of his age being 64, and denying any alcohol use prior to this past year. Interestingly, he did not remember that writer is one of his doctors. No nystagmus or ataxic gait on exam, did not appear to have any confabulation. MOCA results described below. However, even if there is an active neurocognitive issue, he has shown himself capable of navigating the health care system and did not present as either cachectic or particularly disheveled when he came to the hospital, indicating ability to care for self. Will change quetiapine to 200 mg at night given his inconsistent adherence. Trazodone will be made prn at 100 mg at night for insomnia. Remains unsafe for discharge given continued suicidality.     MOCA was conducted, with score of 16/30 (extra point given due to highest level of ed being 10th grade). He was unable to complete trail, copy cube, name rhino or camel. On registration he consistently could not remember first 2 items on both trials. He was unable to state digits in forward order, only remembered first, 4th and 5th digits. He could not maintain attention through list of letters, could not subtract 7's more than once, not able to name more than 1 word w/ F, did not have similarity of watch and ruler. He got none of the words for delayed recall, and only face and red from mult choice cues. He was fully oriented. His effort was poor overall, so it is unclear if this is a fully valid assessment.     ;;01/09: more social than in past; still complains of AH commanding self harm.  Alert; oriented; cognition intact; speech clear; no tremor or evidence of movement impairment.  increasing Seroquel to 300mg at night (last admission here left on 600-700mg at night)  ;;01/10: continues guarded and evasive only briskly speaking of command AH ; accepting does increase of Seroquel.  May need to add antihypertensive.   Current medications acetaminophen     Tablet .. 650 milliGRAM(s) Oral every 6 hours PRN  aluminum hydroxide/magnesium hydroxide/simethicone Suspension 30 milliLiter(s) Oral every 4 hours PRN  folic acid 1 milliGRAM(s) Oral daily  haloperidol     Tablet 5 milliGRAM(s) Oral every 6 hours PRN  LORazepam     Tablet 2 milliGRAM(s) Oral every 6 hours PRN  LORazepam     Tablet 2 milliGRAM(s) Oral every 2 hours PRN  magnesium hydroxide Suspension 30 milliLiter(s) Oral daily PRN  multivitamin 1 Tablet(s) Oral daily  nicotine  Polacrilex Gum 2 milliGRAM(s) Oral every 2 hours PRN  QUEtiapine 300 milliGRAM(s) Oral at bedtime to be raised to 400mg at night;   traZODone 100 milliGRAM(s) Oral at bedtime PRN    1/10: in bed; avoidant; Isolative; stays in bed; makes minimal eye contact; not conversational.  Immediately states "I am hearing voices telling me to kill myself" and won't elaborate beyond that but does acknowledge  Sleep  appetite ok; no pain issues.  Mood anxious.  avoids eye contact. Accepting Seroquel dose increase.  bp continues elevated; may need to consult medicine.    ;;01/11: patient is COVID positive; says little to the writer; poor eye contact; accepting remaingi in room in isolation area ;Seroquel to remain at 300mg at night for a couple of nights ; repeat COVID test on 01/13.     1/7: Despite taking a few doses of quetiapine and having increased temporal distance from last use of substances, the patient reported having worsening of his CAH and continues to endorse active suicidal ideation with plan and intent to jump in front of a train. He also complains of depressed mood and insomnia. Reported having continued withdrawal symptoms though vitals do not show any autonomic instability, he does not appear tremulous on exam, and maintains attention though was not oriented to place. Affect remains constricted with some anxiety. Writer does have some suspicion for some cognitive impairment given his lack of orientation to place, statement of his age being 64, and denying any alcohol use prior to this past year. Interestingly, he did not remember that writer is one of his doctors. No nystagmus or ataxic gait on exam, did not appear to have any confabulation. MOCA results described below. However, even if there is an active neurocognitive issue, he has shown himself capable of navigating the health care system and did not present as either cachectic or particularly disheveled when he came to the hospital, indicating ability to care for self. Will change quetiapine to 200 mg at night given his inconsistent adherence. Trazodone will be made prn at 100 mg at night for insomnia. Remains unsafe for discharge given continued suicidality.     MOCA was conducted, with score of 16/30 (extra point given due to highest level of ed being 10th grade). He was unable to complete trail, copy cube, name rhino or camel. On registration he consistently could not remember first 2 items on both trials. He was unable to state digits in forward order, only remembered first, 4th and 5th digits. He could not maintain attention through list of letters, could not subtract 7's more than once, not able to name more than 1 word w/ F, did not have similarity of watch and ruler. He got none of the words for delayed recall, and only face and red from mult choice cues. He was fully oriented. His effort was poor overall, so it is unclear if this is a fully valid assessment.     ;;01/09: more social than in past; still complains of AH commanding self harm.  Alert; oriented; cognition intact; speech clear; no tremor or evidence of movement impairment.  increasing Seroquel to 300mg at night (last admission here left on 600-700mg at night)  ;;01/10: continues guarded and evasive only briskly speaking of command AH ; accepting does increase of Seroquel.  May need to add antihypertensive.   Current medications acetaminophen     Tablet .. 650 milliGRAM(s) Oral every 6 hours PRN  aluminum hydroxide/magnesium hydroxide/simethicone Suspension 30 milliLiter(s) Oral every 4 hours PRN  folic acid 1 milliGRAM(s) Oral daily  haloperidol     Tablet 5 milliGRAM(s) Oral every 6 hours PRN  LORazepam     Tablet 2 milliGRAM(s) Oral every 6 hours PRN  LORazepam     Tablet 2 milliGRAM(s) Oral every 2 hours PRN  magnesium hydroxide Suspension 30 milliLiter(s) Oral daily PRN  multivitamin 1 Tablet(s) Oral daily  nicotine  Polacrilex Gum 2 milliGRAM(s) Oral every 2 hours PRN  QUEtiapine 300 milliGRAM(s) Oral at bedtime to be raised to 400mg at night;   traZODone 100 milliGRAM(s) Oral at bedtime PRN    1/10: in bed; avoidant; Isolative; stays in bed; makes minimal eye contact; not conversational.  Immediately states "I am hearing voices telling me to kill myself" and won't elaborate beyond that but does acknowledge  Sleep  appetite ok; no pain issues.  Mood anxious.  avoids eye contact. Accepting Seroquel dose increase.  bp continues elevated; may need to consult medicine.    ;;01/11: patient is COVID positive; says little to the writer; poor eye contact; accepting remaingi in room in isolation area ;Seroquel to remain at 300mg at night for a couple of nights ; repeat COVID test on 01/13.      1/11: He was also seen by PGY-2, Dr. Seo. Continues to complain of CAH and active SI with plan and intent. He also was a bit more conversational with writer but in regards to his anxiety over being diagnosed with COVID. Per Dr. Proctor's plan, his quetiapine dosing will remain constant at 300 mg qhs to reduce chance of any respiratory issues while he remains COVID positive.

## 2022-01-11 NOTE — BH INPATIENT PSYCHIATRY PROGRESS NOTE - OTHER
Voice telling him to overdose on pills or jump in front of a train Oddly related Significant bruxism Mild poverty, more focused on demonstrating his need for hospitalization Slight imbalance to gait but otherwise not ataxic [place] thinking but linear

## 2022-01-11 NOTE — BH INPATIENT PSYCHIATRY PROGRESS NOTE - PRN MEDS
MEDICATIONS  (PRN):  acetaminophen     Tablet .. 650 milliGRAM(s) Oral every 6 hours PRN Moderate Pain (4 - 6)  aluminum hydroxide/magnesium hydroxide/simethicone Suspension 30 milliLiter(s) Oral every 4 hours PRN Dyspepsia  haloperidol     Tablet 5 milliGRAM(s) Oral every 6 hours PRN agitation  LORazepam     Tablet 2 milliGRAM(s) Oral every 6 hours PRN severe anxiety and panic  magnesium hydroxide Suspension 30 milliLiter(s) Oral daily PRN Constipation  nicotine  Polacrilex Gum 2 milliGRAM(s) Oral every 2 hours PRN cravings  traZODone 100 milliGRAM(s) Oral at bedtime PRN insomnia

## 2022-01-11 NOTE — BH INPATIENT PSYCHIATRY PROGRESS NOTE - NSBHMETABOLIC_PSY_ALL_CORE_FT
BMI: BMI (kg/m2): 26.5 (01-05-22 @ 20:20)  HbA1c:   Glucose: POCT Blood Glucose.: 117 mg/dL (12-11-21 @ 22:28)    BP: 136/81 (01-11-22 @ 05:56) (125/80 - 158/91)  Lipid Panel:

## 2022-01-11 NOTE — BH INPATIENT PSYCHIATRY PROGRESS NOTE - NSBHFUPINTERVALHXFT_PSY_A_CORE
in bed; avoidant; Isolative; stays in bed; makes minimal eye contact; not conversational.  Immediately states "I am hearing voices telling me to kill myself" and won't elaborate beyond that but does acknowledge  Sleep  appetite ok; no pain issues.  Mood anxious.  avoids eye contact. Accepting Seroquel dose increase.  bp continues elevated; may need to consult medicine.   No events overnight, adherent to medications. He was diagnosed with COVID after being tested yesterday. He expressed some concern today about it, asking writer a few times if he would die from this, but was able to reason through his anxiety after discussion with writer. He also noted frustration with not being able to use the phone and being confined to a different area of the unit. He continues to complain of CAH telling him to kill himself, no improvement despite consistent dosing of quetiapine. He also maintains active SI with plan to jump in front of train with intent.

## 2022-01-12 PROCEDURE — 99233 SBSQ HOSP IP/OBS HIGH 50: CPT

## 2022-01-12 RX ORDER — QUETIAPINE FUMARATE 200 MG/1
400 TABLET, FILM COATED ORAL AT BEDTIME
Refills: 0 | Status: DISCONTINUED | OUTPATIENT
Start: 2022-01-12 | End: 2022-01-19

## 2022-01-12 RX ADMIN — Medication 1 MILLIGRAM(S): at 09:53

## 2022-01-12 RX ADMIN — Medication 1 TABLET(S): at 09:53

## 2022-01-12 RX ADMIN — QUETIAPINE FUMARATE 400 MILLIGRAM(S): 200 TABLET, FILM COATED ORAL at 21:53

## 2022-01-12 RX ADMIN — Medication 2 MILLIGRAM(S): at 10:06

## 2022-01-12 NOTE — BH INPATIENT PSYCHIATRY PROGRESS NOTE - CURRENT MEDICATION
MEDICATIONS  (STANDING):  folic acid 1 milliGRAM(s) Oral daily  multivitamin 1 Tablet(s) Oral daily  QUEtiapine 300 milliGRAM(s) Oral at bedtime    MEDICATIONS  (PRN):  acetaminophen     Tablet .. 650 milliGRAM(s) Oral every 6 hours PRN Moderate Pain (4 - 6)  aluminum hydroxide/magnesium hydroxide/simethicone Suspension 30 milliLiter(s) Oral every 4 hours PRN Dyspepsia  haloperidol     Tablet 5 milliGRAM(s) Oral every 6 hours PRN agitation  LORazepam     Tablet 2 milliGRAM(s) Oral every 6 hours PRN severe anxiety and panic  magnesium hydroxide Suspension 30 milliLiter(s) Oral daily PRN Constipation  nicotine  Polacrilex Gum 2 milliGRAM(s) Oral every 2 hours PRN cravings  traZODone 100 milliGRAM(s) Oral at bedtime PRN insomnia   MEDICATIONS  (STANDING):  folic acid 1 milliGRAM(s) Oral daily  multivitamin 1 Tablet(s) Oral daily  QUEtiapine 400 milliGRAM(s) Oral at bedtime    MEDICATIONS  (PRN):  acetaminophen     Tablet .. 650 milliGRAM(s) Oral every 6 hours PRN Moderate Pain (4 - 6)  aluminum hydroxide/magnesium hydroxide/simethicone Suspension 30 milliLiter(s) Oral every 4 hours PRN Dyspepsia  haloperidol     Tablet 5 milliGRAM(s) Oral every 6 hours PRN agitation  LORazepam     Tablet 2 milliGRAM(s) Oral every 6 hours PRN severe anxiety and panic  magnesium hydroxide Suspension 30 milliLiter(s) Oral daily PRN Constipation  nicotine  Polacrilex Gum 2 milliGRAM(s) Oral every 2 hours PRN cravings  traZODone 100 milliGRAM(s) Oral at bedtime PRN insomnia

## 2022-01-12 NOTE — BH INPATIENT PSYCHIATRY PROGRESS NOTE - NSBHFUPINTERVALHXFT_PSY_A_CORE
No events overnight, adherent to medications. Remains in isolation due to covid positive status. Less concerned for possibility of worsening of status, denying any symptoms at this time. He continues to complain of CAH telling him to kill himself, no improvement despite consistent dosing of quetiapine. He also maintains active SI with plan to jump in front of train with intent. Denies any other complaints.

## 2022-01-12 NOTE — BH INPATIENT PSYCHIATRY PROGRESS NOTE - NSBHMETABOLIC_PSY_ALL_CORE_FT
BMI: BMI (kg/m2): 26.5 (01-05-22 @ 20:20)  HbA1c:   Glucose: POCT Blood Glucose.: 117 mg/dL (12-11-21 @ 22:28)    BP: 136/81 (01-11-22 @ 05:56) (125/80 - 158/91)  Lipid Panel:  BMI: BMI (kg/m2): 26.5 (01-05-22 @ 20:20)  HbA1c:   Glucose: POCT Blood Glucose.: 117 mg/dL (12-11-21 @ 22:28)    BP: 121/63 (01-12-22 @ 09:51) (121/63 - 158/91)  Lipid Panel:

## 2022-01-12 NOTE — BH INPATIENT PSYCHIATRY PROGRESS NOTE - NSBHCHARTREVIEWVS_PSY_A_CORE FT
Vital Signs Last 24 Hrs  T(C): --  T(F): --  HR: --  BP: --  BP(mean): --  RR: --  SpO2: --     Vital Signs Last 24 Hrs  T(C): 36.7 (01-12-22 @ 09:51), Max: 36.7 (01-12-22 @ 09:51)  T(F): 98.1 (01-12-22 @ 09:51), Max: 98.1 (01-12-22 @ 09:51)  HR: 67 (01-12-22 @ 09:51) (67 - 67)  BP: 121/63 (01-12-22 @ 09:51) (121/63 - 121/63)  BP(mean): --  RR: 16 (01-12-22 @ 09:51) (16 - 16)  SpO2: 98% (01-12-22 @ 09:51) (98% - 98%)

## 2022-01-12 NOTE — BH INPATIENT PSYCHIATRY PROGRESS NOTE - OTHER
Voice telling him to overdose on pills or jump in front of a train Significant bruxism Slight imbalance to gait but otherwise not ataxic Oddly related Mild poverty, more focused on demonstrating his need for hospitalization [place] thinking but linear

## 2022-01-12 NOTE — BH INPATIENT PSYCHIATRY PROGRESS NOTE - NSBHASSESSSUMMFT_PSY_ALL_CORE
1/7: Despite taking a few doses of quetiapine and having increased temporal distance from last use of substances, the patient reported having worsening of his CAH and continues to endorse active suicidal ideation with plan and intent to jump in front of a train. He also complains of depressed mood and insomnia. Reported having continued withdrawal symptoms though vitals do not show any autonomic instability, he does not appear tremulous on exam, and maintains attention though was not oriented to place. Affect remains constricted with some anxiety. Writer does have some suspicion for some cognitive impairment given his lack of orientation to place, statement of his age being 64, and denying any alcohol use prior to this past year. Interestingly, he did not remember that writer is one of his doctors. No nystagmus or ataxic gait on exam, did not appear to have any confabulation. MOCA results described below. However, even if there is an active neurocognitive issue, he has shown himself capable of navigating the health care system and did not present as either cachectic or particularly disheveled when he came to the hospital, indicating ability to care for self. Will change quetiapine to 200 mg at night given his inconsistent adherence. Trazodone will be made prn at 100 mg at night for insomnia. Remains unsafe for discharge given continued suicidality.     MOCA was conducted, with score of 16/30 (extra point given due to highest level of ed being 10th grade). He was unable to complete trail, copy cube, name rhino or camel. On registration he consistently could not remember first 2 items on both trials. He was unable to state digits in forward order, only remembered first, 4th and 5th digits. He could not maintain attention through list of letters, could not subtract 7's more than once, not able to name more than 1 word w/ F, did not have similarity of watch and ruler. He got none of the words for delayed recall, and only face and red from mult choice cues. He was fully oriented. His effort was poor overall, so it is unclear if this is a fully valid assessment.     ;;01/09: more social than in past; still complains of AH commanding self harm.  Alert; oriented; cognition intact; speech clear; no tremor or evidence of movement impairment.  increasing Seroquel to 300mg at night (last admission here left on 600-700mg at night)  ;;01/10: continues guarded and evasive only briskly speaking of command AH ; accepting does increase of Seroquel.  May need to add antihypertensive.   Current medications acetaminophen     Tablet .. 650 milliGRAM(s) Oral every 6 hours PRN  aluminum hydroxide/magnesium hydroxide/simethicone Suspension 30 milliLiter(s) Oral every 4 hours PRN  folic acid 1 milliGRAM(s) Oral daily  haloperidol     Tablet 5 milliGRAM(s) Oral every 6 hours PRN  LORazepam     Tablet 2 milliGRAM(s) Oral every 6 hours PRN  LORazepam     Tablet 2 milliGRAM(s) Oral every 2 hours PRN  magnesium hydroxide Suspension 30 milliLiter(s) Oral daily PRN  multivitamin 1 Tablet(s) Oral daily  nicotine  Polacrilex Gum 2 milliGRAM(s) Oral every 2 hours PRN  QUEtiapine 300 milliGRAM(s) Oral at bedtime to be raised to 400mg at night;   traZODone 100 milliGRAM(s) Oral at bedtime PRN    1/10: in bed; avoidant; Isolative; stays in bed; makes minimal eye contact; not conversational.  Immediately states "I am hearing voices telling me to kill myself" and won't elaborate beyond that but does acknowledge  Sleep  appetite ok; no pain issues.  Mood anxious.  avoids eye contact. Accepting Seroquel dose increase.  bp continues elevated; may need to consult medicine.    ;;01/11: patient is COVID positive; says little to the writer; poor eye contact; accepting remaingi in room in isolation area ;Seroquel to remain at 300mg at night for a couple of nights ; repeat COVID test on 01/13.      1/11: He was also seen by PGY-2, Dr. Seo. Continues to complain of CAH and active SI with plan and intent. He also was a bit more conversational with writer but in regards to his anxiety over being diagnosed with COVID. Per Dr. Proctor's plan, his quetiapine dosing will remain constant at 300 mg qhs to reduce chance of any respiratory issues while he remains COVID positive.    1/7: Despite taking a few doses of quetiapine and having increased temporal distance from last use of substances, the patient reported having worsening of his CAH and continues to endorse active suicidal ideation with plan and intent to jump in front of a train. He also complains of depressed mood and insomnia. Reported having continued withdrawal symptoms though vitals do not show any autonomic instability, he does not appear tremulous on exam, and maintains attention though was not oriented to place. Affect remains constricted with some anxiety. Writer does have some suspicion for some cognitive impairment given his lack of orientation to place, statement of his age being 64, and denying any alcohol use prior to this past year. Interestingly, he did not remember that writer is one of his doctors. No nystagmus or ataxic gait on exam, did not appear to have any confabulation. MOCA results described below. However, even if there is an active neurocognitive issue, he has shown himself capable of navigating the health care system and did not present as either cachectic or particularly disheveled when he came to the hospital, indicating ability to care for self. Will change quetiapine to 200 mg at night given his inconsistent adherence. Trazodone will be made prn at 100 mg at night for insomnia. Remains unsafe for discharge given continued suicidality.     MOCA was conducted, with score of 16/30 (extra point given due to highest level of ed being 10th grade). He was unable to complete trail, copy cube, name rhino or camel. On registration he consistently could not remember first 2 items on both trials. He was unable to state digits in forward order, only remembered first, 4th and 5th digits. He could not maintain attention through list of letters, could not subtract 7's more than once, not able to name more than 1 word w/ F, did not have similarity of watch and ruler. He got none of the words for delayed recall, and only face and red from mult choice cues. He was fully oriented. His effort was poor overall, so it is unclear if this is a fully valid assessment.     ;;01/09: more social than in past; still complains of AH commanding self harm.  Alert; oriented; cognition intact; speech clear; no tremor or evidence of movement impairment.  increasing Seroquel to 300mg at night (last admission here left on 600-700mg at night)  ;;01/10: continues guarded and evasive only briskly speaking of command AH ; accepting does increase of Seroquel.  May need to add antihypertensive.   Current medications acetaminophen     Tablet .. 650 milliGRAM(s) Oral every 6 hours PRN  aluminum hydroxide/magnesium hydroxide/simethicone Suspension 30 milliLiter(s) Oral every 4 hours PRN  folic acid 1 milliGRAM(s) Oral daily  haloperidol     Tablet 5 milliGRAM(s) Oral every 6 hours PRN  LORazepam     Tablet 2 milliGRAM(s) Oral every 6 hours PRN  LORazepam     Tablet 2 milliGRAM(s) Oral every 2 hours PRN  magnesium hydroxide Suspension 30 milliLiter(s) Oral daily PRN  multivitamin 1 Tablet(s) Oral daily  nicotine  Polacrilex Gum 2 milliGRAM(s) Oral every 2 hours PRN  QUEtiapine 300 milliGRAM(s) Oral at bedtime to be raised to 400mg at night;   traZODone 100 milliGRAM(s) Oral at bedtime PRN    1/10: in bed; avoidant; Isolative; stays in bed; makes minimal eye contact; not conversational.  Immediately states "I am hearing voices telling me to kill myself" and won't elaborate beyond that but does acknowledge  Sleep  appetite ok; no pain issues.  Mood anxious.  avoids eye contact. Accepting Seroquel dose increase.  bp continues elevated; may need to consult medicine.    ;;01/11: patient is COVID positive; says little to the writer; poor eye contact; accepting remaingi in room in isolation area ;Seroquel to remain at 300mg at night for a couple of nights ; repeat COVID test on 01/13.      1/11: He was also seen by PGY-2, Dr. Seo. Continues to complain of CAH and active SI with plan and intent. He also was a bit more conversational with writer but in regards to his anxiety over being diagnosed with COVID. Per Dr. Proctor's plan, his quetiapine dosing will remain constant at 300 mg qhs to reduce chance of any respiratory issues while he remains COVID positive.     1/14: No events overnight, adherent to medication. Still continues to complain of CAH and active SI with plan and intent. Overall mental status unchanged from yesterday. Typically has been spending days reading or walking around the area designated for covid patients. Will increase quetiapine to 400 mg qhs and continue to reevaluate his mental status and suicidality.

## 2022-01-13 PROCEDURE — 99232 SBSQ HOSP IP/OBS MODERATE 35: CPT

## 2022-01-13 RX ADMIN — Medication 1 TABLET(S): at 10:28

## 2022-01-13 RX ADMIN — Medication 2 MILLIGRAM(S): at 10:28

## 2022-01-13 RX ADMIN — Medication 1 MILLIGRAM(S): at 10:28

## 2022-01-13 RX ADMIN — QUETIAPINE FUMARATE 400 MILLIGRAM(S): 200 TABLET, FILM COATED ORAL at 21:15

## 2022-01-13 NOTE — BH INPATIENT PSYCHIATRY PROGRESS NOTE - NSBHMETABOLIC_PSY_ALL_CORE_FT
BMI: BMI (kg/m2): 26.5 (01-05-22 @ 20:20)  HbA1c:   Glucose: POCT Blood Glucose.: 117 mg/dL (12-11-21 @ 22:28)    BP: 122/80 (01-13-22 @ 09:33) (121/63 - 136/81)  Lipid Panel:

## 2022-01-13 NOTE — BH INPATIENT PSYCHIATRY PROGRESS NOTE - NSBHCHARTREVIEWVS_PSY_A_CORE FT
Vital Signs Last 24 Hrs  T(C): 36.4 (01-13-22 @ 09:33), Max: 36.7 (01-12-22 @ 16:00)  T(F): 97.6 (01-13-22 @ 09:33), Max: 98.1 (01-12-22 @ 16:00)  HR: 90 (01-13-22 @ 09:33) (78 - 90)  BP: 122/80 (01-13-22 @ 09:33) (122/80 - 136/81)  BP(mean): --  RR: 20 (01-13-22 @ 09:33) (18 - 20)  SpO2: 100% (01-13-22 @ 09:33) (97% - 100%)

## 2022-01-13 NOTE — BH DISCHARGE NOTE NURSING/SOCIAL WORK/PSYCH REHAB - PATIENT PORTAL LINK FT
You can access the FollowMyHealth Patient Portal offered by [facility] by registering at the following website: [email or web address]. By joining [employer]’s FollowMyHealth portal, you will also be able to view your health information using other applications (apps) compatible with our system.

## 2022-01-13 NOTE — BH DISCHARGE NOTE NURSING/SOCIAL WORK/PSYCH REHAB - NSCDUDCCRISIS_PSY_A_CORE
[facility]  1 [phone]) [facility]-WELL ([phone])  Text "WELL" to [phone]  Website: www.[employer]/.[employer] [facility] 1 [phone] 211-UTGN (3274) Website: www.safehorizon.org/.National Suicide Prevention Lifeline 7 (246) 984-5094/.  [employer]  1 (894) LIFENET (293-6432)/.  [facility]  [phone] [place] [address] [phone]   Hours:  Monday through Friday from 9 AM to 3 PM/.  [employer] - [facility] Crisis Line  [phone], Option 1

## 2022-01-13 NOTE — BH INPATIENT PSYCHIATRY PROGRESS NOTE - NSBHASSESSSUMMFT_PSY_ALL_CORE
1/7: Despite taking a few doses of quetiapine and having increased temporal distance from last use of substances, the patient reported having worsening of his CAH and continues to endorse active suicidal ideation with plan and intent to jump in front of a train. He also complains of depressed mood and insomnia. Reported having continued withdrawal symptoms though vitals do not show any autonomic instability, he does not appear tremulous on exam, and maintains attention though was not oriented to place. Affect remains constricted with some anxiety. Writer does have some suspicion for some cognitive impairment given his lack of orientation to place, statement of his age being 64, and denying any alcohol use prior to this past year. Interestingly, he did not remember that writer is one of his doctors. No nystagmus or ataxic gait on exam, did not appear to have any confabulation. MOCA results described below. However, even if there is an active neurocognitive issue, he has shown himself capable of navigating the health care system and did not present as either cachectic or particularly disheveled when he came to the hospital, indicating ability to care for self. Will change quetiapine to 200 mg at night given his inconsistent adherence. Trazodone will be made prn at 100 mg at night for insomnia. Remains unsafe for discharge given continued suicidality.     MOCA was conducted, with score of 16/30 (extra point given due to highest level of ed being 10th grade). He was unable to complete trail, copy cube, name rhino or camel. On registration he consistently could not remember first 2 items on both trials. He was unable to state digits in forward order, only remembered first, 4th and 5th digits. He could not maintain attention through list of letters, could not subtract 7's more than once, not able to name more than 1 word w/ F, did not have similarity of watch and ruler. He got none of the words for delayed recall, and only face and red from mult choice cues. He was fully oriented. His effort was poor overall, so it is unclear if this is a fully valid assessment.     ;;01/09: more social than in past; still complains of AH commanding self harm.  Alert; oriented; cognition intact; speech clear; no tremor or evidence of movement impairment.  increasing Seroquel to 300mg at night (last admission here left on 600-700mg at night)  ;;01/10: continues guarded and evasive only briskly speaking of command AH ; accepting does increase of Seroquel.  May need to add antihypertensive.   Current medications acetaminophen     Tablet .. 650 milliGRAM(s) Oral every 6 hours PRN  aluminum hydroxide/magnesium hydroxide/simethicone Suspension 30 milliLiter(s) Oral every 4 hours PRN  folic acid 1 milliGRAM(s) Oral daily  haloperidol     Tablet 5 milliGRAM(s) Oral every 6 hours PRN  LORazepam     Tablet 2 milliGRAM(s) Oral every 6 hours PRN  LORazepam     Tablet 2 milliGRAM(s) Oral every 2 hours PRN  magnesium hydroxide Suspension 30 milliLiter(s) Oral daily PRN  multivitamin 1 Tablet(s) Oral daily  nicotine  Polacrilex Gum 2 milliGRAM(s) Oral every 2 hours PRN  QUEtiapine 300 milliGRAM(s) Oral at bedtime to be raised to 400mg at night;   traZODone 100 milliGRAM(s) Oral at bedtime PRN    1/10: in bed; avoidant; Isolative; stays in bed; makes minimal eye contact; not conversational.  Immediately states "I am hearing voices telling me to kill myself" and won't elaborate beyond that but does acknowledge  Sleep  appetite ok; no pain issues.  Mood anxious.  avoids eye contact. Accepting Seroquel dose increase.  bp continues elevated; may need to consult medicine.    ;;01/11: patient is COVID positive; says little to the writer; poor eye contact; accepting remaingi in room in isolation area ;Seroquel to remain at 300mg at night for a couple of nights ; repeat COVID test on 01/13.  ;;01/12: AH persists; patient states they started when he was 24 years old; relation to substance use?; patient maintaining quaranteen; raising Seroquel to 400mg at night for psychosis.   ;;01/13: some evidence of improvement.  No longer has SI; denies AH but still somewhat preoccupied but better eye contact and less irritability; interested in transition to COVID+ hotel.  fair adls.

## 2022-01-13 NOTE — BH INPATIENT PSYCHIATRY PROGRESS NOTE - ORIENTATION
Thought he was in [facility]
Thought he was in [facility]
Thought he was in [facility]
Thought he was in [facility]
Thought he was in [facility]
Thought he was in [facility]
Thought he was in [facility]

## 2022-01-13 NOTE — BH INPATIENT PSYCHIATRY PROGRESS NOTE - CURRENT MEDICATION
MEDICATIONS  (STANDING):  folic acid 1 milliGRAM(s) Oral daily  multivitamin 1 Tablet(s) Oral daily  QUEtiapine 400 milliGRAM(s) Oral at bedtime    MEDICATIONS  (PRN):  acetaminophen     Tablet .. 650 milliGRAM(s) Oral every 6 hours PRN Moderate Pain (4 - 6)  aluminum hydroxide/magnesium hydroxide/simethicone Suspension 30 milliLiter(s) Oral every 4 hours PRN Dyspepsia  haloperidol     Tablet 5 milliGRAM(s) Oral every 6 hours PRN agitation  LORazepam     Tablet 2 milliGRAM(s) Oral every 6 hours PRN severe anxiety and panic  magnesium hydroxide Suspension 30 milliLiter(s) Oral daily PRN Constipation  nicotine  Polacrilex Gum 2 milliGRAM(s) Oral every 2 hours PRN cravings  traZODone 100 milliGRAM(s) Oral at bedtime PRN insomnia

## 2022-01-13 NOTE — BH DISCHARGE NOTE NURSING/SOCIAL WORK/PSYCH REHAB - NSDCPRGOAL_PSY_ALL_CORE
Pt. has engaged minimally in therapeutic activities during hospitalization.  Pt. has been pleasant on approach but somewhat flat and minimally communicative.  Prior to being on isolation for COVID, pt. was frequently visible on the milieu watching television but was not very social with peers.  Pt. has endorsed improvement in mood with less suicidal ideation.  Pt. completed a safety plan.

## 2022-01-13 NOTE — BH INPATIENT PSYCHIATRY PROGRESS NOTE - NSBHPSYCHOLCOGABN_PSY_A_CORE
disoriented to place

## 2022-01-13 NOTE — BH INPATIENT PSYCHIATRY PROGRESS NOTE - NSBHFUPINTERVALHXFT_PSY_A_CORE
No events overnight, adherent to medications. Remains in isolation due to covid positive status. Less concerned for possibility of worsening of status, denying any symptoms at this time. CAH are "gone" ; interested in going to a Hotel for COVID+ patients.  ; Not endorsing  suicidal or homicidal ideation intent or plans; no mention of auditory or visual hallucinations .  More visible outside of room.  better eye contact; less constricted; less irritable.

## 2022-01-13 NOTE — BH DISCHARGE NOTE NURSING/SOCIAL WORK/PSYCH REHAB - NSDCVIVACCINE_GEN_ALL_CORE_FT
Tdap; 19-Sep-2017 20:04; Divya Nunez (RN); [employer]; [id number]; IntraMuscular; Deltoid Right.; 0.5 milliLiter(s); VIS (VIS Published: 09-May-2013, VIS Presented: 19-Sep-2017);   Tdap; 05-Nov-2020 23:51; Sheeba Shi (RN); [employer]; [id number] (Exp. Date: 31-Jul-2022); IntraMuscular; Deltoid Right.; 0.5 milliLiter(s); VIS (VIS Published: 09-May-2013, VIS Presented: 05-Nov-2020);

## 2022-01-13 NOTE — BH INPATIENT PSYCHIATRY PROGRESS NOTE - OTHER
Significant bruxism Slight imbalance to gait but otherwise not ataxic Voice telling him to overdose on pills or jump in front of a train [place] thinking but linear Mild poverty, more focused on demonstrating his need for hospitalization Oddly related

## 2022-01-14 PROCEDURE — 99239 HOSP IP/OBS DSCHRG MGMT >30: CPT

## 2022-01-14 RX ORDER — QUETIAPINE FUMARATE 200 MG/1
1 TABLET, FILM COATED ORAL
Qty: 14 | Refills: 0
Start: 2022-01-14

## 2022-01-14 RX ORDER — TRAZODONE HCL 50 MG
1 TABLET ORAL
Qty: 14 | Refills: 0
Start: 2022-01-14

## 2022-01-14 RX ORDER — QUETIAPINE FUMARATE 200 MG/1
1 TABLET, FILM COATED ORAL
Qty: 30 | Refills: 0
Start: 2022-01-14

## 2022-01-14 RX ORDER — TRAZODONE HCL 50 MG
1 TABLET ORAL
Qty: 30 | Refills: 0
Start: 2022-01-14

## 2022-01-14 RX ADMIN — Medication 1 MILLIGRAM(S): at 10:01

## 2022-01-14 RX ADMIN — Medication 100 MILLIGRAM(S): at 22:21

## 2022-01-14 RX ADMIN — Medication 1 TABLET(S): at 10:01

## 2022-01-14 RX ADMIN — QUETIAPINE FUMARATE 400 MILLIGRAM(S): 200 TABLET, FILM COATED ORAL at 22:21

## 2022-01-14 RX ADMIN — Medication 2 MILLIGRAM(S): at 10:01

## 2022-01-14 NOTE — BH INPATIENT PSYCHIATRY PROGRESS NOTE - NSBHFUPINTERVALHXFT_PSY_A_CORE
No events overnight, adherent to medications. Remains in isolation due to covid positive status. Improved relatedness, denied SI/HI, no longer has any AH/VH. He requests to be discharged to [facility] at this time. [facility]

## 2022-01-14 NOTE — BH TREATMENT PLAN - NSTXPSYCHOINTERRN_PSY_ALL_CORE
Encourage patient to adhere with medications and treatment. Encourage patient to attend groups and verbalize his feelings and concerns.

## 2022-01-14 NOTE — BH TREATMENT PLAN - NSTXALCDRGGOAL_PSY_ALL_CORE
Will not display signs/symptoms of complications of withdrawal
Will utilize coping/calming strategies to manage with withdrawal symptoms daily

## 2022-01-14 NOTE — BH INPATIENT PSYCHIATRY DISCHARGE NOTE - CASE SUMMARY
Pt wants discharge and says his symptoms have completely resolved. Malingering for shelter has always been a strong suspicion. I have always suspected hx of TBI and some limitations in cognition with him. He is at baseline now. Had a positive COVID test on the unit. MSE- Fairly groomed and related, good EC. -PMR/A Speech- wnl Mood: "OK" Affect: linear, impoverished TC: -SI/HI/AH/VH/PI. I&J: fair. Pt ready for discharge.

## 2022-01-14 NOTE — BH INPATIENT PSYCHIATRY PROGRESS NOTE - NSBHASSESSSUMMFT_PSY_ALL_CORE
1/7: Despite taking a few doses of quetiapine and having increased temporal distance from last use of substances, the patient reported having worsening of his CAH and continues to endorse active suicidal ideation with plan and intent to jump in front of a train. He also complains of depressed mood and insomnia. Reported having continued withdrawal symptoms though vitals do not show any autonomic instability, he does not appear tremulous on exam, and maintains attention though was not oriented to place. Affect remains constricted with some anxiety. Writer does have some suspicion for some cognitive impairment given his lack of orientation to place, statement of his age being 64, and denying any alcohol use prior to this past year. Interestingly, he did not remember that writer is one of his doctors. No nystagmus or ataxic gait on exam, did not appear to have any confabulation. MOCA results described below. However, even if there is an active neurocognitive issue, he has shown himself capable of navigating the health care system and did not present as either cachectic or particularly disheveled when he came to the hospital, indicating ability to care for self. Will change quetiapine to 200 mg at night given his inconsistent adherence. Trazodone will be made prn at 100 mg at night for insomnia. Remains unsafe for discharge given continued suicidality.     MOCA was conducted, with score of 16/30 (extra point given due to highest level of ed being 10th grade). He was unable to complete trail, copy cube, name rhino or camel. On registration he consistently could not remember first 2 items on both trials. He was unable to state digits in forward order, only remembered first, 4th and 5th digits. He could not maintain attention through list of letters, could not subtract 7's more than once, not able to name more than 1 word w/ F, did not have similarity of watch and ruler. He got none of the words for delayed recall, and only face and red from mult choice cues. He was fully oriented. His effort was poor overall, so it is unclear if this is a fully valid assessment.     ;;01/09: more social than in past; still complains of AH commanding self harm.  Alert; oriented; cognition intact; speech clear; no tremor or evidence of movement impairment.  increasing Seroquel to 300mg at night (last admission here left on 600-700mg at night)  ;;01/10: continues guarded and evasive only briskly speaking of command AH ; accepting does increase of Seroquel.  May need to add antihypertensive.   Current medications acetaminophen     Tablet .. 650 milliGRAM(s) Oral every 6 hours PRN  aluminum hydroxide/magnesium hydroxide/simethicone Suspension 30 milliLiter(s) Oral every 4 hours PRN  folic acid 1 milliGRAM(s) Oral daily  haloperidol     Tablet 5 milliGRAM(s) Oral every 6 hours PRN  LORazepam     Tablet 2 milliGRAM(s) Oral every 6 hours PRN  LORazepam     Tablet 2 milliGRAM(s) Oral every 2 hours PRN  magnesium hydroxide Suspension 30 milliLiter(s) Oral daily PRN  multivitamin 1 Tablet(s) Oral daily  nicotine  Polacrilex Gum 2 milliGRAM(s) Oral every 2 hours PRN  QUEtiapine 300 milliGRAM(s) Oral at bedtime to be raised to 400mg at night;   traZODone 100 milliGRAM(s) Oral at bedtime PRN    1/10: in bed; avoidant; Isolative; stays in bed; makes minimal eye contact; not conversational.  Immediately states "I am hearing voices telling me to kill myself" and won't elaborate beyond that but does acknowledge  Sleep  appetite ok; no pain issues.  Mood anxious.  avoids eye contact. Accepting Seroquel dose increase.  bp continues elevated; may need to consult medicine.    ;;01/11: patient is COVID positive; says little to the writer; poor eye contact; accepting remaingi in room in isolation area ;Seroquel to remain at 300mg at night for a couple of nights ; repeat COVID test on 01/13.  ;;01/12: AH persists; patient states they started when he was 24 years old; relation to substance use?; patient maintaining quaranteen; raising Seroquel to 400mg at night for psychosis.   ;;01/13: some evidence of improvement.  No longer has SI; denies AH but still somewhat preoccupied but better eye contact and less irritability; interested in transition to COVID+ hotel.  fair adls.   1/14: Adherent to meds, no events overnight. Continues to deny any SI, denies AH and VH as well, no HI. He has likely reached maximum level of benefit from inpatient hospitalization, working on dispo at this time.

## 2022-01-14 NOTE — BH TREATMENT PLAN - NSTXPLANTHERAPYSESSIONSFT_PSY_ALL_CORE
01-07-22  Type of therapy: Other,Assessment for MD-IT arts therapy  Type of session: Individual  Level of patient participation: Participates  Duration of participation: 30 minutes  Therapy conducted by: Psych rehab  Therapy Summary: Pt seemed less irritable than day prior, and was alert and out of bed at intervals. Pt expressed feeling hopeless about his homelessness status and endorses belief he would "be dead" if he hadn't come into the hospital. Pt endorsed past and recent SI, but Pt seemed more concerned about safety from others, as well as safety from the environmental issues of homelessness. Pt expressed delusion that without TV watching on the unit "all the patients will die." Pt presented with flattened affect and was cooprative. Pt was able to answer all questions and seemed fully oriented. Pt seemed focused on his discharge plan and speaking with his .

## 2022-01-14 NOTE — BH TREATMENT PLAN - NSTXALCDRGINTERMD_PSY_ALL_CORE
Trazodone Seroquel [facility] monitor for withdrawal 
Trazodone Seroquel [facility] monitor for withdrawal

## 2022-01-14 NOTE — BH TREATMENT PLAN - NSDCCRITERIA_PSY_ALL_CORE
no SI minimal AH not prominent or disturbing patient; not isolated; social with peers; No behavioral issues. 
no SI minimal AH not prominent or disturbing patient; not isolated; social with peers; No behavioral issues.

## 2022-01-14 NOTE — BH INPATIENT PSYCHIATRY DISCHARGE NOTE - NSDCMRMEDTOKEN_GEN_ALL_CORE_FT
QUEtiapine 400 mg oral tablet: 1 tab(s) orally once a day (at bedtime)  traZODone 100 mg oral tablet: 1 tab(s) orally once a day (at bedtime), As needed, insomnia

## 2022-01-14 NOTE — BH INPATIENT PSYCHIATRY DISCHARGE NOTE - HOSPITAL COURSE
Upon initial arrival to unit, patient continued to endorse CAH with active SI to jump in front of a train. His affect was dysphoric/anxious, and he was less cooperative/communicative initially. He had complained of some withdrawal symptoms but vitals were stable, no tremors were visible. As he was restarted on seroquel with eventual dose titration up to 400 mg at night, his CAH eventually resolved on 1/13. He no longer had any SI, HI, AH, or VH at time of discharge. Affect and relatedness also improved, revealing likely baseline level of affect restriction, concreteness/poverty of thought. He did test positive for COVID on 1/10 but was asymptomatic throughout his stay.   Currently pt is stable and asymptomatic. No report of suicidal ideation, homicidal ideation, paranoia, hallucinations, depression, or bina. Team strongly suspects his symptoms throughout the admission were feigned in order to gain shelter.     Upon initial arrival to unit, patient continued to endorse CAH with active SI to jump in front of a train. His affect was dysphoric/anxious, and he was less cooperative/communicative initially. He had complained of some withdrawal symptoms but vitals were stable, no tremors were visible. As he was restarted on seroquel with eventual dose titration up to 400 mg at night, his CAH eventually resolved on 1/13. He no longer had any SI, HI, AH, or VH at time of discharge. Affect and relatedness also improved, revealing likely baseline level of affect restriction, concreteness/poverty of thought. He did test positive for COVID on 1/10 but was asymptomatic throughout his stay.   Currently pt is stable and asymptomatic. No report of suicidal ideation, homicidal ideation, paranoia, hallucinations, depression, or bina. Team strongly suspects his symptoms throughout the admission were feigned in order to gain shelter.     Upon initial arrival to unit, patient continued to endorse CAH with active SI to jump in front of a train. His affect was dysphoric/anxious, and he was less cooperative/communicative initially. He had complained of some withdrawal symptoms but vitals were stable, no tremors were visible. As he was restarted on seroquel with eventual dose titration up to 400 mg at night, his CAH eventually resolved on 1/13, though it appeared that it resumed temporarily on 1/14 and then resolved completely on 1/17. He no longer had any SI, HI, AH, or VH at time of discharge. Affect and relatedness also improved, revealing likely baseline level of affect restriction, concreteness/poverty of thought. He did test positive for COVID on 1/10 but was asymptomatic throughout his stay.

## 2022-01-14 NOTE — BH SAFETY PLAN - WARNING SIGN 1
Pt completed a written safety plan and received a copy to take with him. An additional copy has been filed on the unit in Pt's chart.

## 2022-01-14 NOTE — BH INPATIENT PSYCHIATRY DISCHARGE NOTE - HPI (INCLUDE ILLNESS QUALITY, SEVERITY, DURATION, TIMING, CONTEXT, MODIFYING FACTORS, ASSOCIATED SIGNS AND SYMPTOMS)
Patient is a 61-year-old male, street homeless, , history of alcohol use d/o, other substance use (cocaine, cannabis), ?psychotic disorder, frequent visits to emergency room with substance-induced complaint who presented himself to the ED with complaint of command AH and SI with intent and plan.     Patient related that he started hearing auditory hallucinations 1 week ago which have since worsened. He says, "the voices tell me to go jump in front of a train." He was unable to articulate reasons for his current presentation and why he came today and not before. He denied visiting other ED's recently, brought himself in to the hospital and requested that team "admit me because I'm suicidal." He endorsed daily alcohol, cocaine, and cannabis usage. He states that he is street homeless, occasionally stays with mother in [place]. He [facility] but cannot name [facility] and states last on seroquel 300mg qhs and trazodone 100mg up to month ago. He states that he has had low mood, insomnia, low energy for over 1 week. He states poor concentration. He did not endorse any VH.    Called mother at [phone] danie Dorantes who related that patient does not live with her in [place]. Unknown last seen. She reported that she was unsure of psychiatric diagnosis and that he struggled with alcohol use. No known suicide attempts in the past. Hospitalized multiple times in the past.    [facility] ED team confirmed that patient came with wristband from another hospital. [facility] confirmed that patient was seen yesterday on 1/4/22 for similar complaint, possibly intoxicated at that time, reported suicidality and hearing CAH, and discharged the same day as he was determined not to require inpatient admission.    FHX: alcohol use d/o both parents Currently pt is stable and asymptomatic. No report of suicidal ideation, homicidal ideation, paranoia, hallucinations, depression, or bina. Team strongly suspects his symptoms throughout the admission were feigned in order to gain shelter.     From Initial assessment: Patient is a 61-year-old male, street homeless, , history of alcohol use d/o, other substance use (cocaine, cannabis), ?psychotic disorder, frequent visits to emergency room with substance-induced complaint who presented himself to the ED with complaint of command AH and SI with intent and plan.     Patient related that he started hearing auditory hallucinations 1 week ago which have since worsened. He says, "the voices tell me to go jump in front of a train." He was unable to articulate reasons for his current presentation and why he came today and not before. He denied visiting other ED's recently, brought himself in to the hospital and requested that team "admit me because I'm suicidal." He endorsed daily alcohol, cocaine, and cannabis usage. He states that he is street homeless, occasionally stays with mother in [place]. He reports having [facility] but cannot name psychiatrist and states last on seroquel 300mg qhs and trazodone 100mg up to month ago. He states that he has had low mood, insomnia, low energy for over 1 week. He states poor concentration. He did not endorse any VH.    Called mother at [phone] danie Dorantes who related that patient does not live with her in [place]. Unknown last seen. She reported that she was unsure of psychiatric diagnosis and that he struggled with alcohol use. No known suicide attempts in the past. Hospitalized multiple times in the past.    [facility] ED team confirmed that patient came with wristband from another hospital. [facility] confirmed that patient was seen yesterday on 1/4/22 for similar complaint, possibly intoxicated at that time, reported suicidality and hearing CAH, and discharged the same day as he was determined not to require inpatient admission.    FHX: alcohol use d/o both parents

## 2022-01-14 NOTE — BH INPATIENT PSYCHIATRY DISCHARGE NOTE - NSDCPROCEDURESFT_PSY_ALL_CORE
COVID-19 PCR: Detected (10 Blayne 2022 14:20)  COVID-19 PCR: Negative (05 Jan 2022 13:05)     COVID-19 PCR: Detected (10 Blayne 2022 14:20)  COVID-19 PCR: Negative (05 Jan 2022 13:05)

## 2022-01-14 NOTE — BH TREATMENT PLAN - NSTXCOPEINTERRN_PSY_ALL_CORE
Encourage patient to adhere with medications and treatment. Encourage patient to attend groups and verbalize his feelings and concerns. Help patient identify coping strategies that have worked in the past and support the use of these strategies.
Encourage patient to attend groups to help build coping skills

## 2022-01-14 NOTE — BH INPATIENT PSYCHIATRY DISCHARGE NOTE - OTHER PAST PSYCHIATRIC HISTORY (INCLUDE DETAILS REGARDING ONSET, COURSE OF ILLNESS, INPATIENT/OUTPATIENT TREATMENT)
Per chart review the patient has multiple past presentations to emergency rooms, at least 2 prior IP hospitalizations with complaints of A/H and substance abuse.

## 2022-01-14 NOTE — BH TREATMENT PLAN - NSTXPSYCHOINTERMD_PSY_ALL_CORE
Seroquel for AH; Trazodone for sleep; monitor for withdrawal. 
Seroquel for AH; Trazodone for sleep; monitor for withdrawal.

## 2022-01-14 NOTE — BH INPATIENT PSYCHIATRY PROGRESS NOTE - NSBHCHARTREVIEWVS_PSY_A_CORE FT
Vital Signs Last 24 Hrs  T(C): 36.5 (01-14-22 @ 09:03), Max: 36.8 (01-13-22 @ 15:53)  T(F): 97.7 (01-14-22 @ 09:03), Max: 98.3 (01-13-22 @ 15:53)  HR: 101 (01-14-22 @ 09:03) (98 - 101)  BP: 128/88 (01-14-22 @ 09:03) (128/88 - 150/79)  BP(mean): --  RR: 20 (01-14-22 @ 09:03) (18 - 20)  SpO2: 99% (01-14-22 @ 09:03) (98% - 99%)     Vital Signs Last 24 Hrs  T(C): 36.5 (01-14-22 @ 09:03), Max: 36.5 (01-14-22 @ 09:03)  T(F): 97.7 (01-14-22 @ 09:03), Max: 97.7 (01-14-22 @ 09:03)  HR: 101 (01-14-22 @ 09:03) (101 - 101)  BP: 128/88 (01-14-22 @ 09:03) (128/88 - 128/88)  BP(mean): --  RR: 20 (01-14-22 @ 09:03) (20 - 20)  SpO2: 99% (01-14-22 @ 09:03) (99% - 99%)

## 2022-01-14 NOTE — BH TREATMENT PLAN - NSTXCOPEINTERPR_PSY_ALL_CORE
Pt. will be encouraged to engage in therapeutic activities
Pt will continue to be invited and encouraged to participate in therapeutic activities, currently being offered individually due to COVID-19 precautions

## 2022-01-14 NOTE — BH INPATIENT PSYCHIATRY PROGRESS NOTE - OTHER
Mild poverty Improved affect, euthymic [place] thinking but linear Significant bruxism Slight imbalance to gait but otherwise not ataxic Oddly related but improved

## 2022-01-14 NOTE — BH INPATIENT PSYCHIATRY DISCHARGE NOTE - NSDCCPCAREPLAN_GEN_ALL_CORE_FT
PRINCIPAL DISCHARGE DIAGNOSIS  Diagnosis: Schizoaffective disorder, bipolar type  Assessment and Plan of Treatment:       SECONDARY DISCHARGE DIAGNOSES  Diagnosis: Substance use disorder  Assessment and Plan of Treatment:      PRINCIPAL DISCHARGE DIAGNOSIS  Diagnosis: Schizoaffective disorder, bipolar type  Assessment and Plan of Treatment:       SECONDARY DISCHARGE DIAGNOSES  Diagnosis: Substance use disorder  Assessment and Plan of Treatment:     Diagnosis: Malingering  Assessment and Plan of Treatment:

## 2022-01-14 NOTE — BH TREATMENT PLAN - NSTXALCDRGINTERRN_PSY_ALL_CORE
Patient will be able to indentify proper use for PRN medications for withdrawal and 2 coping skills for withdrawal
Monitor Pt for S/S of withdrowal.

## 2022-01-14 NOTE — BH INPATIENT PSYCHIATRY DISCHARGE NOTE - REASON FOR ADMISSION
Patient presented to the hospital due to command auditory hallucinations telling him to kill himself. He also endorsed suicidality with plan and intent to jump in front of a train.

## 2022-01-14 NOTE — BH INPATIENT PSYCHIATRY PROGRESS NOTE - NSBHMETABOLIC_PSY_ALL_CORE_FT
BMI: BMI (kg/m2): 26.5 (01-05-22 @ 20:20)  HbA1c:   Glucose: POCT Blood Glucose.: 117 mg/dL (12-11-21 @ 22:28)    BP: 128/88 (01-14-22 @ 09:03) (121/63 - 150/79)  Lipid Panel:

## 2022-01-15 RX ADMIN — QUETIAPINE FUMARATE 400 MILLIGRAM(S): 200 TABLET, FILM COATED ORAL at 21:56

## 2022-01-15 RX ADMIN — Medication 1 MILLIGRAM(S): at 09:55

## 2022-01-15 RX ADMIN — Medication 1 TABLET(S): at 09:56

## 2022-01-15 RX ADMIN — Medication 2 MILLIGRAM(S): at 22:26

## 2022-01-15 RX ADMIN — Medication 100 MILLIGRAM(S): at 21:56

## 2022-01-16 PROCEDURE — 99232 SBSQ HOSP IP/OBS MODERATE 35: CPT

## 2022-01-16 RX ADMIN — QUETIAPINE FUMARATE 400 MILLIGRAM(S): 200 TABLET, FILM COATED ORAL at 22:55

## 2022-01-16 RX ADMIN — Medication 100 MILLIGRAM(S): at 22:55

## 2022-01-16 RX ADMIN — Medication 1 TABLET(S): at 11:09

## 2022-01-16 RX ADMIN — Medication 1 MILLIGRAM(S): at 11:09

## 2022-01-16 RX ADMIN — Medication 2 MILLIGRAM(S): at 11:09

## 2022-01-16 NOTE — BH INPATIENT PSYCHIATRY PROGRESS NOTE - NSBHMETABOLIC_PSY_ALL_CORE_FT
BMI: BMI (kg/m2): 26.5 (01-05-22 @ 20:20)  HbA1c:   Glucose: POCT Blood Glucose.: 117 mg/dL (12-11-21 @ 22:28)    BP: 145/91 (01-16-22 @ 09:00) (128/88 - 163/80)  Lipid Panel:

## 2022-01-16 NOTE — BH INPATIENT PSYCHIATRY PROGRESS NOTE - NSBHFUPINTERVALHXFT_PSY_A_CORE
Taking medication. States he is hearing voices he can't identify to kill self. Denies he would harm self in hospital. Denies med side effects.

## 2022-01-16 NOTE — BH INPATIENT PSYCHIATRY PROGRESS NOTE - NSBHCHARTREVIEWVS_PSY_A_CORE FT
Vital Signs Last 24 Hrs  T(C): 36.7 (01-16-22 @ 09:00), Max: 36.7 (01-16-22 @ 09:00)  T(F): 98.1 (01-16-22 @ 09:00), Max: 98.1 (01-16-22 @ 09:00)  HR: 72 (01-16-22 @ 09:00) (71 - 72)  BP: 145/91 (01-16-22 @ 09:00) (145/91 - 163/80)  BP(mean): --  RR: 18 (01-16-22 @ 09:00) (18 - 18)  SpO2: 98% (01-16-22 @ 09:00) (98% - 99%)

## 2022-01-16 NOTE — BH INPATIENT PSYCHIATRY PROGRESS NOTE - OTHER
Significant bruxism impaired by AH Mild poverty Voice telling him to overdose on pills or jump in front of a train Steady at present [place] thinking but linear

## 2022-01-16 NOTE — BH INPATIENT PSYCHIATRY PROGRESS NOTE - NSBHASSESSSUMMFT_PSY_ALL_CORE
1/7: Despite taking a few doses of quetiapine and having increased temporal distance from last use of substances, the patient reported having worsening of his CAH and continues to endorse active suicidal ideation with plan and intent to jump in front of a train. He also complains of depressed mood and insomnia. Reported having continued withdrawal symptoms though vitals do not show any autonomic instability, he does not appear tremulous on exam, and maintains attention though was not oriented to place. Affect remains constricted with some anxiety. Writer does have some suspicion for some cognitive impairment given his lack of orientation to place, statement of his age being 64, and denying any alcohol use prior to this past year. Interestingly, he did not remember that writer is one of his doctors. No nystagmus or ataxic gait on exam, did not appear to have any confabulation. MOCA results described below. However, even if there is an active neurocognitive issue, he has shown himself capable of navigating the health care system and did not present as either cachectic or particularly disheveled when he came to the hospital, indicating ability to care for self. Will change quetiapine to 200 mg at night given his inconsistent adherence. Trazodone will be made prn at 100 mg at night for insomnia. Remains unsafe for discharge given continued suicidality.     MOCA was conducted, with score of 16/30 (extra point given due to highest level of ed being 10th grade). He was unable to complete trail, copy cube, name rhino or camel. On registration he consistently could not remember first 2 items on both trials. He was unable to state digits in forward order, only remembered first, 4th and 5th digits. He could not maintain attention through list of letters, could not subtract 7's more than once, not able to name more than 1 word w/ F, did not have similarity of watch and ruler. He got none of the words for delayed recall, and only face and red from mult choice cues. He was fully oriented. His effort was poor overall, so it is unclear if this is a fully valid assessment.     ;;01/09: more social than in past; still complains of AH commanding self harm.  Alert; oriented; cognition intact; speech clear; no tremor or evidence of movement impairment.  increasing Seroquel to 300mg at night (last admission here left on 600-700mg at night)  ;;01/10: continues guarded and evasive only briskly speaking of command AH ; accepting does increase of Seroquel.  May need to add antihypertensive.   Current medications acetaminophen     Tablet .. 650 milliGRAM(s) Oral every 6 hours PRN  aluminum hydroxide/magnesium hydroxide/simethicone Suspension 30 milliLiter(s) Oral every 4 hours PRN  folic acid 1 milliGRAM(s) Oral daily  haloperidol     Tablet 5 milliGRAM(s) Oral every 6 hours PRN  LORazepam     Tablet 2 milliGRAM(s) Oral every 6 hours PRN  LORazepam     Tablet 2 milliGRAM(s) Oral every 2 hours PRN  magnesium hydroxide Suspension 30 milliLiter(s) Oral daily PRN  multivitamin 1 Tablet(s) Oral daily  nicotine  Polacrilex Gum 2 milliGRAM(s) Oral every 2 hours PRN  QUEtiapine 300 milliGRAM(s) Oral at bedtime to be raised to 400mg at night;   traZODone 100 milliGRAM(s) Oral at bedtime PRN    1/10: in bed; avoidant; Isolative; stays in bed; makes minimal eye contact; not conversational.  Immediately states "I am hearing voices telling me to kill myself" and won't elaborate beyond that but does acknowledge  Sleep  appetite ok; no pain issues.  Mood anxious.  avoids eye contact. Accepting Seroquel dose increase.  bp continues elevated; may need to consult medicine.    ;;01/11: patient is COVID positive; says little to the writer; poor eye contact; accepting remaingi in room in isolation area ;Seroquel to remain at 300mg at night for a couple of nights ; repeat COVID test on 01/13.  ;;01/12: AH persists; patient states they started when he was 24 years old; relation to substance use?; patient maintaining quaranteen; raising Seroquel to 400mg at night for psychosis.   ;;01/13: some evidence of improvement.  No longer has SI; denies AH but still somewhat preoccupied but better eye contact and less irritability; interested in transition to COVID+ hotel.  fair adls.   1/14: Adherent to meds, no events overnight. Continues to deny any SI, denies AH and VH as well, no HI. He has likely reached maximum level of benefit from inpatient hospitalization, working on dispo at this time.   Today patient is again reporting command AH to kill self with no intent in hospital

## 2022-01-17 RX ADMIN — Medication 1 TABLET(S): at 10:08

## 2022-01-17 RX ADMIN — Medication 2 MILLIGRAM(S): at 21:59

## 2022-01-17 RX ADMIN — Medication 2 MILLIGRAM(S): at 10:08

## 2022-01-17 RX ADMIN — Medication 1 MILLIGRAM(S): at 10:08

## 2022-01-17 RX ADMIN — Medication 100 MILLIGRAM(S): at 21:58

## 2022-01-17 RX ADMIN — QUETIAPINE FUMARATE 400 MILLIGRAM(S): 200 TABLET, FILM COATED ORAL at 21:59

## 2022-01-18 PROCEDURE — 99233 SBSQ HOSP IP/OBS HIGH 50: CPT

## 2022-01-18 RX ADMIN — Medication 2 MILLIGRAM(S): at 09:41

## 2022-01-18 RX ADMIN — Medication 1 TABLET(S): at 09:20

## 2022-01-18 RX ADMIN — Medication 100 MILLIGRAM(S): at 21:47

## 2022-01-18 RX ADMIN — Medication 1 MILLIGRAM(S): at 09:20

## 2022-01-18 RX ADMIN — Medication 2 MILLIGRAM(S): at 21:47

## 2022-01-18 RX ADMIN — QUETIAPINE FUMARATE 400 MILLIGRAM(S): 200 TABLET, FILM COATED ORAL at 21:47

## 2022-01-18 NOTE — BH INPATIENT PSYCHIATRY PROGRESS NOTE - CURRENT MEDICATION
MEDICATIONS  (STANDING):  folic acid 1 milliGRAM(s) Oral daily  multivitamin 1 Tablet(s) Oral daily  QUEtiapine 400 milliGRAM(s) Oral at bedtime    MEDICATIONS  (PRN):  acetaminophen     Tablet .. 650 milliGRAM(s) Oral every 6 hours PRN Moderate Pain (4 - 6)  aluminum hydroxide/magnesium hydroxide/simethicone Suspension 30 milliLiter(s) Oral every 4 hours PRN Dyspepsia  haloperidol     Tablet 5 milliGRAM(s) Oral every 6 hours PRN agitation  magnesium hydroxide Suspension 30 milliLiter(s) Oral daily PRN Constipation  nicotine  Polacrilex Gum 2 milliGRAM(s) Oral every 2 hours PRN cravings  traZODone 100 milliGRAM(s) Oral at bedtime PRN insomnia

## 2022-01-18 NOTE — BH INPATIENT PSYCHIATRY PROGRESS NOTE - NSBHASSESSSUMMFT_PSY_ALL_CORE
1/7: Despite taking a few doses of quetiapine and having increased temporal distance from last use of substances, the patient reported having worsening of his CAH and continues to endorse active suicidal ideation with plan and intent to jump in front of a train. He also complains of depressed mood and insomnia. Reported having continued withdrawal symptoms though vitals do not show any autonomic instability, he does not appear tremulous on exam, and maintains attention though was not oriented to place. Affect remains constricted with some anxiety. Writer does have some suspicion for some cognitive impairment given his lack of orientation to place, statement of his age being 64, and denying any alcohol use prior to this past year. Interestingly, he did not remember that writer is one of his doctors. No nystagmus or ataxic gait on exam, did not appear to have any confabulation. MOCA results described below. However, even if there is an active neurocognitive issue, he has shown himself capable of navigating the health care system and did not present as either cachectic or particularly disheveled when he came to the hospital, indicating ability to care for self. Will change quetiapine to 200 mg at night given his inconsistent adherence. Trazodone will be made prn at 100 mg at night for insomnia. Remains unsafe for discharge given continued suicidality.     MOCA was conducted, with score of 16/30 (extra point given due to highest level of ed being 10th grade). He was unable to complete trail, copy cube, name rhino or camel. On registration he consistently could not remember first 2 items on both trials. He was unable to state digits in forward order, only remembered first, 4th and 5th digits. He could not maintain attention through list of letters, could not subtract 7's more than once, not able to name more than 1 word w/ F, did not have similarity of watch and ruler. He got none of the words for delayed recall, and only face and red from mult choice cues. He was fully oriented. His effort was poor overall, so it is unclear if this is a fully valid assessment.     ;;01/09: more social than in past; still complains of AH commanding self harm.  Alert; oriented; cognition intact; speech clear; no tremor or evidence of movement impairment.  increasing Seroquel to 300mg at night (last admission here left on 600-700mg at night)  ;;01/10: continues guarded and evasive only briskly speaking of command AH ; accepting does increase of Seroquel.  May need to add antihypertensive.   Current medications acetaminophen     Tablet .. 650 milliGRAM(s) Oral every 6 hours PRN  aluminum hydroxide/magnesium hydroxide/simethicone Suspension 30 milliLiter(s) Oral every 4 hours PRN  folic acid 1 milliGRAM(s) Oral daily  haloperidol     Tablet 5 milliGRAM(s) Oral every 6 hours PRN  LORazepam     Tablet 2 milliGRAM(s) Oral every 6 hours PRN  LORazepam     Tablet 2 milliGRAM(s) Oral every 2 hours PRN  magnesium hydroxide Suspension 30 milliLiter(s) Oral daily PRN  multivitamin 1 Tablet(s) Oral daily  nicotine  Polacrilex Gum 2 milliGRAM(s) Oral every 2 hours PRN  QUEtiapine 300 milliGRAM(s) Oral at bedtime to be raised to 400mg at night;   traZODone 100 milliGRAM(s) Oral at bedtime PRN    1/10: in bed; avoidant; Isolative; stays in bed; makes minimal eye contact; not conversational.  Immediately states "I am hearing voices telling me to kill myself" and won't elaborate beyond that but does acknowledge  Sleep  appetite ok; no pain issues.  Mood anxious.  avoids eye contact. Accepting Seroquel dose increase.  bp continues elevated; may need to consult medicine.    ;;01/11: patient is COVID positive; says little to the writer; poor eye contact; accepting remaingi in room in isolation area ;Seroquel to remain at 300mg at night for a couple of nights ; repeat COVID test on 01/13.  ;;01/12: AH persists; patient states they started when he was 24 years old; relation to substance use?; patient maintaining quaranteen; raising Seroquel to 400mg at night for psychosis.   ;;01/13: some evidence of improvement.  No longer has SI; denies AH but still somewhat preoccupied but better eye contact and less irritability; interested in transition to COVID+ hotel.  fair adls.   1/14: Adherent to meds, no events overnight. Continues to deny any SI, denies AH and VH as well, no HI. He has likely reached maximum level of benefit from inpatient hospitalization, working on dispo at this time.   Today patient is again reporting command AH to kill self with no intent in hospital  1/18: Adherent to meds, no events overnight. Patient no longer endorses any SI or CAH, and requests discharge. Should be able to be discharged tomorrow as he is psychiatrically cleared and is currently no longer a threat to himself. Also is 10 days since his positive covid test and is currently asymptomatic.

## 2022-01-18 NOTE — BH INPATIENT PSYCHIATRY PROGRESS NOTE - PRN MEDS
MEDICATIONS  (PRN):  acetaminophen     Tablet .. 650 milliGRAM(s) Oral every 6 hours PRN Moderate Pain (4 - 6)  aluminum hydroxide/magnesium hydroxide/simethicone Suspension 30 milliLiter(s) Oral every 4 hours PRN Dyspepsia  haloperidol     Tablet 5 milliGRAM(s) Oral every 6 hours PRN agitation  magnesium hydroxide Suspension 30 milliLiter(s) Oral daily PRN Constipation  nicotine  Polacrilex Gum 2 milliGRAM(s) Oral every 2 hours PRN cravings  traZODone 100 milliGRAM(s) Oral at bedtime PRN insomnia

## 2022-01-18 NOTE — BH INPATIENT PSYCHIATRY PROGRESS NOTE - NSBHFUPINTERVALHXFT_PSY_A_CORE
Adherent to medications, no events overnight. Denied hearing any voices, denied any suicidality and wants to be discharged back to shelter.

## 2022-01-18 NOTE — BH INPATIENT PSYCHIATRY PROGRESS NOTE - OTHER
Voice telling him to overdose on pills or jump in front of a train Mild poverty [place] thinking but linear impaired by AH Significant bruxism Steady at present

## 2022-01-18 NOTE — BH INPATIENT PSYCHIATRY PROGRESS NOTE - NSBHCHARTREVIEWVS_PSY_A_CORE FT
Vital Signs Last 24 Hrs  T(C): 36.7 (01-17-22 @ 16:16), Max: 36.7 (01-17-22 @ 16:16)  T(F): 98 (01-17-22 @ 16:16), Max: 98 (01-17-22 @ 16:16)  HR: 87 (01-17-22 @ 16:16) (80 - 87)  BP: 137/78 (01-17-22 @ 16:16) (133/88 - 137/78)  BP(mean): --  RR: 18 (01-17-22 @ 16:16) (18 - 18)  SpO2: 98% (01-17-22 @ 16:16) (98% - 98%)     Vital Signs Last 24 Hrs  T(C): 36.3 (01-18-22 @ 09:00), Max: 36.7 (01-17-22 @ 16:16)  T(F): 97.4 (01-18-22 @ 09:00), Max: 98 (01-17-22 @ 16:16)  HR: 91 (01-18-22 @ 09:00) (87 - 91)  BP: 141/86 (01-18-22 @ 09:00) (137/78 - 141/86)  BP(mean): --  RR: 18 (01-18-22 @ 09:00) (18 - 18)  SpO2: 96% (01-18-22 @ 09:00) (96% - 98%)     Vital Signs Last 24 Hrs  T(C): 36.6 (01-18-22 @ 16:19), Max: 36.6 (01-18-22 @ 16:19)  T(F): 97.9 (01-18-22 @ 16:19), Max: 97.9 (01-18-22 @ 16:19)  HR: 81 (01-18-22 @ 16:19) (81 - 91)  BP: 138/82 (01-18-22 @ 16:19) (138/82 - 141/86)  BP(mean): --  RR: 18 (01-18-22 @ 09:00) (18 - 18)  SpO2: 97% (01-18-22 @ 16:19) (96% - 97%)

## 2022-01-18 NOTE — BH INPATIENT PSYCHIATRY PROGRESS NOTE - NSBHMETABOLIC_PSY_ALL_CORE_FT
BMI: BMI (kg/m2): 26.5 (01-05-22 @ 20:20)  HbA1c:   Glucose: POCT Blood Glucose.: 117 mg/dL (12-11-21 @ 22:28)    BP: 137/78 (01-17-22 @ 16:16) (133/88 - 163/80)  Lipid Panel:  BMI: BMI (kg/m2): 26.5 (01-05-22 @ 20:20)  HbA1c:   Glucose: POCT Blood Glucose.: 117 mg/dL (12-11-21 @ 22:28)    BP: 141/86 (01-18-22 @ 09:00) (133/88 - 163/80)  Lipid Panel:  BMI: BMI (kg/m2): 26.5 (01-05-22 @ 20:20)  HbA1c:   Glucose: POCT Blood Glucose.: 117 mg/dL (12-11-21 @ 22:28)    BP: 138/82 (01-18-22 @ 16:19) (133/88 - 161/94)  Lipid Panel:

## 2022-01-19 VITALS
RESPIRATION RATE: 18 BRPM | HEART RATE: 80 BPM | SYSTOLIC BLOOD PRESSURE: 129 MMHG | OXYGEN SATURATION: 97 % | TEMPERATURE: 97 F | DIASTOLIC BLOOD PRESSURE: 81 MMHG

## 2022-01-19 PROCEDURE — 80307 DRUG TEST PRSMV CHEM ANLYZR: CPT

## 2022-01-19 PROCEDURE — 93005 ELECTROCARDIOGRAM TRACING: CPT

## 2022-01-19 PROCEDURE — 85027 COMPLETE CBC AUTOMATED: CPT

## 2022-01-19 PROCEDURE — U0003: CPT

## 2022-01-19 PROCEDURE — 87635 SARS-COV-2 COVID-19 AMP PRB: CPT

## 2022-01-19 PROCEDURE — 99233 SBSQ HOSP IP/OBS HIGH 50: CPT

## 2022-01-19 PROCEDURE — 99285 EMERGENCY DEPT VISIT HI MDM: CPT | Mod: 25

## 2022-01-19 PROCEDURE — U0005: CPT

## 2022-01-19 PROCEDURE — 80053 COMPREHEN METABOLIC PANEL: CPT

## 2022-01-19 PROCEDURE — 36415 COLL VENOUS BLD VENIPUNCTURE: CPT

## 2022-01-19 PROCEDURE — 81003 URINALYSIS AUTO W/O SCOPE: CPT

## 2022-01-19 RX ADMIN — Medication 1 MILLIGRAM(S): at 11:25

## 2022-01-19 RX ADMIN — Medication 1 TABLET(S): at 11:25

## 2022-01-19 NOTE — BH INPATIENT PSYCHIATRY PROGRESS NOTE - NSTXPSYCHODATETRGT_PSY_ALL_CORE
13-Jan-2022
21-Jan-2022
13-Jan-2022
21-Jan-2022
13-Jan-2022
21-Jan-2022
13-Jan-2022
13-Jan-2022
21-Jan-2022

## 2022-01-19 NOTE — BH INPATIENT PSYCHIATRY PROGRESS NOTE - NSICDXBHSECONDARYDX_PSY_ALL_CORE
Substance use disorder   F19.90  Ot psychoactv substance depend w psych disorder w hallucin   F19.498  
Substance use disorder   F19.90  Ot psychoactv substance depend w psych disorder w hallucin   F19.606  
Substance use disorder   F19.90  Ot psychoactv substance depend w psych disorder w hallucin   F19.550  
Substance use disorder   F19.90  Ot psychoactv substance depend w psych disorder w hallucin   F19.621  
Substance use disorder   F19.90  Ot psychoactv substance depend w psych disorder w hallucin   F19.613  
Substance use disorder   F19.90  Ot psychoactv substance depend w psych disorder w hallucin   F19.997  
Substance use disorder   F19.90  Ot psychoactv substance depend w psych disorder w hallucin   F19.795  
Substance use disorder   F19.90  Ot psychoactv substance depend w psych disorder w hallucin   F19.880  
Substance use disorder   F19.90  Ot psychoactv substance depend w psych disorder w hallucin   F19.055  
Substance use disorder   F19.90  Ot psychoactv substance depend w psych disorder w hallucin   F19.677

## 2022-01-19 NOTE — BH INPATIENT PSYCHIATRY PROGRESS NOTE - NSTXPSYCHOGOAL_PSY_ALL_CORE
Will report hearing a voice only momentarily a few times a day instead of all day
Other...
Other...
Will report hearing a voice only momentarily a few times a day instead of all day
Other...
Will be able to report experiencing hallucinations to staff
Will be able to report experiencing hallucinations to staff
Will report hearing a voice only momentarily a few times a day instead of all day
Other...
Will be able to report experiencing hallucinations to staff
Will report hearing a voice only momentarily a few times a day instead of all day

## 2022-01-19 NOTE — BH INPATIENT PSYCHIATRY PROGRESS NOTE - NSBHASSESSSUMMFT_PSY_ALL_CORE
1/7: Despite taking a few doses of quetiapine and having increased temporal distance from last use of substances, the patient reported having worsening of his CAH and continues to endorse active suicidal ideation with plan and intent to jump in front of a train. He also complains of depressed mood and insomnia. Reported having continued withdrawal symptoms though vitals do not show any autonomic instability, he does not appear tremulous on exam, and maintains attention though was not oriented to place. Affect remains constricted with some anxiety. Writer does have some suspicion for some cognitive impairment given his lack of orientation to place, statement of his age being 64, and denying any alcohol use prior to this past year. Interestingly, he did not remember that writer is one of his doctors. No nystagmus or ataxic gait on exam, did not appear to have any confabulation. MOCA results described below. However, even if there is an active neurocognitive issue, he has shown himself capable of navigating the health care system and did not present as either cachectic or particularly disheveled when he came to the hospital, indicating ability to care for self. Will change quetiapine to 200 mg at night given his inconsistent adherence. Trazodone will be made prn at 100 mg at night for insomnia. Remains unsafe for discharge given continued suicidality.     MOCA was conducted, with score of 16/30 (extra point given due to highest level of ed being 10th grade). He was unable to complete trail, copy cube, name rhino or camel. On registration he consistently could not remember first 2 items on both trials. He was unable to state digits in forward order, only remembered first, 4th and 5th digits. He could not maintain attention through list of letters, could not subtract 7's more than once, not able to name more than 1 word w/ F, did not have similarity of watch and ruler. He got none of the words for delayed recall, and only face and red from mult choice cues. He was fully oriented. His effort was poor overall, so it is unclear if this is a fully valid assessment.     ;;01/09: more social than in past; still complains of AH commanding self harm.  Alert; oriented; cognition intact; speech clear; no tremor or evidence of movement impairment.  increasing Seroquel to 300mg at night (last admission here left on 600-700mg at night)  ;;01/10: continues guarded and evasive only briskly speaking of command AH ; accepting does increase of Seroquel.  May need to add antihypertensive.   Current medications acetaminophen     Tablet .. 650 milliGRAM(s) Oral every 6 hours PRN  aluminum hydroxide/magnesium hydroxide/simethicone Suspension 30 milliLiter(s) Oral every 4 hours PRN  folic acid 1 milliGRAM(s) Oral daily  haloperidol     Tablet 5 milliGRAM(s) Oral every 6 hours PRN  LORazepam     Tablet 2 milliGRAM(s) Oral every 6 hours PRN  LORazepam     Tablet 2 milliGRAM(s) Oral every 2 hours PRN  magnesium hydroxide Suspension 30 milliLiter(s) Oral daily PRN  multivitamin 1 Tablet(s) Oral daily  nicotine  Polacrilex Gum 2 milliGRAM(s) Oral every 2 hours PRN  QUEtiapine 300 milliGRAM(s) Oral at bedtime to be raised to 400mg at night;   traZODone 100 milliGRAM(s) Oral at bedtime PRN    1/10: in bed; avoidant; Isolative; stays in bed; makes minimal eye contact; not conversational.  Immediately states "I am hearing voices telling me to kill myself" and won't elaborate beyond that but does acknowledge  Sleep  appetite ok; no pain issues.  Mood anxious.  avoids eye contact. Accepting Seroquel dose increase.  bp continues elevated; may need to consult medicine.    ;;01/11: patient is COVID positive; says little to the writer; poor eye contact; accepting remaingi in room in isolation area ;Seroquel to remain at 300mg at night for a couple of nights ; repeat COVID test on 01/13.  ;;01/12: AH persists; patient states they started when he was 24 years old; relation to substance use?; patient maintaining quaranteen; raising Seroquel to 400mg at night for psychosis.   ;;01/13: some evidence of improvement.  No longer has SI; denies AH but still somewhat preoccupied but better eye contact and less irritability; interested in transition to COVID+ hotel.  fair adls.   1/14: Adherent to meds, no events overnight. Continues to deny any SI, denies AH and VH as well, no HI. He has likely reached maximum level of benefit from inpatient hospitalization, working on dispo at this time.   Today patient is again reporting command AH to kill self with no intent in hospital  1/18: Adherent to meds, no events overnight. Patient no longer endorses any SI or CAH, and requests discharge. Should be able to be discharged tomorrow as he is psychiatrically cleared and is currently no longer a threat to himself. Also is 10 days since his positive covid test and is currently asymptomatic.   1/19: Adherent to meds, no events overnight. Continues to deny any SI/CAH/HI/VH and is cleared psychiatrically. He will be discharged back to shelter today and has outpatient follow up scheduled.

## 2022-01-19 NOTE — BH INPATIENT PSYCHIATRY PROGRESS NOTE - NSTXCOPEGOAL_PSY_ALL_CORE
Other...
Identify and utilize 2 coping skills that meet their needs
Identify and utilize 2 coping skills that meet their needs
Other...
Identify and utilize 2 coping skills that meet their needs
Other...
Identify and utilize 2 coping skills that meet their needs

## 2022-01-19 NOTE — BH INPATIENT PSYCHIATRY PROGRESS NOTE - NSBHFUPINTERVALHXFT_PSY_A_CORE
Adherent to medications, no events overnight. Feeling well, denying any voices, ready for discharge.

## 2022-01-19 NOTE — BH INPATIENT PSYCHIATRY PROGRESS NOTE - NSBHMETABOLIC_PSY_ALL_CORE_FT
BMI: BMI (kg/m2): 26.5 (01-05-22 @ 20:20)  HbA1c:   Glucose: POCT Blood Glucose.: 117 mg/dL (12-11-21 @ 22:28)    BP: 138/82 (01-18-22 @ 16:19) (133/88 - 161/94)  Lipid Panel:  BMI: BMI (kg/m2): 26.5 (01-05-22 @ 20:20)  HbA1c:   Glucose: POCT Blood Glucose.: 117 mg/dL (12-11-21 @ 22:28)    BP: 129/81 (01-19-22 @ 08:45) (129/81 - 161/94)  Lipid Panel:

## 2022-01-19 NOTE — BH INPATIENT PSYCHIATRY PROGRESS NOTE - NSTXALCDRGINTERMD_PSY_ALL_CORE
Trazodone Seroquel [facility] monitor for withdrawal 
Trazodone Seroquel [facility] monitor for withdrawal 
Trazodone Seroquel [facility] monitor for withdrawal 
Trazodone Seroquel [facility] monitor for withdrawal 
Trazodone Seroquel [facility] monitor for withdrawal 
Trazodone Seroquel [facility] monitor for withdrawal 
Trazodone Seroquel [facility] monitor for withdrawal 
Trazodone Seroquel [facility] monitor for withdrawal 
Trazodone Seroquel [facility] monitor for withdrawal 
Trazodone Seroquel [facility] monitor for withdrawal 
Trazodone Seroquel [facility] monitor for withdrawal

## 2022-01-19 NOTE — BH INPATIENT PSYCHIATRY PROGRESS NOTE - NSBHINPTBILLING_PSY_ALL_CORE
83015 - Inpatient Moderate Complexity
58040 - Inpatient Moderate Complexity
36233 - Inpatient Low Complexity
32911 - Inpatient Moderate Complexity
49211 - Inpatient Moderate Complexity
35552 - Inpatient Low Complexity
40723 - Inpatient Moderate Complexity
39256 - Inpatient Moderate Complexity
62673 - Inpatient High Complexity
40828 - Hospital Discharge Day Management; more than 30 min
83204 - Inpatient Low Complexity

## 2022-01-19 NOTE — BH INPATIENT PSYCHIATRY PROGRESS NOTE - NSTXPSYCHODATEEST_PSY_ALL_CORE
06-Jan-2022
14-Jan-2022
06-Jan-2022
14-Jan-2022

## 2022-01-19 NOTE — BH INPATIENT PSYCHIATRY PROGRESS NOTE - MODIFICATIONS
raise Seroquel to 400mg at night ; retest for COVID on 1/13.  Monitor behavior and AH.   Continue encouraging patient through period of quaranteen
keep Seroquel at 300mg at night; retest for COVID on 1/13.  Monitor behavior and AH. 
Plan reviewed
Patient can be discharged on current medication.  Patient has had his period of quarantine.
Plan reviewed
patient continues to deny SI or AH;  transition to aftercare on current meds.

## 2022-01-19 NOTE — BH INPATIENT PSYCHIATRY PROGRESS NOTE - NSBHCHARTREVIEWVS_PSY_A_CORE FT
Vital Signs Last 24 Hrs  T(C): 36.6 (01-18-22 @ 16:19), Max: 36.6 (01-18-22 @ 16:19)  T(F): 97.9 (01-18-22 @ 16:19), Max: 97.9 (01-18-22 @ 16:19)  HR: 81 (01-18-22 @ 16:19) (81 - 91)  BP: 138/82 (01-18-22 @ 16:19) (138/82 - 141/86)  BP(mean): --  RR: 17 (01-18-22 @ 16:19) (17 - 18)  SpO2: 97% (01-18-22 @ 16:19) (96% - 97%)     Vital Signs Last 24 Hrs  T(C): 36.3 (01-19-22 @ 08:45), Max: 36.6 (01-18-22 @ 16:19)  T(F): 97.4 (01-19-22 @ 08:45), Max: 97.9 (01-18-22 @ 16:19)  HR: 80 (01-19-22 @ 08:45) (80 - 81)  BP: 129/81 (01-19-22 @ 08:45) (129/81 - 138/82)  BP(mean): --  RR: 18 (01-19-22 @ 08:45) (17 - 18)  SpO2: 97% (01-19-22 @ 08:45) (97% - 97%)

## 2022-01-19 NOTE — BH INPATIENT PSYCHIATRY PROGRESS NOTE - CASE SUMMARY
1/7: Despite taking a few doses of quetiapine and having increased temporal distance from last use of substances, the patient reported having worsening of his CAH and continues to endorse active suicidal ideation with plan and intent to jump in front of a train. He also complains of depressed mood and insomnia. Reported having continued withdrawal symptoms though vitals do not show any autonomic instability, he does not appear tremulous on exam, and maintains attention though was not oriented to place. Affect remains constricted with some anxiety. Writer does have some suspicion for some cognitive impairment given his lack of orientation to place, statement of his age being 64, and denying any alcohol use prior to this past year. Interestingly, he did not remember that writer is one of his doctors. No nystagmus or ataxic gait on exam, did not appear to have any confabulation. MOCA results described below. However, even if there is an active neurocognitive issue, he has shown himself capable of navigating the health care system and did not present as either cachectic or particularly disheveled when he came to the hospital, indicating ability to care for self. Will change quetiapine to 200 mg at night given his inconsistent adherence. Trazodone will be made prn at 100 mg at night for insomnia. Remains unsafe for discharge given continued suicidality.     MOCA was conducted, with score of 16/30 (extra point given due to highest level of ed being 10th grade). He was unable to complete trail, copy cube, name rhino or camel. On registration he consistently could not remember first 2 items on both trials. He was unable to state digits in forward order, only remembered first, 4th and 5th digits. He could not maintain attention through list of letters, could not subtract 7's more than once, not able to name more than 1 word w/ F, did not have similarity of watch and ruler. He got none of the words for delayed recall, and only face and red from mult choice cues. He was fully oriented. His effort was poor overall, so it is unclear if this is a fully valid assessment.     ;;01/09: more social than in past; still complains of AH commanding self harm.  Alert; oriented; cognition intact; speech clear; no tremor or evidence of movement impairment.  increasing Seroquel to 300mg at night (last admission here left on 600-700mg at night)  ;;01/10: continues guarded and evasive only briskly speaking of command AH ; accepting does increase of Seroquel.  May need to add antihypertensive.   Current medications acetaminophen     Tablet .. 650 milliGRAM(s) Oral every 6 hours PRN  aluminum hydroxide/magnesium hydroxide/simethicone Suspension 30 milliLiter(s) Oral every 4 hours PRN  folic acid 1 milliGRAM(s) Oral daily  haloperidol     Tablet 5 milliGRAM(s) Oral every 6 hours PRN  LORazepam     Tablet 2 milliGRAM(s) Oral every 6 hours PRN  LORazepam     Tablet 2 milliGRAM(s) Oral every 2 hours PRN  magnesium hydroxide Suspension 30 milliLiter(s) Oral daily PRN  multivitamin 1 Tablet(s) Oral daily  nicotine  Polacrilex Gum 2 milliGRAM(s) Oral every 2 hours PRN  QUEtiapine 300 milliGRAM(s) Oral at bedtime to be raised to 400mg at night;   traZODone 100 milliGRAM(s) Oral at bedtime PRN    1/10: in bed; avoidant; Isolative; stays in bed; makes minimal eye contact; not conversational.  Immediately states "I am hearing voices telling me to kill myself" and won't elaborate beyond that but does acknowledge  Sleep  appetite ok; no pain issues.  Mood anxious.  avoids eye contact. Accepting Seroquel dose increase.  bp continues elevated; may need to consult medicine.    ;;01/11: patient is COVID positive; says little to the writer; poor eye contact; accepting remaingi in room in isolation area ;Seroquel to remain at 300mg at night for a couple of nights ; repeat COVID test on 01/13.    
1/7: Despite taking a few doses of quetiapine and having increased temporal distance from last use of substances, the patient reported having worsening of his CAH and continues to endorse active suicidal ideation with plan and intent to jump in front of a train. He also complains of depressed mood and insomnia. Reported having continued withdrawal symptoms though vitals do not show any autonomic instability, he does not appear tremulous on exam, and maintains attention though was not oriented to place. Affect remains constricted with some anxiety. Writer does have some suspicion for some cognitive impairment given his lack of orientation to place, statement of his age being 64, and denying any alcohol use prior to this past year. Interestingly, he did not remember that writer is one of his doctors. No nystagmus or ataxic gait on exam, did not appear to have any confabulation. MOCA results described below. However, even if there is an active neurocognitive issue, he has shown himself capable of navigating the health care system and did not present as either cachectic or particularly disheveled when he came to the hospital, indicating ability to care for self. Will change quetiapine to 200 mg at night given his inconsistent adherence. Trazodone will be made prn at 100 mg at night for insomnia. Remains unsafe for discharge given continued suicidality.     MOCA was conducted, with score of 16/30 (extra point given due to highest level of ed being 10th grade). He was unable to complete trail, copy cube, name rhino or camel. On registration he consistently could not remember first 2 items on both trials. He was unable to state digits in forward order, only remembered first, 4th and 5th digits. He could not maintain attention through list of letters, could not subtract 7's more than once, not able to name more than 1 word w/ F, did not have similarity of watch and ruler. He got none of the words for delayed recall, and only face and red from mult choice cues. He was fully oriented. His effort was poor overall, so it is unclear if this is a fully valid assessment.     ;;01/09: more social than in past; still complains of AH commanding self harm.  Alert; oriented; cognition intact; speech clear; no tremor or evidence of movement impairment.  increasing Seroquel to 300mg at night (last admission here left on 600-700mg at night)  ;;01/10: continues guarded and evasive only briskly speaking of command AH ; accepting does increase of Seroquel.  May need to add antihypertensive.   Current medications acetaminophen     Tablet .. 650 milliGRAM(s) Oral every 6 hours PRN  aluminum hydroxide/magnesium hydroxide/simethicone Suspension 30 milliLiter(s) Oral every 4 hours PRN  folic acid 1 milliGRAM(s) Oral daily  haloperidol     Tablet 5 milliGRAM(s) Oral every 6 hours PRN  LORazepam     Tablet 2 milliGRAM(s) Oral every 6 hours PRN  LORazepam     Tablet 2 milliGRAM(s) Oral every 2 hours PRN  magnesium hydroxide Suspension 30 milliLiter(s) Oral daily PRN  multivitamin 1 Tablet(s) Oral daily  nicotine  Polacrilex Gum 2 milliGRAM(s) Oral every 2 hours PRN  QUEtiapine 300 milliGRAM(s) Oral at bedtime to be raised to 400mg at night;   traZODone 100 milliGRAM(s) Oral at bedtime PRN    1/10: in bed; avoidant; Isolative; stays in bed; makes minimal eye contact; not conversational.  Immediately states "I am hearing voices telling me to kill myself" and won't elaborate beyond that but does acknowledge  Sleep  appetite ok; no pain issues.  Mood anxious.  avoids eye contact. Accepting Seroquel dose increase.  bp continues elevated; may need to consult medicine.    ;;01/11: patient is COVID positive; says little to the writer; poor eye contact; accepting remaingi in room in isolation area ;Seroquel to remain at 300mg at night for a couple of nights ; repeat COVID test on 01/13.  ;;01/12: AH persists; patient states they started when he was 24 years old; relation to substance use?; patient maintaining quaranteen; raising Seroquel to 400mg at night for psychosis.   
For discharge today
Admitted for AH commanding suicide; tolerated medication for AH with improvement in mood and AH:  Current medications acetaminophen     Tablet .. 650 milliGRAM(s) Oral every 6 hours PRN  aluminum hydroxide/magnesium hydroxide/simethicone Suspension 30 milliLiter(s) Oral every 4 hours PRN  folic acid 1 milliGRAM(s) Oral daily  haloperidol     Tablet 5 milliGRAM(s) Oral every 6 hours PRN  magnesium hydroxide Suspension 30 milliLiter(s) Oral daily PRN  multivitamin 1 Tablet(s) Oral daily  nicotine  Polacrilex Gum 2 milliGRAM(s) Oral every 2 hours PRN  QUEtiapine 400 milliGRAM(s) Oral at bedtime  traZODone 100 milliGRAM(s) Oral at bedtime PRN    Became COVID positive and was placed in Quaranteen without any significant clinical issues.  COVID-19 PCR: Detected (10 Blayne 2022 14:20)  COVID-19 PCR: Negative (05 Jan 2022 13:05)  COVID-19 PCR: Negative (12 Dec 2021 22:30)  COVID-19 PCR: Negative (09 Sep 2021 08:14)      ;;01/19: Not endorsing  suicidal or homicidal ideation intent or plans; no mention of auditory or visual hallucinations Alert; oriented; cognition intact; speech clear; no tremor or evidence of movement impairment. i&j fair to poor : aware of medications and acknowledges symptoms but not reflective in a meaningful way  on issues that impact on symptoms.  Thinking is congruent with affect; no peculiarities' of thinking or language use.  Fair to good eye contact; speech clear spontaneous and normal volume 
Pt had low MOCA score and may have some cognitive impairment. I have always suspected TBI with him. Pt isolative to room and not always adhering with unit rules. 
1/7: Despite taking a few doses of quetiapine and having increased temporal distance from last use of substances, the patient reported having worsening of his CAH and continues to endorse active suicidal ideation with plan and intent to jump in front of a train. He also complains of depressed mood and insomnia. Reported having continued withdrawal symptoms though vitals do not show any autonomic instability, he does not appear tremulous on exam, and maintains attention though was not oriented to place. Affect remains constricted with some anxiety. Writer does have some suspicion for some cognitive impairment given his lack of orientation to place, statement of his age being 64, and denying any alcohol use prior to this past year. Interestingly, he did not remember that writer is one of his doctors. No nystagmus or ataxic gait on exam, did not appear to have any confabulation. MOCA results described below. However, even if there is an active neurocognitive issue, he has shown himself capable of navigating the health care system and did not present as either cachectic or particularly disheveled when he came to the hospital, indicating ability to care for self. Will change quetiapine to 200 mg at night given his inconsistent adherence. Trazodone will be made prn at 100 mg at night for insomnia. Remains unsafe for discharge given continued suicidality.     MOCA was conducted, with score of 16/30 (extra point given due to highest level of ed being 10th grade). He was unable to complete trail, copy cube, name rhino or camel. On registration he consistently could not remember first 2 items on both trials. He was unable to state digits in forward order, only remembered first, 4th and 5th digits. He could not maintain attention through list of letters, could not subtract 7's more than once, not able to name more than 1 word w/ F, did not have similarity of watch and ruler. He got none of the words for delayed recall, and only face and red from mult choice cues. He was fully oriented. His effort was poor overall, so it is unclear if this is a fully valid assessment.     ;;01/09: more social than in past; still complains of AH commanding self harm.  Alert; oriented; cognition intact; speech clear; no tremor or evidence of movement impairment.  increasing Seroquel to 300mg at night (last admission here left on 600-700mg at night)  ;;01/10: continues guarded and evasive only briskly speaking of command AH ; accepting does increase of Seroquel.  May need to add antihypertensive.   Current medications acetaminophen     Tablet .. 650 milliGRAM(s) Oral every 6 hours PRN  aluminum hydroxide/magnesium hydroxide/simethicone Suspension 30 milliLiter(s) Oral every 4 hours PRN  folic acid 1 milliGRAM(s) Oral daily  haloperidol     Tablet 5 milliGRAM(s) Oral every 6 hours PRN  LORazepam     Tablet 2 milliGRAM(s) Oral every 6 hours PRN  LORazepam     Tablet 2 milliGRAM(s) Oral every 2 hours PRN  magnesium hydroxide Suspension 30 milliLiter(s) Oral daily PRN  multivitamin 1 Tablet(s) Oral daily  nicotine  Polacrilex Gum 2 milliGRAM(s) Oral every 2 hours PRN  QUEtiapine 300 milliGRAM(s) Oral at bedtime to be raised to 400mg at night;   traZODone 100 milliGRAM(s) Oral at bedtime PRN    1/10: in bed; avoidant; Isolative; stays in bed; makes minimal eye contact; not conversational.  Immediately states "I am hearing voices telling me to kill myself" and won't elaborate beyond that but does acknowledge  Sleep  appetite ok; no pain issues.  Mood anxious.  avoids eye contact. Accepting Seroquel dose increase.  bp continues elevated; may need to consult medicine.    ;;01/11: patient is COVID positive; says little to the writer; poor eye contact; accepting remaingi in room in isolation area ;Seroquel to remain at 300mg at night for a couple of nights ; repeat COVID test on 01/13.  ;;01/12: AH persists; patient states they started when he was 24 years old; relation to substance use?; patient maintaining quaranteen; raising Seroquel to 400mg at night for psychosis.   ;;01/13: some evidence of improvement.  No longer has SI; denies AH but still somewhat preoccupied but better eye contact and less irritability; interested in transition to COVID+ hotel.  fair adls.   1/14: Adherent to meds, no events overnight. Continues to deny any SI, denies AH and VH as well, no HI. He has likely reached maximum level of benefit from inpatient hospitalization, working on dispo at this time.   Today patient is again reporting command AH to kill self with no intent in hospital  1/18: Pt is 10 days since his positive covid test and is currently asymptomatic.  Sleep  appetite ok; no pain issues.  Evidence for thought blocking and long response latencies. Not endorsing  suicidal or homicidal ideation intent or plans; no mention of auditory or visual hallucinations  No behavioral issues. i&j fair to poor : aware of medications and acknowledges symptoms but not reflective in a meaningful way  on issues that impact on symptoms.

## 2022-01-19 NOTE — BH INPATIENT PSYCHIATRY PROGRESS NOTE - NSBHMSEBODY_PSY_A_CORE
Average build/Overweight

## 2022-01-19 NOTE — BH INPATIENT PSYCHIATRY PROGRESS NOTE - NSTXALCDRGDATETRGT_PSY_ALL_CORE
12-Jan-2022

## 2022-01-19 NOTE — BH INPATIENT PSYCHIATRY PROGRESS NOTE - NSTXCOPEGOALOTHER_PSY_ALL_CORE
Patient will participate in group therapy
Pt will participate in therapeutic activities daily
Patient will participate in group therapy
Pt will participate in therapeutic activities daily

## 2022-01-19 NOTE — BH INPATIENT PSYCHIATRY PROGRESS NOTE - NSBHADMITMEDEDUDETAILS_A_CORE FT
Yes but patient would not allow discussion of alternatives stating nothing else helped.  

## 2022-01-19 NOTE — BH INPATIENT PSYCHIATRY PROGRESS NOTE - NSCGIIMPROVESX_PSY_ALL_CORE
2 = Much improved - notably better with signficant reduction of symptoms; increase in the level of functioning but some symptoms remain
2 = Much improved - notably better with signficant reduction of symptoms; increase in the level of functioning but some symptoms remain
3 = Minimally improved - slightly better with little or no clinically meaningful reduction of symptoms.  Represents very little change in basic clinical status, level of care, or functional capacity.
2 = Much improved - notably better with signficant reduction of symptoms; increase in the level of functioning but some symptoms remain
3 = Minimally improved - slightly better with little or no clinically meaningful reduction of symptoms.  Represents very little change in basic clinical status, level of care, or functional capacity.
3 = Minimally improved - slightly better with little or no clinically meaningful reduction of symptoms.  Represents very little change in basic clinical status, level of care, or functional capacity.
2 = Much improved - notably better with signficant reduction of symptoms; increase in the level of functioning but some symptoms remain
3 = Minimally improved - slightly better with little or no clinically meaningful reduction of symptoms.  Represents very little change in basic clinical status, level of care, or functional capacity.
2 = Much improved - notably better with signficant reduction of symptoms; increase in the level of functioning but some symptoms remain

## 2022-01-19 NOTE — BH INPATIENT PSYCHIATRY PROGRESS NOTE - NSBHFUPINTERVALCCFT_PSY_A_CORE
Stating he is hearing voices to kill self.	
"I'm still having thoughts of harming myself."	
Treated for AH command self harm. 	
"I still hear voices"		
Treated for AH command self harm. 	
Stating he is hearing voices to kill self.	
Stating he is hearing voices to kill self.	
Treated for AH command self harm. 	
Treated for AH command self harm.

## 2022-01-19 NOTE — BH INPATIENT PSYCHIATRY PROGRESS NOTE - NSCGISEVERILLNESS_PSY_ALL_CORE
5 = Markedly ill - intrusive symptoms that distinctly impair social/occupational function or cause intrusive levels of distress

## 2022-01-19 NOTE — BH INPATIENT PSYCHIATRY PROGRESS NOTE - NSBHADMITMEDEDUDETAILS_PSY_A_CORE FT
Patient aware of value of Seroquel for AH and rejects alternative approaches. 

## 2022-01-19 NOTE — BH INPATIENT PSYCHIATRY PROGRESS NOTE - NSTXPROBALCDRG_PSY_ALL_CORE
ALCOHOL OR DRUG WITHDRAWAL

## 2022-01-19 NOTE — BH INPATIENT PSYCHIATRY PROGRESS NOTE - NSTXCOPEDATEEST_PSY_ALL_CORE
13-Jan-2022
06-Jan-2022
13-Jan-2022
06-Jan-2022
06-Jan-2022

## 2022-01-19 NOTE — BH INPATIENT PSYCHIATRY PROGRESS NOTE - NSICDXBHPRIMARYDX_PSY_ALL_CORE
Schizoaffective schizophrenia   F25.9  

## 2022-01-19 NOTE — BH INPATIENT PSYCHIATRY PROGRESS NOTE - NSTXALCDRGDATEEST_PSY_ALL_CORE
05-Jan-2022

## 2022-01-19 NOTE — BH INPATIENT PSYCHIATRY PROGRESS NOTE - NSTXPSYCHOINTERMD_PSY_ALL_CORE
Seroquel for AH; Trazodone for sleep; monitor for withdrawal. 

## 2022-01-19 NOTE — BH INPATIENT PSYCHIATRY PROGRESS NOTE - NSTXALCDRGGOAL_PSY_ALL_CORE
Score on withdrawal scale will be WNL
Will not display signs/symptoms of complications of withdrawal
Will utilize coping/calming strategies to manage with withdrawal symptoms daily
Score on withdrawal scale will be WNL
Will utilize coping/calming strategies to manage with withdrawal symptoms daily
Will not display signs/symptoms of complications of withdrawal
Score on withdrawal scale will be WNL

## 2022-01-20 ENCOUNTER — EMERGENCY (EMERGENCY)
Facility: HOSPITAL | Age: 62
LOS: 1 days | Discharge: HOME | End: 2022-01-20
Attending: EMERGENCY MEDICINE | Admitting: EMERGENCY MEDICINE
Payer: MEDICARE

## 2022-01-20 VITALS
HEART RATE: 86 BPM | SYSTOLIC BLOOD PRESSURE: 152 MMHG | HEIGHT: 67 IN | TEMPERATURE: 98 F | WEIGHT: 175.05 LBS | DIASTOLIC BLOOD PRESSURE: 88 MMHG | OXYGEN SATURATION: 98 % | RESPIRATION RATE: 19 BRPM

## 2022-01-20 DIAGNOSIS — I10 ESSENTIAL (PRIMARY) HYPERTENSION: ICD-10-CM

## 2022-01-20 DIAGNOSIS — R45.851 SUICIDAL IDEATIONS: ICD-10-CM

## 2022-01-20 DIAGNOSIS — E03.9 HYPOTHYROIDISM, UNSPECIFIED: ICD-10-CM

## 2022-01-20 DIAGNOSIS — Z20.822 CONTACT WITH AND (SUSPECTED) EXPOSURE TO COVID-19: ICD-10-CM

## 2022-01-20 LAB
ALBUMIN SERPL ELPH-MCNC: 4.7 G/DL — SIGNIFICANT CHANGE UP (ref 3.3–5)
ALP SERPL-CCNC: 112 U/L — SIGNIFICANT CHANGE UP (ref 40–120)
ALT FLD-CCNC: 112 U/L — HIGH (ref 10–45)
AMPHET UR-MCNC: NEGATIVE — SIGNIFICANT CHANGE UP
ANION GAP SERPL CALC-SCNC: 18 MMOL/L — HIGH (ref 5–17)
APAP SERPL-MCNC: <5 UG/ML — LOW (ref 10–30)
APPEARANCE UR: CLEAR — SIGNIFICANT CHANGE UP
AST SERPL-CCNC: 85 U/L — HIGH (ref 10–40)
BACTERIA # UR AUTO: PRESENT /HPF
BARBITURATES UR SCN-MCNC: NEGATIVE — SIGNIFICANT CHANGE UP
BASOPHILS # BLD AUTO: 0.03 K/UL — SIGNIFICANT CHANGE UP (ref 0–0.2)
BASOPHILS NFR BLD AUTO: 0.2 % — SIGNIFICANT CHANGE UP (ref 0–2)
BENZODIAZ UR-MCNC: NEGATIVE — SIGNIFICANT CHANGE UP
BILIRUB SERPL-MCNC: 0.4 MG/DL — SIGNIFICANT CHANGE UP (ref 0.2–1.2)
BILIRUB UR-MCNC: NEGATIVE — SIGNIFICANT CHANGE UP
BUN SERPL-MCNC: 11 MG/DL — SIGNIFICANT CHANGE UP (ref 7–23)
CALCIUM SERPL-MCNC: 10.1 MG/DL — SIGNIFICANT CHANGE UP (ref 8.4–10.5)
CHLORIDE SERPL-SCNC: 98 MMOL/L — SIGNIFICANT CHANGE UP (ref 96–108)
CO2 SERPL-SCNC: 22 MMOL/L — SIGNIFICANT CHANGE UP (ref 22–31)
COCAINE METAB.OTHER UR-MCNC: POSITIVE
COLOR SPEC: YELLOW — SIGNIFICANT CHANGE UP
CREAT SERPL-MCNC: 0.75 MG/DL — SIGNIFICANT CHANGE UP (ref 0.5–1.3)
DIFF PNL FLD: NEGATIVE — SIGNIFICANT CHANGE UP
EOSINOPHIL # BLD AUTO: 0 K/UL — SIGNIFICANT CHANGE UP (ref 0–0.5)
EOSINOPHIL NFR BLD AUTO: 0 % — SIGNIFICANT CHANGE UP (ref 0–6)
EPI CELLS # UR: SIGNIFICANT CHANGE UP /HPF (ref 0–5)
ETHANOL SERPL-MCNC: 12 MG/DL — HIGH (ref 0–10)
GLUCOSE SERPL-MCNC: 81 MG/DL — SIGNIFICANT CHANGE UP (ref 70–99)
GLUCOSE UR QL: NEGATIVE — SIGNIFICANT CHANGE UP
GRAN CASTS # UR COMP ASSIST: ABNORMAL /LPF
HCT VFR BLD CALC: 43.8 % — SIGNIFICANT CHANGE UP (ref 39–50)
HGB BLD-MCNC: 15.2 G/DL — SIGNIFICANT CHANGE UP (ref 13–17)
IMM GRANULOCYTES NFR BLD AUTO: 0.4 % — SIGNIFICANT CHANGE UP (ref 0–1.5)
KETONES UR-MCNC: 15 MG/DL
LEUKOCYTE ESTERASE UR-ACNC: NEGATIVE — SIGNIFICANT CHANGE UP
LYMPHOCYTES # BLD AUTO: 1.36 K/UL — SIGNIFICANT CHANGE UP (ref 1–3.3)
LYMPHOCYTES # BLD AUTO: 10.9 % — LOW (ref 13–44)
MCHC RBC-ENTMCNC: 31.4 PG — SIGNIFICANT CHANGE UP (ref 27–34)
MCHC RBC-ENTMCNC: 34.7 GM/DL — SIGNIFICANT CHANGE UP (ref 32–36)
MCV RBC AUTO: 90.5 FL — SIGNIFICANT CHANGE UP (ref 80–100)
METHADONE UR-MCNC: NEGATIVE — SIGNIFICANT CHANGE UP
MONOCYTES # BLD AUTO: 0.65 K/UL — SIGNIFICANT CHANGE UP (ref 0–0.9)
MONOCYTES NFR BLD AUTO: 5.2 % — SIGNIFICANT CHANGE UP (ref 2–14)
NEUTROPHILS # BLD AUTO: 10.34 K/UL — HIGH (ref 1.8–7.4)
NEUTROPHILS NFR BLD AUTO: 83.3 % — HIGH (ref 43–77)
NITRITE UR-MCNC: NEGATIVE — SIGNIFICANT CHANGE UP
NRBC # BLD: 0 /100 WBCS — SIGNIFICANT CHANGE UP (ref 0–0)
OPIATES UR-MCNC: NEGATIVE — SIGNIFICANT CHANGE UP
PCP SPEC-MCNC: SIGNIFICANT CHANGE UP
PCP UR-MCNC: NEGATIVE — SIGNIFICANT CHANGE UP
PH UR: 6 — SIGNIFICANT CHANGE UP (ref 5–8)
PLATELET # BLD AUTO: 331 K/UL — SIGNIFICANT CHANGE UP (ref 150–400)
POTASSIUM SERPL-MCNC: 3.6 MMOL/L — SIGNIFICANT CHANGE UP (ref 3.5–5.3)
POTASSIUM SERPL-SCNC: 3.6 MMOL/L — SIGNIFICANT CHANGE UP (ref 3.5–5.3)
PROT SERPL-MCNC: 9 G/DL — HIGH (ref 6–8.3)
PROT UR-MCNC: 30 MG/DL
RBC # BLD: 4.84 M/UL — SIGNIFICANT CHANGE UP (ref 4.2–5.8)
RBC # FLD: 14.6 % — HIGH (ref 10.3–14.5)
RBC CASTS # UR COMP ASSIST: < 5 /HPF — SIGNIFICANT CHANGE UP
SALICYLATES SERPL-MCNC: <0.3 MG/DL — LOW (ref 2.8–20)
SARS-COV-2 RNA SPEC QL NAA+PROBE: NEGATIVE — SIGNIFICANT CHANGE UP
SODIUM SERPL-SCNC: 138 MMOL/L — SIGNIFICANT CHANGE UP (ref 135–145)
SP GR SPEC: 1.02 — SIGNIFICANT CHANGE UP (ref 1–1.03)
THC UR QL: POSITIVE
UROBILINOGEN FLD QL: 0.2 E.U./DL — SIGNIFICANT CHANGE UP
WBC # BLD: 12.43 K/UL — HIGH (ref 3.8–10.5)
WBC # FLD AUTO: 12.43 K/UL — HIGH (ref 3.8–10.5)
WBC UR QL: < 5 /HPF — SIGNIFICANT CHANGE UP

## 2022-01-20 PROCEDURE — 99285 EMERGENCY DEPT VISIT HI MDM: CPT

## 2022-01-20 PROCEDURE — 99284 EMERGENCY DEPT VISIT MOD MDM: CPT | Mod: 95

## 2022-01-20 RX ADMIN — Medication 25 MILLIGRAM(S): at 22:18

## 2022-01-20 NOTE — ED PROVIDER NOTE - PROGRESS NOTE DETAILS
pt reeval by psych and stable for dc.  pt will obtain his meds/follow up at [facility].  discussed strict return parameters

## 2022-01-20 NOTE — ED PROVIDER NOTE - CONSTITUTIONAL, MLM
normal... Anxious appearing, cooperative, awake, alert, oriented to person, place, time/situation and in no apparent distress.

## 2022-01-20 NOTE — ED ADULT NURSE NOTE - OBJECTIVE STATEMENT
61y Male A&OX4 Un-domiciled to ED c/o auditory hallucinations "telling me to kill myself" and increased feelings of hopelessness x1 month. Pt calm and co-operative with good eye contact. Pt reports having a plan of jumping in front of a train. Denies hx of self harm nor HI. Endorse visual hallucinations as well "I see shadows". Hx of Schizophrenia depression, ad borderline personalty disorder. Has not been compliant with medications for 2 months "Because I no longer have a doctor to order them for me". Pt denies drug use. Reports last Alcohol intake 2 days ago. Daily drinker. Reports tremor, headache, n/v, and headache at this time. Vomited once before ED arrival (phlegm no blood). Hx of hypothyroidism and Hep c. Pt placed in yellow gown with red socks and yellow wrist band. Wonded by security. Belongings bagged labeled and placed in locker. Pt continues to be on constant observation. 61y Male A&OX4 Un-domiciled to ED c/o auditory hallucinations "telling me to kill myself" and increased feelings of hopelessness x1 month. Pt calm and co-operative with good eye contact. Pt reports having a plan of jumping in front of a train. Denies hx of self harm nor HI. Endorse visual hallucinations as well "I see shadows". Hx of Schizophrenia depression, ad borderline personalty disorder. Has not been compliant with medications for 2 months "Because I no longer have a doctor to order them for me". Pt denies drug use. Reports last Alcohol intake 2 days ago. Daily drinker. Reports tremor, headache, n/v, and headache at this time. Vomited once before ED arrival (phlegm no blood). No vomiting at time of assessment. Hx of hypothyroidism and Hep c. Pt placed in yellow gown with red socks and yellow wrist band. Wonded by security. Belongings bagged labeled and placed in locker. Pt continues to be on constant observation. 61y Male A&OX4 Un-domiciled to ED c/o auditory hallucinations "telling me to kill myself" and increased feelings of hopelessness x1 month. Pt calm and co-operative with good eye contact. Pt reports having a plan of jumping in front of a train. Denies hx of self harm nor HI. Endorse visual hallucinations as well "I see shadows". Hx of Schizophrenia depression, ad borderline personalty disorder. Has not been compliant with medications for 2 months "Because I no longer have a doctor to order them for me". Pt denies drug use. Reports last Alcohol intake 2 days ago. Daily drinker. Reports tremor, headache, n/v, and headache at this time. Vomited once before ED arrival (phlegm no blood). No vomiting at time of assessment. Hx of hypothyroidism and Hep c. Pt placed in yellow gown with red socks and yellow wrist band. Wonded by security. Belongings bagged labeled and placed in locker. Pt continues to be on constant observation. Discharged from [facility] yesterday.

## 2022-01-20 NOTE — ED PROVIDER NOTE - CLINICAL SUMMARY MEDICAL DECISION MAKING FREE TEXT BOX
60 y/o M pt presents to ED with SI. Plan for labs, pt medically stable for psych evaluation, and telepsych consult placed.

## 2022-01-20 NOTE — ED PROVIDER NOTE - NSICDXPASTMEDICALHX_GEN_ALL_CORE_FT
PAST MEDICAL HISTORY:  Drug abuse     Exposure to COVID-19 virus     Hepatitis C     Hypertension     Hypothyroid     Schizophrenia

## 2022-01-20 NOTE — ED PROVIDER NOTE - NS ED ATTENDING STATEMENT MOD
Otc Regimen: Am-lactic 2-3 times daily Detail Level: Zone Continue Regimen: Continue betamethasone topical ointment Sig: Apply to areas bid x2weeks as needed for flares Continue Regimen: HQ 6% under eyes and on elbows qhs Attending Only

## 2022-01-20 NOTE — ED PROVIDER NOTE - OBJECTIVE STATEMENT
60 y/o M pt with PMHx of schizophrenia presents to ED with SI today. Pt states he's been hearing voices telling him to kill himself today. He was recently admitted to [facility] psych for SI, but states he has persistent symptoms. Pt takes Seroquel and Trazadone at home.

## 2022-01-20 NOTE — ED PROVIDER NOTE - PSYCHIATRIC, MLM
Alert and oriented to person, place, time/situation. Increased anxiousness. no apparent risk to self or others.

## 2022-01-20 NOTE — ED PROVIDER NOTE - PATIENT PORTAL LINK FT
You can access the FollowMyHealth Patient Portal offered by [facility] by registering at the following website: [email or web address]. By joining [employer]’s FollowMyHealth portal, you will also be able to view your health information using other applications (apps) compatible with our system.

## 2022-01-20 NOTE — ED ADULT NURSE NOTE - NSIMPLEMENTINTERV_GEN_ALL_ED
Implemented All Fall Risk Interventions:  [place] to call system. Call bell, personal items and telephone within reach. Instruct patient to call for assistance. Room bathroom lighting operational. Non-slip footwear when patient is off stretcher. Physically safe environment: no spills, clutter or unnecessary equipment. Stretcher in lowest position, wheels locked, appropriate side rails in place. Provide visual cue, wrist band, yellow gown, etc. Monitor gait and stability. Monitor for mental status changes and reorient to person, place, and time. Review medications for side effects contributing to fall risk. Reinforce activity limits and safety measures with patient and family.

## 2022-01-20 NOTE — ED PROVIDER NOTE - NSFOLLOWUPINSTRUCTIONS_ED_ALL_ED_FT
Please continue your home medications and follow up at the [facility] as scheduled.      Follow up with your primary care doctor or clinics listed below if you do not have a doctor,    [address] [address]  To make an appointment, call [phone]    [facility], [place]  Address: [address] [place] [id number]  Appointment Center: [phone] ([phone])     [address] [place] [address] is a good referral line for crisis and substance abuse help.  [facility] has drop in programs all over the [facility].    Return to the ER for Emergencies.  Return immediately for any new or worsening symptoms or any new concerns

## 2022-01-21 VITALS
OXYGEN SATURATION: 97 % | SYSTOLIC BLOOD PRESSURE: 126 MMHG | TEMPERATURE: 98 F | RESPIRATION RATE: 18 BRPM | DIASTOLIC BLOOD PRESSURE: 76 MMHG | HEART RATE: 86 BPM

## 2022-01-21 DIAGNOSIS — F19.90 OTHER PSYCHOACTIVE SUBSTANCE USE, UNSPECIFIED, UNCOMPLICATED: ICD-10-CM

## 2022-01-21 PROBLEM — I10 ESSENTIAL (PRIMARY) HYPERTENSION: Chronic | Status: ACTIVE | Noted: 2022-01-10

## 2022-01-21 PROBLEM — Z20.822 CONTACT WITH AND (SUSPECTED) EXPOSURE TO COVID-19: Chronic | Status: ACTIVE | Noted: 2022-01-11

## 2022-01-21 PROCEDURE — 93005 ELECTROCARDIOGRAM TRACING: CPT

## 2022-01-21 PROCEDURE — 99285 EMERGENCY DEPT VISIT HI MDM: CPT

## 2022-01-21 PROCEDURE — 36415 COLL VENOUS BLD VENIPUNCTURE: CPT

## 2022-01-21 PROCEDURE — 85025 COMPLETE CBC W/AUTO DIFF WBC: CPT

## 2022-01-21 PROCEDURE — 90792 PSYCH DIAG EVAL W/MED SRVCS: CPT

## 2022-01-21 PROCEDURE — 80307 DRUG TEST PRSMV CHEM ANLYZR: CPT

## 2022-01-21 PROCEDURE — 81001 URINALYSIS AUTO W/SCOPE: CPT

## 2022-01-21 PROCEDURE — 87635 SARS-COV-2 COVID-19 AMP PRB: CPT

## 2022-01-21 PROCEDURE — 80053 COMPREHEN METABOLIC PANEL: CPT

## 2022-01-21 RX ORDER — QUETIAPINE FUMARATE 200 MG/1
400 TABLET, FILM COATED ORAL ONCE
Refills: 0 | Status: COMPLETED | OUTPATIENT
Start: 2022-01-21 | End: 2022-01-21

## 2022-01-21 RX ADMIN — QUETIAPINE FUMARATE 400 MILLIGRAM(S): 200 TABLET, FILM COATED ORAL at 00:52

## 2022-01-21 NOTE — ED BEHAVIORAL HEALTH ASSESSMENT NOTE - DETAILS
reports FHx of alcohol use d/o informed seen at [facility] yes to kill self endorses CAH after crack/cocaine use

## 2022-01-21 NOTE — ED BEHAVIORAL HEALTH ASSESSMENT NOTE - SUMMARY
Patient is a 61-year-old male, street homeless, , history of alcohol use d/o, other substance use (cocaine, cannabis), ?psychotic disorder, frequent visits to emergency room with substance-induced complaint who presented himself to the ED with complaint of command AH to kill himself.  Patient presents 24 hours after d/c from inpatient psychiatry, where he was stable with no CAH or suicidality at time of discharge.  patient reports leaving, drinking 1 pt. of liquor and using 50 dollars worth of Crack/cocaine.  Subsequently endorsing return of CAH to kill self (consistent with prior presentations).  Patient likely with substance induced symptomatology combined with malingering for housing as he reports he is homeless and does not want to go to shelter placement.  Patient likely needs more time for substances to metabolize and show reduction in psychosis.  Would recommend hold in ed for re-assess after full sobriety.  Explore rehab (does not need detox as he was sober x 14 days during inpatient stay with 1 day relapse). Patient is a 61-year-old male, street homeless, , history of alcohol use d/o, other substance use (cocaine, cannabis), ?psychotic disorder, frequent visits to emergency room with substance-induced complaint who presented himself to the ED with complaint of command AH to kill himself.  Patient presents 24 hours after d/c from inpatient psychiatry, where he was stable with no CAH or suicidality at time of discharge.  patient reports leaving, drinking 1 pt. of liquor and using 50 dollars worth of Crack/cocaine.  Subsequently endorsing return of CAH to kill self (consistent with prior presentations).  Patient likely with substance induced symptomatology combined with malingering for housing as he reports he is homeless and does not want to go to shelter placement.  Patient likely needs more time for substances to metabolize and show reduction in psychosis.  Would recommend hold in ed for re-assess after full sobriety.  Explore rehab (does not need detox as he was sober x 14 days during inpatient stay with 1 day relapse). Hold for reassess after further substance metabolization; however if patient recants he may be psychiatrically cleared for discharge at any time as this would be in line with known baseline and previous visits.

## 2022-01-21 NOTE — BH CONSULTATION LIAISON PROGRESS NOTE - NSBHADMITCOUNSEL_PSY_A_CORE
importance of adherence to chosen treatment/risk factor reduction/client/family/caregiver education/other...

## 2022-01-21 NOTE — ED ADULT NURSE REASSESSMENT NOTE - NS ED NURSE REASSESS COMMENT FT1
report received from night shift RN. Pt sleeping in stretcher. Constant observation remains in place. All safety precautions maintained. Pending psych re-eval.

## 2022-01-21 NOTE — BH CONSULTATION LIAISON PROGRESS NOTE - NSBHASSESSMENTFT_PSY_ALL_CORE
Per overnight telepsych assessment: "Patient is a 61-year-old male, street homeless, , history of alcohol use d/o, other substance use (cocaine, cannabis), ?psychotic disorder, frequent visits to emergency room with substance-induced complaint who presented himself to the ED with complaint of command AH to kill himself. Patient presents 24 hours after d/c from inpatient psychiatry, where he was stable with no CAH or suicidality at time of discharge.  patient reports leaving, drinking 1 pt. of liquor and using 50 dollars worth of Crack/cocaine.  Subsequently endorsing return of CAH to kill self (consistent with prior presentations).  Patient likely with substance induced symptomatology combined with malingering for housing as he reports he is homeless and does not want to go to shelter placement.  Patient likely needs more time for substances to metabolize and show reduction in psychosis.  Would recommend hold in ed for re-assess after full sobriety.  Explore rehab (does not need detox as he was sober x 14 days during inpatient stay with 1 day relapse). Hold for reassess after further substance metabolization; however if patient recants he may be psychiatrically cleared for discharge at any time as this would be in line with known baseline and previous visits."    Upon reassessment this AM by writer and Dr. Patrick, patient observed to be calm, cooperative, no observable signs of distress or psychosis, although cognitive delay apparent. Due to patient's discharge from [facility] 24 hours ago, it is highly unlikely that symptoms are a result of nonadherence with medication and more likely a result of substance use or malingering. Patient has an extensive history of utilizing the ED/inpatient admission for secondary gain of housing and often displays little insight into the connection between his symptoms and substance use. No clear benefit to inpatient admission as patient's symptoms have responded to substance metabolization and Seroquel received in the ED. While patient's risk of suicidality is chronically elevated due to substance use, no reason to believe patient is at an increased acute risk of harm to self/others.     PLAN  -- Safe for discharge.   -- Suggest discharging with 7 days of Seroquel 400mg PO qHS and trazodone 100mg PO qHS.  -- [facility].

## 2022-01-21 NOTE — ED BEHAVIORAL HEALTH NOTE - BEHAVIORAL HEALTH NOTE
===================    PRE-HOSPITAL COURSE    ===================    SOURCE:  RN and secondhand EMR documentation.     DETAILS:  Patient presented self to ED; chief complaint of CAH/SI.     ============    ED COURSE:    ============    SOURCE:  RN and secondhand EMR documentation.       ARRIVAL:  Patient was cooperative with hospital protocol and allowed for gowning/wanding without incident. Patient presents with fair hygiene/grooming. Patient placed on 1:1 observation in private room.     BELONGINGS:  None notable.     BEHAVIOR: Blood/urine provided for routine labs without incident. Patient endorses SI with plan to jump in front of traffic, CAH to harm self, as well as AH/VH, no HI elicited. Patient presents as calm and cooperative with ED staff. Patient is AOx4 and does make eye contact; speech of normal volume/rate accompanied by a logical thought process. Patient has been resting while in ED and has had water to drink.     TREATMENT: Patient has received Librium 25mg PO ~22:03.     VISITORS:  Patient presently unaccompanied by social supports while in ED.            COVID Exposure Screen- collateral (i.e. third-party, chart review, belongings, etc; include EMS and ED staff)     1. *Has the patient been tested for COVID-19 in the last 90 days? (X) Yes ( ) No ( ) Unknown- Reason: _____      IF YES PROCEED TO QUESTION #2. IF NO OR UNKNOWN, PLEASE SKIP TO QUESTION #3.      2. Date of test(s), type of test(s), result(s) for ALL tests in last 90 days: ____PCR Positive as of 1/10____     3. *Has the patient received a COVID-19 vaccine? ( ) Yes (X) No ( ) Unknown- Reason: _____      IF YES PROCEED TO QUESTION #4. IF NO or UNKNOWN, PLEASE SKIP TO QUESTION #7.      4. Moderna ( ) Pfizer ( ) J&J ( )       5. Number of doses: ________      6. Date of last dose: ________      7. *In the past 10 days, has the patient been around anyone with a positive COVID-19 test?* ( ) Yes ( ) No (X) Unknown- Reason: ____      IF YES PLEASE ANSWER THE FOLLOWING QUESTIONS:      8. Was the patient within 6 feet of them for at least 15 minutes? ( ) Yes ( ) No ( ) Unknown- Reason: _____      9. Did the patient provide care for them? ( ) Yes ( ) No ( ) Unknown- Reason: ______      10. Did the patient have direct physical contact with them (touched, hugged, or kissed them)? ( ) Yes ( ) No ( ) Unknown- Reason: __      11. Did the patient share eating or drinking utensils with them? ( ) Yes ( ) No ( ) Unknown- Reason: ____      12. Did they sneeze, cough, or somehow get respiratory droplets on the patient? ( ) Yes ( ) No ( ) Unknown- Reason: ______

## 2022-01-21 NOTE — BH CONSULTATION LIAISON PROGRESS NOTE - OTHER
[facility]     Date of visit: 12/31/2019    Reason for consultation:    Marcel Melchor is a 47 year old male who is seen in consultation today at the request of his PCP,  Wes Braga for evaluation of his pain issues and recommendations for management, with specific emphasis on  Reason for Referral: Evaluation for comprehensive services- patients will be evaluated if appropriate for comprehensive service including medication changes, procedures, pain psychology, and pain physical therapy.  While involved with comprehensive services, pain providers will work with referring provider/PCP to stabilize appropriate medication management, with long-term plan of transition of prescribing back to referring provider/PCP upon completion of comprehensive services.      Please complete the following questions:    Do you have any specific questions for the pain specialist? No    Are there any red flags that may impact the assessment or management of the patient? None      What is your diagnosis for the patient's pain? Lumbar radiculopathy     Please see the [facility] [place] health questionnaire which the patient completed and reviewed with me in detail.    Review of [place] Prescription Monitoring Program (): No concern for abuse or misuse of controlled medications based on this report.     Pain medications are being prescribed by NA.     Subjective:    Chief Complaint:    Chief Complaint   Patient presents with     Pain       Pain history:  Marcel Melchor is a 47 year old male who presents for initial evaluation of chief complaint of low back pain.      He first started having problems with low back pain in 1990. He states he twisted his back wrong at work injuring his low back at that time. Of note, he had had an active lifestyle over the years that may have contributed (wrestling, snowmobiling, etc.). He established care with a non [facility] orthopedist in 2016, had  "three L3-4 lumbar epidural steroid injections without benefit. He wanted a second opinion, was referred to [facility] in 2017, had a visit with Dr. Hastings. An injection was recommended. He had a right L4-5 transforaminal epidural steroid injection with Dr. Hastings on 3/3/17. He had nine months of pain relief with this. He repeated this injection with Dr. Johnson on 4/19/18 which was helpful for 10 months, on 3/13/19 with Dr. Johnson which was helpful for 5 months, most recently as on 9/9/19 with Dr. Steen. He states he had the best pain relief after this injection, however it started to wear off sooner, in November.  He has some relief in a sauna. He has tried cryotherapy with some benefit, has alternated with chiropractic care in [place] with some benefit. Hx of neck pain as well, has had cervical epidural steroid injections with benefit in the past, last 8 years ago. He did work with a pain clinic in [place] for a period of time. The pain is located in bilateral low back, radiates into right buttock only. Numbness and tingling in bilateral hands. Subjective weakness only.       Pain description:  Location: low back  Quality: aching  Severity/Intensity: 4/10 at best, 8/10 at worst, 5/10 on average  Aggravating factors include: sitting  Relieving factors include: laying down,yoga, sauna, massage, acupuncture, chiropractic care     The patient otherwise denies bowel or bladder incontinence, parasthesias, weakness, saddle anesthesia, unintentional weight loss, or fever/chills/sweats.     Marcel Melchor has been seen at a pain clinic in the past.  Pain clinic in [place] [facility].     Pain Treatments:  (H--helped; HI--Helped initially; [facility]--Somewhat helpful; NH--No help; W--worse; SE--side effects; ?--Unsure if helpful)   1. Medications:       Current pain medications:   Prozac 20mg daily- H for mood, \"I feel like a normal person\"    Adderall 10mg daily prn- H for ADD/narcolepsy, " grinding teeth Nuvigil 250mg daily- H, prescribed by sleep medicine provider   Propanol 20mg TID prn- H for anxiety   Ibuprofen 400mg prn- [facility], hx of crohns, stomach upset   Lidocaine patches- [facility], alternates with Aspercreme and Tiger balm    Current calculated MME: 0    1. Previous Pain Relevant Medications:  NOTE: This medication information taken from patient's intake form, not medical records.    Opiates: Tylenol #3- ?, Norco- ?   NSAIDS: hx of crohns     Muscle Relaxants: Flexeril- NH,SE, narcolepsy makes very tired   Anti-migraine mediations: no   Anti-depressants: Prozac- H, Effexor- ?   Sleep aids: no   Anxiolytics: no   Neuropathics: gabapentin- ?/SE, narcolepsy, makes sleepy     Topicals: lidocaine patches- [facility]   Other medications not covered above: Tylenol- NH    2. Physical Therapy: in 1990- [facility], traction- [facility]  3. Pain Psychology: no  4. Surgery: no  5. Injections: right L4-5 transforaminal epidural steroid injection with Dr. Steen on 9/9/19- H for 2-3 months  Right L4-5 transforaminal epidural steroid injection with Dr. Johnson on 3/13/19- H for 5 months   Right L4-5 transforaminal epidural steroid injection with Dr. Johnson on 4/19/18- H for 10 months     Right L4-5 transforaminal epidural steroid injection with Dr. Hastings on 3/3/17- 9 months   x3 L3-4 epidural steroid injections 2016- [facility]  6. Chiropractic: yes going on a regular basis, goes to [facility] in [place]- [facility], cryotherapy- [facility]  7. Acupuncture: yes- going on a regular basis it does help, last had in 6 weeks  8. TENS Unit: yes- [facility], short term    Imaging:  MRI of lumbar spine was completed on 11/3/16 and shows:        Past Medical History:  Past Medical History:   Diagnosis Date     Anxiety 9/3/2013     AJAY (obstructive sleep apnea) 11/11/2014     Rotator cuff syndrome 1/19/2012       Past Surgical History:  Past Surgical History:   Procedure Laterality Date     C NONSPECIFIC PROCEDURE      fx R ankle-ORIF       Medications:  Current Outpatient  Medications   Medication Sig Dispense Refill     amphetamine-dextroamphetamine (ADDERALL) 10 MG tablet TAKE 1 TABLET BY MOUTH IN THE AFTERNOON PRN  0     armodafinil (NUVIGIL) 250 MG TABS tablet TAKE 1 TABLET BY MOUTH EVERY DAY IN THE MORNING  1     ASACOL  MG EC tablet TAKE 3 TABLETS BY MOUTH TWICE DAILY  0     clindamycin (CLEOCIN T) 1 % external lotion APPLY TOPICALLY TO FACE AND CHEST TWICE A DAY AS NEEDED 60 mL 3     FLUoxetine (PROZAC) 20 MG capsule Take 1 capsule (20 mg) by mouth daily 90 capsule 1     mesalamine (CANASA) 1000 MG Suppository Place 1,000 mg rectally 2 times daily       propranolol (INDERAL) 20 MG tablet Take 1 tablet (20 mg) by mouth 3 times daily as needed (anxiety) 60 tablet 1     testosterone cypionate (DEPOTESTOTERONE) 200 MG/ML injection Inject 50 mg into the muscle every 14 days         Allergies:     Allergies   Allergen Reactions     Amoxicillin      itching       Social History:  Home situation: lives in a house  Support system: wife and daughter  Occupation/Schooling:    Tobacco use: no  Drug use: no  Alcohol use: no, binge drinking in early 20s  History of chemical dependency treatment: no  Mental health admissions: no    Family history:  Family History   Problem Relation Age of Onset     Breast Cancer Mother          at age 47       Review of Systems:    POSTIVE IN BOLD  GENERAL: fever/chills, fatigue, general unwell feeling, weight gain/loss.  HEAD/EYES:  headache, dizziness, or vision changes.    EARS/NOSE/THROAT: nosebleeds, hearing loss, sinus infection, earache, tinnitus.  IMMUNE:  allergies, cancer, immune deficiency, or infections.  SKIN:  itching, rash, hives  HEME/Lymphatic: anemia, easy bruising, easy bleeding.  RESPIRATORY: cough, wheezing, or shortness of breath  CARDIOVASCULAR/Circulation: extremity edema, syncope, hypertension, tachycardia, or angina.  GASTROINTESTINAL: abdominal pain, nausea/emesis, diarrhea, constipation,  hematochezia, or  melena.  ENDOCRINE:  diabetes, steroid use,  thyroid disease or osteoporosis.  MUSCULOSKELETAL: joint pain, stiffness, neck pain, back pain, arthritis, or gout.  GENITOURINARY: frequency, urgency, dysuria, difficulty voiding, hematuria or incontinence.  NEUROLOGIC: weakness, numbness, paresthesias, seizure, tremor, stroke or memory loss.  PSYCHIATRIC: depression, anxiety, stress, suicidal thoughts or mood swings.     Objective:    Physical Exam:  Vitals:    12/31/19 1449   BP: 126/75   Pulse: 63   SpO2: 98%     Exam:  Constitutional: Well developed, well nourished, appears stated age.  HEENT: Head atraumatic, normocephalic. Eyes without conjunctival injection or jaundice. Oropharynx clear. Neck supple. No obvious neck masses.  Skin: No rash, lesions, or petechiae of exposed skin.   Extremities: Peripheral pulses intact. No clubbing, cyanosis, or edema.  Psychiatric/mental status: Alert, without lethargy or stupor. Speech fluent. Appropriate affect. Mood normal. Able to follow commands without difficulty.     Musculoskeletal exam:  Able to walk on the heels and toes without difficulty. Patient does not have an antalgic gait .   Normal bulk and tone. Unremarkable spinal curvature.     Cervical spine:  Range of motion within normal limits.  Tenderness in the cervical paraspinal muscles.No    Thoracic spine:    Kyphosis. No   Tenderness in the thoracic paraspinal muscles.No    Lumbar spine:    Flex:  90 degrees   Ext: 20 degrees   Tenderness in the lumbar paraspinal muscles.No   Rotation/ext to right: painful    Rotation/ext to left: painful     Myofascial tenderness:  no  Focal tenderness: No SI joint, gluteal, piriformis, or GT tenderness  Straight leg raise: negative   FADIR: negative     Hip exam:   Normal internal and external range of motion bilaterally. SARAHI negative .     Neurologic exam:  CN:  Cranial nerves 2-12 are grossly intact  Motor:  5/5 UE and LE strength  Strength:       C4 (shoulder shrug)  symmetric  5/5       C5 (shoulder abduction) symmetric 5/5       C6 (elbow flexion) symmetric 5/5       C7 (elbow extension) symmetric 5/5       C8 (finger abduction, thumb flexion) symmetric 5/5    Reflexes:     Biceps:     R:  2/4 L: 2/4   Brachioradialis   R:  2/4 L: 2/4   Patella:  R:  1/4 L: 1/4   Achilles:  R:  1/4 L: 1/4    Sensory:   Light touch: normal bilateral upper and lower extremities    No allodynia, dysesthesia, or hyperalgesia.      Assessment:  Marcel Melchor is a 47 year old male with a past medical history significant for narcolepsy, rotator cuff syndrome, AJAY, and anxiety who presents with complaints of low back pain .     1. Low back pain- etiology likely L4-5 disc extrusion, abutting L5 nerve root.   2. Mental Health - the patient's mental health concerns, specifically anxiety, affect his experience of pain and contribute to his clinically significant distress.    1. Lumbar radiculopathy        Plan:  The following recommendations were given to the patient. Diagnosis, treatment options, risks, benefits, and alternatives were discussed, and all questions were answered. The patient expressed understanding of the plan for management.     I am recommending a multidisciplinary treatment plan to help this patient better manage his pain.  This includes:      1.  Pain Physical Therapy:     YES Marcel has not completed physical therapy since 1990 for his low back. Would highly recommend. We discussed traditional physical therapy for pain physical therapy. He has some hesitation regarding physical therapy that is too aggressive, would like to start with pain physical therapy and then possibly progress to BRETT. Schedule first visit with Kenya Calderon PT and try to have 2-3 sessions prior to next visit.    2.  Pain Psychologist to address relaxation, behavioral change, coping style, and other factors important to improvement.    YES  Marcel does have a therapist but is interested in pain psychology, would like to  focus on pain management strategies. Schedule first visit with Elisa Brown PsyD, LP.    3.  Diagnostic Studies: his lumbar MRI was completed three years ago in November, just slightly over the recommended time frame. We discussed will likely need to repeat in 2020.    3.  Medication Management:     1. We discussed that hx of Narcolepsy does limit his medication options. It is likely that fatigue drowsiness with most medications may be compounded because of this. He cannot take NSAIDs due to Crohns. Recommended he continue current medication regimen, including topicals.     4.  Potential procedures: lumbar epidural steroid injections have been very helpful over the last few years, the last one with least amount of benefit.  It is reasonable to repeat, max of 3-4 a year. Repeat lumbar epidural steroid injection ordered today, he will monitor pain benefit.     5.  Marcel has been working with a chiropractor with benefit but is interested in a different approach. Chiropractic care and acupuncture ordered today, monitor benefit.    6.  Follow up with FAIZAN Morrison CNP in 4 weeks.       Review of Electronic Chart: Today I have also reviewed available medical information in the patient's medical record at [place] ([facility]), including relevant provider notes, laboratory work, and imaging.       I spent 60 minutes of time face to face with the patient.  Greater than 50% of this time was spent in patient counseling and/or coordination of care regarding principles of multidisciplinary care, medication management, and treatment options as discussed above.      FAIZAN Morrison CNP  [facility]      patient reports CAH, does not appear internally preoccupied

## 2022-01-21 NOTE — ED BEHAVIORAL HEALTH ASSESSMENT NOTE - DESCRIPTION
alcohol use d/o currently street homeless, reports receiving SSI: [facility] Patient BIB self and ED consulted psychiatry to evaluate suicidality.  u/tox positive for cocaine and THC; bal 12;   Vital Signs Last 24 Hrs  T(C): 36.7 (20 Jan 2022 20:56), Max: 36.7 (20 Jan 2022 20:56)  T(F): 98 (20 Jan 2022 20:56), Max: 98 (20 Jan 2022 20:56)  HR: 86 (20 Jan 2022 20:56) (86 - 86)  BP: 152/88 (20 Jan 2022 20:56) (152/88 - 152/88)  BP(mean): --  RR: 19 (20 Jan 2022 20:56) (19 - 19)  SpO2: 98% (20 Jan 2022 20:56) (98% - 98%)

## 2022-01-21 NOTE — ED BEHAVIORAL HEALTH ASSESSMENT NOTE - CASE SUMMARY
Patient is a 61-year-old male, street homeless, , with pph of schizoaffective disorder, polysubstance abuse (etoh, cocaine, cannabis), documented hx of malingering, past admissions and was discharged from [facility] yesterday, frequent visits to emergency room with substance-induced complaint; he self presents to the ER for CAH and SI in context of substance relapse.     Patient was discharged 1 day ago from inpatient unit and at the time, determined there was a strong suspicion for secondary gain for housing. After discharge, pt relapsed on alcohol and crack/cocaine, did not follow up with shelter referral, and states he lost his meds. Now he is endorsing CAH to kill himself. He is fixated on admission and resistant to counseling about his substance use being one of primary drivers to mood dysregulation.  Patient likely with substance induced symptomatology combined with malingering for housing as he reports he is homeless and does not want to go to shelter placement.  Patient likely needs more time for substances to metabolize and show reduction in psychosis.  Would recommend hold in ed for re-assess after full sobriety and administration of meds.  Explore rehab (does not need detox as he was sober x 14 days during inpatient stay with 1 day relapse). However if patient recants he may be psychiatrically cleared for discharge at any time as this would be in line with known baseline and previous visits.

## 2022-01-21 NOTE — ED BEHAVIORAL HEALTH ASSESSMENT NOTE - HPI (INCLUDE ILLNESS QUALITY, SEVERITY, DURATION, TIMING, CONTEXT, MODIFYING FACTORS, ASSOCIATED SIGNS AND SYMPTOMS)
Patient is a 61-year-old male, street homeless, , history of alcohol use d/o, other substance use (cocaine, cannabis), ?psychotic disorder, frequent visits to emergency room with substance-induced complaint who presented himself to the ED with complaint of command AH to kill himself.    Patient was discharged from [facility] on 1/19/22 after an extended hospital stay due to concurrent covid and psychiatric complaints.  Patient detoxed from ETOH and abstained from crack/cocaine during that admission.  Restarted on Seroquel and Trazodone.  Patient subsequently showed improvement in symptomatology but strong suspician for malingering (homeless) during that stay due to inconsistencies during evaluations/testing/presentation.  Towards the end of his stay, he requested discharge and overall presented with improvement.  Reports upon discharge on 1/19/22, he did not go to referral shelter ([facility]) but instead, panhandled and got enough money to purchase a pint of Heaven and 50 dollars worth of Crack cocaine.  After using both substances, reports he "heard the voices to kill myself". He brought himself back  to this ED for re-admission.  He states "I can't go to a shelter, I don't trust the people there.  I would rather be in the hospital".  Per Lindsay, patient high utilizer of services, frequents multiple ED's in a month, sometimes on same day of month if not admitted in 1 ED, he presents to another.  Patient states "send me to rehab, admit me, I don't want to go to a shelter".      Per last visit:     Called mother at [phone] Paola Dorantes who related that patient does not live with her in [place]. Unknown last seen. She reported that she was unsure of psychiatric diagnosis and that he struggled with alcohol use. No known suicide attempts in the past. Hospitalized multiple times in the past.    [facility] ED team confirmed that patient came with wristband from another hospital. [facility] confirmed that patient was seen yesterday on 1/4/22 for similar complaint, possibly intoxicated at that time, reported suicidality and hearing CAH, and discharged the same day as he was determined not to require inpatient admission.    FHX: alcohol use d/o both parents

## 2022-01-21 NOTE — BH CONSULTATION LIAISON PROGRESS NOTE - NSBHATTESTNPTRAINEE_PSY_A_CORE
1110. Pt arrived at [facility] ambulatory and in no distress for transfusion of 2 units PRBCs. Assessment completed, no new complaints voiced. Port accessed with positive blood return noted. Signs/symptoms of adverse blood reaction discussed with pt, voiced understanding. Medications received:  NS @ KVO  2 unit PRBC    1155.:  1st unit PRBCs started and infusing without difficulty, observed x 15 minutes  1415:  1st unit completed without adverse reaction noted, NS flushing line. 1445.:  2nd unit PRBCs started and infusing without difficulty, observed x 15 minutes  1715:  2nd unit completed without adverse reaction noted, NS flushing line. Patient Vitals for the past 12 hrs:   Temp Pulse Resp BP SpO2   04/19/18 1715 97 °F (36.1 °C) (!) 53 14 149/88 -   04/19/18 1645 97 °F (36.1 °C) (!) 57 14 151/85 -   04/19/18 1545 97 °F (36.1 °C) 76 14 142/76 -   04/19/18 1515 97.8 °F (36.6 °C) (!) 51 16 125/76 -   04/19/18 1500 97.5 °F (36.4 °C) (!) 55 16 136/73 -   04/19/18 1438 97.4 °F (36.3 °C) (!) 55 16 135/73 -   04/19/18 1355 97.5 °F (36.4 °C) (!) 52 14 141/71 -   04/19/18 1255 97.3 °F (36.3 °C) (!) 54 16 129/76 -   04/19/18 1225 97 °F (36.1 °C) (!) 56 14 132/82 -   04/19/18 1210 97 °F (36.1 °C) (!) 56 16 135/80 -   04/19/18 1147 97.1 °F (36.2 °C) (!) 55 16 121/72 -   04/19/18 1110 97.3 °F (36.3 °C) 75 16 136/65 98 %       1720. Tolerated transfusion  well, no adverse reaction noted. D/C instructions reviewed, pt refused copy, voiced understanding. Patient declined 1 hour post transfusion observation. D/Cd from [facility] ambulatory and in no distress accompanied by spouse. agree

## 2022-01-21 NOTE — BH CONSULTATION LIAISON PROGRESS NOTE - NSBHCHARTREVIEWVS_PSY_A_CORE FT
Vital Signs Last 24 Hrs  T(C): 36.8 (21 Jan 2022 07:50), Max: 36.8 (21 Jan 2022 07:50)  T(F): 98.3 (21 Jan 2022 07:50), Max: 98.3 (21 Jan 2022 07:50)  HR: 86 (21 Jan 2022 07:50) (86 - 95)  BP: 126/76 (21 Jan 2022 07:50) (119/68 - 152/88)  BP(mean): --  RR: 18 (21 Jan 2022 07:50) (18 - 19)  SpO2: 97% (21 Jan 2022 07:50) (97% - 98%)

## 2022-01-21 NOTE — ED ADULT NURSE REASSESSMENT NOTE - NS ED NURSE REASSESS COMMENT FT1
psych at bedside for eval. Constant observation remains in place. All safety precautions maintained.

## 2022-01-21 NOTE — ED BEHAVIORAL HEALTH ASSESSMENT NOTE - RISK ASSESSMENT
Low Acute Suicide Risk He does carry multiple risk factors for suicide including his sex, age, substance use, , poor frustration tolerance. Protectively, is resilient, help-seeking. Affect did not reflect mood, organized presentation, few signs or symptoms of psychosis, and history of multiple presentations to ED for secondary gain give credence to suspicion that admission would not benefit patient. Nevertheless, given his statements about active SI in the context of substance intoxication vs withdrawal would recommend reassess after full sobriety.

## 2022-01-21 NOTE — BH CONSULTATION LIAISON PROGRESS NOTE - NSBHFUPINTERVALCCFT_PSY_A_CORE
"I don't want to go to the shelter - it's dangerous there. I need to be admitted to here or state hospital."

## 2022-01-21 NOTE — BH CONSULTATION LIAISON PROGRESS NOTE - NSBHFUPINTERVALHXFT_PSY_A_CORE
Upon reassessment, patient found sleeping in a hallway stretcher with 1:1 at bedside. Upon assessment, patient is calm and cooperative. Noted to be very concrete yet vague and inconsistent with describing symptoms and substance use. Would repeat "I'm hearing voices telling me to kill myself. I'm depressed and suicidal" to multiple questions. In no apparent distress, affect constricted when discussing symptoms. At first, denied cocaine use prior to admission (although had already told ED staff, also positive on utox), but then states he took "a little bit." Endorsed the Seroquel he took last night helped him sleep and lessen the severity of his symptoms. Also states he has had CAH/SI since he was 24, but that he felt better on the Seroquel he received inpatient. He is admission focused, saying he doesn't want to go to the shelter due to safety concerns. When discussing other options, patient agreed [facility]. Discussed he should alert police or shelter staff if he was feeling unsafe while there and return to the hospital if symptoms worsen. Patient amenable to discharge/safety plan and requested ED staff bring his belongings.  He also requested refills of his medications to hold him over until he is able to connect with his [facility].     Patient was recently admitted to [facility] 1/5 - 1/19. Collateral information from inpatient team reveals a major concern for malingering for secondary gain during his admission due to patient's inconsistent presentation and lack of clear mood disorder symptoms.

## 2022-01-21 NOTE — BH CONSULTATION LIAISON PROGRESS NOTE - CASE SUMMARY
60yo man, undomiciled, , in outpt care at the [facility], with a history of alcohol, cocaine use disorders, possible psychotic disorder, prior concern for neurocognitive impairment, significant documented past concern for malingered symptoms to obtain shelter/hospital admission, recently discharged from [facility] with concern for malingered sx during admission (1/5-1/19) who presents with vague and incongruous psychotic symptoms in the setting of reported alcohol and crack cocaine use following discharge. Pt notably concrete and perseverative on interview, voicing desire for “state hospitalization” and “inpatient care”, insistent that his problems are “psych, not alcohol” though agreeable that he felt better at the time of discharge from inpatient psychiatry and worse again after drinking and smoking crack. Pt consistently without any suicidal intent or formulated suicidal plan (though repeats theoretical means as if reciting a list); he is clearly future-oriented and help-seeking despite subjective severity of sx and is not objectively depressed or psychotic. Some improvement in sx may be attributed to substance metabolization in ED and administration of quetiapine. At this time, pt not assessed at acutely elevated risk of harm to self or others and is not anticipated to benefit from another inpatient psychiatric stay; while chronic risk factors including substance use and ?psychotic disorder, past SI are present, acute risk factor substance intoxication mitigated with observation in the ED and connection to substance treatment. Strongly encourage engagement in dual dx treatment; substance rehab options including how to [facility]. Emergency options including 911, nearest ED reviewed. Pt is psychiatrically cleared for discharge. DDx substance induced psychosis, alcohol use d/o, cocaine use d/o, neurocognitive d/o, concern for malingering

## 2022-01-21 NOTE — BH CONSULTATION LIAISON PROGRESS NOTE - NSBHADMITCOUNSELOTHER_PSY_A_CORE FT
counseling re substance use and treatment options  counseling re: importance of adherence with outpt psychiatric care

## 2022-01-31 DIAGNOSIS — E03.9 HYPOTHYROIDISM, UNSPECIFIED: ICD-10-CM

## 2022-01-31 DIAGNOSIS — R45.851 SUICIDAL IDEATIONS: ICD-10-CM

## 2022-01-31 DIAGNOSIS — Z72.89 OTHER PROBLEMS RELATED TO LIFESTYLE: ICD-10-CM

## 2022-01-31 DIAGNOSIS — Z86.19 PERSONAL HISTORY OF OTHER INFECTIOUS AND PARASITIC DISEASES: ICD-10-CM

## 2022-01-31 DIAGNOSIS — F25.0 SCHIZOAFFECTIVE DISORDER, BIPOLAR TYPE: ICD-10-CM

## 2022-01-31 DIAGNOSIS — F19.90 OTHER PSYCHOACTIVE SUBSTANCE USE, UNSPECIFIED, UNCOMPLICATED: ICD-10-CM

## 2022-01-31 DIAGNOSIS — Z76.5 MALINGERER [CONSCIOUS SIMULATION]: ICD-10-CM

## 2022-01-31 DIAGNOSIS — U07.1 COVID-19: ICD-10-CM

## 2022-02-11 ENCOUNTER — EMERGENCY (EMERGENCY)
Facility: HOSPITAL | Age: 62
LOS: 1 days | Discharge: HOME | End: 2022-02-11
Attending: EMERGENCY MEDICINE | Admitting: EMERGENCY MEDICINE
Payer: MEDICARE

## 2022-02-11 VITALS
DIASTOLIC BLOOD PRESSURE: 100 MMHG | TEMPERATURE: 98 F | RESPIRATION RATE: 18 BRPM | SYSTOLIC BLOOD PRESSURE: 171 MMHG | HEIGHT: 67 IN | HEART RATE: 101 BPM | WEIGHT: 199.96 LBS

## 2022-02-11 VITALS
HEART RATE: 86 BPM | SYSTOLIC BLOOD PRESSURE: 132 MMHG | DIASTOLIC BLOOD PRESSURE: 76 MMHG | OXYGEN SATURATION: 98 % | RESPIRATION RATE: 18 BRPM | TEMPERATURE: 98 F

## 2022-02-11 DIAGNOSIS — R21 RASH AND OTHER NONSPECIFIC SKIN ERUPTION: ICD-10-CM

## 2022-02-11 DIAGNOSIS — R45.851 SUICIDAL IDEATIONS: ICD-10-CM

## 2022-02-11 DIAGNOSIS — R44.0 AUDITORY HALLUCINATIONS: ICD-10-CM

## 2022-02-11 DIAGNOSIS — Z20.822 CONTACT WITH AND (SUSPECTED) EXPOSURE TO COVID-19: ICD-10-CM

## 2022-02-11 DIAGNOSIS — I10 ESSENTIAL (PRIMARY) HYPERTENSION: ICD-10-CM

## 2022-02-11 DIAGNOSIS — F20.9 SCHIZOPHRENIA, UNSPECIFIED: ICD-10-CM

## 2022-02-11 DIAGNOSIS — F19.94 OTHER PSYCHOACTIVE SUBSTANCE USE, UNSPECIFIED WITH PSYCHOACTIVE SUBSTANCE-INDUCED MOOD DISORDER: ICD-10-CM

## 2022-02-11 DIAGNOSIS — Z76.5 MALINGERER [CONSCIOUS SIMULATION]: ICD-10-CM

## 2022-02-11 DIAGNOSIS — E03.9 HYPOTHYROIDISM, UNSPECIFIED: ICD-10-CM

## 2022-02-11 DIAGNOSIS — F19.19 OTHER PSYCHOACTIVE SUBSTANCE ABUSE WITH UNSPECIFIED PSYCHOACTIVE SUBSTANCE-INDUCED DISORDER: ICD-10-CM

## 2022-02-11 DIAGNOSIS — F14.10 COCAINE ABUSE, UNCOMPLICATED: ICD-10-CM

## 2022-02-11 LAB
AMPHET UR-MCNC: NEGATIVE — SIGNIFICANT CHANGE UP
ANION GAP SERPL CALC-SCNC: 13 MMOL/L — SIGNIFICANT CHANGE UP (ref 5–17)
APAP SERPL-MCNC: <5 UG/ML — LOW (ref 10–30)
APPEARANCE UR: CLEAR — SIGNIFICANT CHANGE UP
BARBITURATES UR SCN-MCNC: NEGATIVE — SIGNIFICANT CHANGE UP
BASOPHILS # BLD AUTO: 0.02 K/UL — SIGNIFICANT CHANGE UP (ref 0–0.2)
BASOPHILS NFR BLD AUTO: 0.2 % — SIGNIFICANT CHANGE UP (ref 0–2)
BENZODIAZ UR-MCNC: NEGATIVE — SIGNIFICANT CHANGE UP
BILIRUB UR-MCNC: NEGATIVE — SIGNIFICANT CHANGE UP
BUN SERPL-MCNC: 15 MG/DL — SIGNIFICANT CHANGE UP (ref 7–23)
CALCIUM SERPL-MCNC: 9.9 MG/DL — SIGNIFICANT CHANGE UP (ref 8.4–10.5)
CHLORIDE SERPL-SCNC: 99 MMOL/L — SIGNIFICANT CHANGE UP (ref 96–108)
CO2 SERPL-SCNC: 20 MMOL/L — LOW (ref 22–31)
COCAINE METAB.OTHER UR-MCNC: POSITIVE
COLOR SPEC: YELLOW — SIGNIFICANT CHANGE UP
CREAT SERPL-MCNC: 0.86 MG/DL — SIGNIFICANT CHANGE UP (ref 0.5–1.3)
DIFF PNL FLD: NEGATIVE — SIGNIFICANT CHANGE UP
EOSINOPHIL # BLD AUTO: 0 K/UL — SIGNIFICANT CHANGE UP (ref 0–0.5)
EOSINOPHIL NFR BLD AUTO: 0 % — SIGNIFICANT CHANGE UP (ref 0–6)
ETHANOL SERPL-MCNC: <10 MG/DL — SIGNIFICANT CHANGE UP (ref 0–10)
GLUCOSE SERPL-MCNC: 106 MG/DL — HIGH (ref 70–99)
GLUCOSE UR QL: NEGATIVE — SIGNIFICANT CHANGE UP
HCT VFR BLD CALC: 37.3 % — LOW (ref 39–50)
HGB BLD-MCNC: 12.7 G/DL — LOW (ref 13–17)
IMM GRANULOCYTES NFR BLD AUTO: 0.5 % — SIGNIFICANT CHANGE UP (ref 0–1.5)
KETONES UR-MCNC: NEGATIVE — SIGNIFICANT CHANGE UP
LEUKOCYTE ESTERASE UR-ACNC: NEGATIVE — SIGNIFICANT CHANGE UP
LYMPHOCYTES # BLD AUTO: 1.43 K/UL — SIGNIFICANT CHANGE UP (ref 1–3.3)
LYMPHOCYTES # BLD AUTO: 12.9 % — LOW (ref 13–44)
MCHC RBC-ENTMCNC: 29.7 PG — SIGNIFICANT CHANGE UP (ref 27–34)
MCHC RBC-ENTMCNC: 34 GM/DL — SIGNIFICANT CHANGE UP (ref 32–36)
MCV RBC AUTO: 87.1 FL — SIGNIFICANT CHANGE UP (ref 80–100)
METHADONE UR-MCNC: NEGATIVE — SIGNIFICANT CHANGE UP
MONOCYTES # BLD AUTO: 1.05 K/UL — HIGH (ref 0–0.9)
MONOCYTES NFR BLD AUTO: 9.5 % — SIGNIFICANT CHANGE UP (ref 2–14)
NEUTROPHILS # BLD AUTO: 8.52 K/UL — HIGH (ref 1.8–7.4)
NEUTROPHILS NFR BLD AUTO: 76.9 % — SIGNIFICANT CHANGE UP (ref 43–77)
NITRITE UR-MCNC: NEGATIVE — SIGNIFICANT CHANGE UP
NRBC # BLD: 0 /100 WBCS — SIGNIFICANT CHANGE UP (ref 0–0)
OPIATES UR-MCNC: NEGATIVE — SIGNIFICANT CHANGE UP
PCP SPEC-MCNC: SIGNIFICANT CHANGE UP
PCP UR-MCNC: NEGATIVE — SIGNIFICANT CHANGE UP
PH UR: 6.5 — SIGNIFICANT CHANGE UP (ref 5–8)
PLATELET # BLD AUTO: 361 K/UL — SIGNIFICANT CHANGE UP (ref 150–400)
POTASSIUM SERPL-MCNC: 4.1 MMOL/L — SIGNIFICANT CHANGE UP (ref 3.5–5.3)
POTASSIUM SERPL-SCNC: 4.1 MMOL/L — SIGNIFICANT CHANGE UP (ref 3.5–5.3)
PROT UR-MCNC: NEGATIVE MG/DL — SIGNIFICANT CHANGE UP
RBC # BLD: 4.28 M/UL — SIGNIFICANT CHANGE UP (ref 4.2–5.8)
RBC # FLD: 15.6 % — HIGH (ref 10.3–14.5)
SALICYLATES SERPL-MCNC: <0.3 MG/DL — LOW (ref 2.8–20)
SARS-COV-2 RNA SPEC QL NAA+PROBE: SIGNIFICANT CHANGE UP
SODIUM SERPL-SCNC: 132 MMOL/L — LOW (ref 135–145)
SP GR SPEC: 1.01 — SIGNIFICANT CHANGE UP (ref 1–1.03)
THC UR QL: NEGATIVE — SIGNIFICANT CHANGE UP
UROBILINOGEN FLD QL: 2 E.U./DL
WBC # BLD: 11.08 K/UL — HIGH (ref 3.8–10.5)
WBC # FLD AUTO: 11.08 K/UL — HIGH (ref 3.8–10.5)

## 2022-02-11 PROCEDURE — 81003 URINALYSIS AUTO W/O SCOPE: CPT

## 2022-02-11 PROCEDURE — 93010 ELECTROCARDIOGRAM REPORT: CPT

## 2022-02-11 PROCEDURE — 85025 COMPLETE CBC W/AUTO DIFF WBC: CPT

## 2022-02-11 PROCEDURE — 99285 EMERGENCY DEPT VISIT HI MDM: CPT | Mod: 25

## 2022-02-11 PROCEDURE — U0005: CPT

## 2022-02-11 PROCEDURE — 80307 DRUG TEST PRSMV CHEM ANLYZR: CPT

## 2022-02-11 PROCEDURE — 74177 CT ABD & PELVIS W/CONTRAST: CPT | Mod: 26,MA

## 2022-02-11 PROCEDURE — 36415 COLL VENOUS BLD VENIPUNCTURE: CPT

## 2022-02-11 PROCEDURE — 74177 CT ABD & PELVIS W/CONTRAST: CPT | Mod: MA

## 2022-02-11 PROCEDURE — 90792 PSYCH DIAG EVAL W/MED SRVCS: CPT

## 2022-02-11 PROCEDURE — U0003: CPT

## 2022-02-11 PROCEDURE — 80048 BASIC METABOLIC PNL TOTAL CA: CPT

## 2022-02-11 PROCEDURE — 93005 ELECTROCARDIOGRAM TRACING: CPT

## 2022-02-11 NOTE — ED BEHAVIORAL HEALTH ASSESSMENT NOTE - DETAILS
pt did not cooperate fully. to kill self discussed dispo reports FHx of alcohol use d/o seen at [facility] endorses CAH after crack/cocaine use

## 2022-02-11 NOTE — ED ADULT NURSE REASSESSMENT NOTE - NS ED NURSE REASSESS COMMENT FT1
Patient care received from previous RN.  Patient asleep, not in any pain, SOB or distress, 1:1 sitter ongoing, SI precautions, belongings secured, in gown.  Vital signs stable.  Labs sent.  Psychiatry consult pending.

## 2022-02-11 NOTE — ED ADULT TRIAGE NOTE - CHIEF COMPLAINT QUOTE
Patient reports he is hearing voices telling him to kill himself, ongoing x 1 week.  Takes Seroquel and Trazodone, last taken 3 days ago.  Denies any alcohol or illicit drug use.  Calm and cooperative at time of triage.  Patient reports he is homeless, poor personal hygiene.  R inner thigh noted to have red/swollen area.

## 2022-02-11 NOTE — ED BEHAVIORAL HEALTH ASSESSMENT NOTE - RISK ASSESSMENT
Moderate Acute Suicide Risk multiple risk factors for suicide including his sex, age, substance use, , poor frustration tolerance. Protectively, is resilient, help-seeking. Affect did not reflect mood, organized presentation, few signs or symptoms of psychosis, and history of multiple presentations to ED for secondary gain give credence to suspicion that admission would not benefit patient.

## 2022-02-11 NOTE — ED ADULT NURSE REASSESSMENT NOTE - NS ED NURSE REASSESS COMMENT FT1
Patient /aoX3, no verbalization of SI or any other symptom complaint, vital signs stable.  Cleared for safe discharge to home by Psychiatry and Dr. Valenzuela.  Barrier creams given to patient.  New clothing given to patient.  Discharged in stable condition.

## 2022-02-11 NOTE — ED PROVIDER NOTE - CLINICAL SUMMARY MEDICAL DECISION MAKING FREE TEXT BOX
Patient in ED w concern for SI, hearing voices and rash to bilateral inner thighs.  Patient non toxic, no overlying crepitance or palpable abscess/induration to thighs/groin.  Psych clearance ordered and in addition CT abd/pel ordered to eval for possible deep abscess, GAS, etc. Patient in ED w concern for SI, hearing voices and rash to bilateral inner thighs.  Patient non toxic, no overlying crepitance or palpable abscess/induration to thighs/groin.  Psych clearance ordered and in addition CT abd/pel ordered to eval for possible deep abscess, GAS, etc.    Update @ 1723 - CT reviewed and without evidence of GAS or abscess formation.  No leukocytosis.  Area is cleansed and barrier cream placed - no sign of secondary infection at this time.  Patient is medically cleared and psych in ED to evaluate patient.  Psych team noting patient stable for discharge with outpatient psych follow up - patient given information by psych team.  Patient agreeable to plan for discharge with outpatient follow up.  He is aware of recommendation for wound check and continued use of barrier cream as no sign of secondary infection at this time.  He is given cream in ED and educated regarding its use.  Patient is advised to follow up with their PCP in 1-2 days without fail.  Patient instructed to return to ED immediately should their symptoms worsen or if there is any concern prior to the recommended PCP follow up.  Patient is aware of plan and verbalizes their understanding.  Will discharge home at this time.

## 2022-02-11 NOTE — ED BEHAVIORAL HEALTH ASSESSMENT NOTE - DESCRIPTION
alcohol use d/o pt was calm and cooperative on 1:1 currently street homeless, reports receiving SSI: [facility] on 1/19/22

## 2022-02-11 NOTE — ED PROVIDER NOTE - PATIENT PORTAL LINK FT
You can access the FollowMyHealth Patient Portal offered by [facility] by registering at the following website: [email or web address]. By joining [facility]’s FollowMyHealth portal, you will also be able to view your health information using other applications (apps) compatible with our system.

## 2022-02-11 NOTE — ED ADULT NURSE NOTE - HPI (INCLUDE ILLNESS QUALITY, SEVERITY, DURATION, TIMING, CONTEXT, MODIFYING FACTORS, ASSOCIATED SIGNS AND SYMPTOMS)
Pt states he is having auditory hallucinations telling him to kill himself x1 wk. Pt also reports a reddened region with inflammation to his left groin.

## 2022-02-11 NOTE — ED PROVIDER NOTE - PHYSICAL EXAMINATION
VITAL SIGNS: I have reviewed nursing notes and confirm.  CONSTITUTIONAL: Non toxic; in no acute distress.   SKIN:  + Erythema and superficial skin breakdown/dermatitis to bilateral proximal inner thighs.  No overlying induration or crepitance.  No extension of rash to testicles or base of penis/penile shaft.  HEAD:  Normocephalic, atraumatic.  EYES: EOM intact; conjunctiva and sclera clear.  ENT: No nasal discharge; airway clear.   NECK: Supple; non tender.  CARD: S1, S2 normal; no murmurs, gallops, or rubs. Regular rate and rhythm.   RESP:  Clear to auscultation b/l, no wheezes, rales or rhonchi.  ABD: Normal bowel sounds; soft; non-distended; non-tender; no guarding/rebound.  EXT: Normal ROM. No clubbing, cyanosis or edema. 2+ pulses to b/l ue/le.  NEURO: Alert, oriented, grossly unremarkable.  5/5 strength x 4 extremities against gravity and external force.  No drift x 4 extremities.  Sensation intact and symmetric x 4 extremities.  No facial asymmetry.    PSYCH: Cooperative, mood and affect appropriate.  Endorsing SI and "hearing voices."

## 2022-02-11 NOTE — ED ADULT NURSE NOTE - ACTIVATING EVENTS/STRESSORS
Pending incarceration or homelessness/Change in provider or treatment (i.e., medications, psychotherapy, milieu)/Acute medical problem/Inadequate social supports

## 2022-02-11 NOTE — ED PROVIDER NOTE - NSFOLLOWUPINSTRUCTIONS_ED_ALL_ED_FT
Please follow up with your primary physician in 1-2 days for re evaluation.  Please return to ER immediately should your symptoms worsen or if you have any concern prior to this recommended follow up.          Dermatitis    WHAT YOU NEED TO KNOW:    Dermatitis is skin inflammation. You may have an itchy rash, redness, or swelling. You may also have bumps or blisters that crust over or ooze clear fluid. Dermatitis can be caused by allergens such as dust mites, pet dander, pollen, and certain foods. It can also develop when something touches your skin and irritates it or causes an allergic reaction. Examples include soaps, chemicals, latex, and poison ivy.    DISCHARGE INSTRUCTIONS:    Call your local emergency number (911 in the [place]) or have someone call if:   •You have symptoms of anaphylaxis, such as sudden trouble breathing, throat swelling, or feeling dizzy or lightheaded.          Return to the emergency department if:   •You develop a fever or have red streaks going up your arm or leg.      •Your rash gets more swollen, red, or hot.      Call your doctor or dermatologist if:   •Your skin blisters, oozes white or yellow pus, or has a foul-smelling discharge.      •Your rash spreads or does not get better, even after treatment.      •You have questions or concerns about your condition or care.      Medicines:   •Medicines help decrease itching and inflammation, or treat a bacterial infection. They may be given as a topical cream, shot, or a pill.      •Take your medicine as directed. Contact your healthcare provider if you think your medicine is not helping or if you have side effects. Tell him of her if you are allergic to any medicine. Keep a list of the medicines, vitamins, and herbs you take. Include the amounts, and when and why you take them. Bring the list or the pill bottles to follow-up visits. Carry your medicine list with you in case of an emergency.      Manage dermatitis:   •Apply a cool compress to your rash. This will help soothe your skin.      •Apply lotions or creams to the area. These help keep your skin moist and decrease itching. Apply the lotion or cream right after a lukewarm bath or shower when your skin is still damp. Use products that do not contain dye or a scent.      •Avoid skin irritants. Examples include makeup, hair products, soaps, and cleansers. Use products that do not contain a scent or dye.      Follow up with your doctor or dermatologist as directed: Write down your questions so you remember to ask them during your visits.       © Copyright [employer] 2022           back to top                          © Copyright [employer] 2022

## 2022-02-11 NOTE — ED PROVIDER NOTE - OBJECTIVE STATEMENT
61 year old undomiciled male presents to ED with concern for hearing voices and suicidal ideations x 1 week.  Patient does not have a plan to harm himself.  He also complains of rash to bilateral upper inner thighs after defecating on himself several days ago.  Patient notes area is causing him pain and when he legs rub together it feels difficult to walk due to this discomfort.  Patient denies associated fever, chills, headache, visual changes, chest pain, shortness of breath, abdominal pain, nausea, emesis, changes to bowel movements, peripheral edema, calf pain/tenderness, recent travel, known sick contacts or any additional acute complaints or concerns at this time. 61 year old undomiciled male Schizophrenia, HTN, hypothyroidism, hepatitis C, drug abuse presents to ED with concern for hearing voices and suicidal ideations x 1 week.  Patient does not have a plan to harm himself.  He also complains of rash to bilateral upper inner thighs after defecating on himself several days ago.  Patient notes area is causing him pain and when he legs rub together it feels difficult to walk due to this discomfort.  Patient denies associated fever, chills, headache, visual changes, chest pain, shortness of breath, abdominal pain, nausea, emesis, changes to bowel movements, peripheral edema, calf pain/tenderness, recent travel, known sick contacts or any additional acute complaints or concerns at this time.

## 2022-02-11 NOTE — ED ADULT NURSE NOTE - SUICIDE RISK FACTORS
Hopelessness or despair/Command hallucinations/Agitation/Severe Anxiety/Panic/Mood Disorder current/past/Psychotic disorder current/past

## 2022-02-11 NOTE — ED BEHAVIORAL HEALTH ASSESSMENT NOTE - SUMMARY
Patient is a 61-year-old male, street homeless, , history of alcohol use d/o, other substance use (cocaine, cannabis), ?psychotic disorder, frequent visits to emergency room with substance-induced complaint who presented himself to the ED with complaint of command AH to kill himself.  Patient likely with substance induced symptomatology combined with malingering for housing as he reports he is homeless and does not want to go to shelter placement.  Pt has history of utilizing ED for secondary gain with minimal benefit from inpatient hospitalizations. Pt is chronically non compliant with recommendations for outpatient treatment and substance abuse treatment. Pt is at chronic risk for self injurious behavior given chronic substance use, non compliance with care, homelessness. He however is unlikely to benefit from inpatient psychiatric admission. Case discussed with Dr. Weiner and Dr. Patrick who participated in pt's prior care while in the ED and 8 uris, who both confirmed that pt's presentation was most consistent with malingering and substance induced mood symptoms.

## 2022-02-11 NOTE — ED PROVIDER NOTE - NS ED ROS FT
Constitutional: No fever or chills.   Eyes: No pain, blurry vision, or discharge.  ENMT: No hearing changes, pain, discharge or infections. No neck pain or stiffness.  Cardiac: No chest pain, SOB or edema. No chest pain with exertion.  Respiratory: No cough or respiratory distress. No hemoptysis. No history of asthma or RAD.  GI: No nausea, vomiting, diarrhea or abdominal pain.  : No dysuria, frequency or burning.  MS: No myalgia, muscle weakness, joint pain or back pain.  Neuro: No headache or weakness. No LOC.  Skin: + Skin rash to inner thighs.  Psych:  + SI, + auditory hallucinations.

## 2022-02-11 NOTE — ED BEHAVIORAL HEALTH ASSESSMENT NOTE - HPI (INCLUDE ILLNESS QUALITY, SEVERITY, DURATION, TIMING, CONTEXT, MODIFYING FACTORS, ASSOCIATED SIGNS AND SYMPTOMS)
Patient is a 61-year-old male, street homeless, , history of alcohol use d/o, other substance use (cocaine, cannabis), ?psychotic disorder, frequent visits to emergency room with substance-induced complaint who presented himself to the ED with complaint of command AH to kill himself. He also complains of rash to bilateral upper inner thighs after defecating on himself several days ago. On exam pt is requesting to be admitted to the hospital, stating he is having CAH. Pt is concrete, stating he has no recollection of being seen in the ED on 1/20. He states he has not followed up with any recommendations for outpatient follow up. Appears to have minimal motivation for ongoing psychiatric treatment. Admission focused.   Does not appear to be internally preoccupied. Agreeable to substance abuse referrals when informed that he was not going to be admitted.     Pt was seen at [facility] with similar presentation on 1/20 and was found to be likely malingering, [facility]   As per prior notes: "Patient was discharged from [facility] on 1/19/22 after an extended hospital stay due to concurrent covid and psychiatric complaints.  Patient detox-ed from ETOH and abstained from crack/cocaine during that admission.  Restarted on Seroquel and Trazodone.  Patient subsequently showed improvement in symptomatology but strong suspicion for malingering (homeless) during that stay due to inconsistencies during evaluations/testing/presentation.  Towards the end of his stay, he requested discharge and overall presented with improvement.  Reports upon discharge on 1/19/22, he did not go to referral shelter [facility]) but instead, panhandled and got enough money to purchase a pint of Heaven and 50 dollars worth of Crack cocaine.  After using both substances, reports he "heard the voices to kill myself". He brought himself back  to this ED for re-admission.  He states "I can't go to a shelter, I don't trust the people there.  I would rather be in the hospital".  Per [facility], patient high utilizer of services, frequents multiple ED's in a month, sometimes on same day of month if not admitted in 1 ED, he presents to another."  Per last visit:     [facility] confirmed that patient was seen on 1/4/22 for similar complaint, possibly intoxicated at that time, reported suicidality and hearing CAH, and discharged the same day as he was determined not to require inpatient admission.    FHX: alcohol use d/o both parents

## 2022-03-06 ENCOUNTER — EMERGENCY (EMERGENCY)
Facility: HOSPITAL | Age: 62
LOS: 1 days | Discharge: HOME | End: 2022-03-06
Attending: EMERGENCY MEDICINE | Admitting: EMERGENCY MEDICINE
Payer: MEDICARE

## 2022-03-06 VITALS
DIASTOLIC BLOOD PRESSURE: 75 MMHG | TEMPERATURE: 97 F | HEIGHT: 67 IN | RESPIRATION RATE: 18 BRPM | OXYGEN SATURATION: 97 % | HEART RATE: 74 BPM | SYSTOLIC BLOOD PRESSURE: 106 MMHG

## 2022-03-06 PROCEDURE — 99284 EMERGENCY DEPT VISIT MOD MDM: CPT

## 2022-03-06 NOTE — ED ADULT NURSE NOTE - CHIEF COMPLAINT QUOTE
pt. picked up from [facility] for alcohol intoxication, pt. endorses drinking a pint of alcohol. No visible injuries noted.

## 2022-03-06 NOTE — ED ADULT TRIAGE NOTE - CHIEF COMPLAINT QUOTE
pt. picked up from [facility] for alcohol intoxication, pt. endrses drinking a pint of alcohol. No visible injuries noted.

## 2022-03-07 VITALS
TEMPERATURE: 97 F | OXYGEN SATURATION: 96 % | SYSTOLIC BLOOD PRESSURE: 139 MMHG | HEART RATE: 79 BPM | RESPIRATION RATE: 16 BRPM | DIASTOLIC BLOOD PRESSURE: 80 MMHG

## 2022-03-07 DIAGNOSIS — F10.929 ALCOHOL USE, UNSPECIFIED WITH INTOXICATION, UNSPECIFIED: ICD-10-CM

## 2022-03-07 DIAGNOSIS — E03.9 HYPOTHYROIDISM, UNSPECIFIED: ICD-10-CM

## 2022-03-07 DIAGNOSIS — I10 ESSENTIAL (PRIMARY) HYPERTENSION: ICD-10-CM

## 2022-03-07 DIAGNOSIS — Z59.00 HOMELESSNESS UNSPECIFIED: ICD-10-CM

## 2022-03-07 DIAGNOSIS — R41.82 ALTERED MENTAL STATUS, UNSPECIFIED: ICD-10-CM

## 2022-03-07 SDOH — ECONOMIC STABILITY - HOUSING INSECURITY: HOMELESSNESS UNSPECIFIED: Z59.00

## 2022-03-07 NOTE — ED PROVIDER NOTE - OBJECTIVE STATEMENT
61 year old undomiciled male Schizophrenia, HTN, hypothyroidism, hepatitis C, drug abuse. BIBEMS for altered mental status, suspicion for alcohol intoxication. no signs of trauma.

## 2022-03-07 NOTE — ED PROVIDER NOTE - PATIENT PORTAL LINK FT
You can access the FollowMyHealth Patient Portal offered by [facility] by registering at the following website: [email or web address]. By joining [employer]’s FollowMyHealth portal, you will also be able to view your health information using other applications (apps) compatible with our system.

## 2022-03-07 NOTE — ED PROVIDER NOTE - MDM ORDERS SUBMITTED SELECTION
Progress Note    Red Rule Applied    Service Provided:  The patient verbally consented to a zoom video visit. Writer was on the zoom video visit with the patient for a total of 60 minutes for individual therapy.  This visit was performed via live interactive two-way video.    Clinician Location: Writer's confidential personal home office   Patient Location: Patient's home        Relevant History and Session Note:  Bj reports that he just completed his will and trust and needs to have a notary sign it. He states last week sat, 10/3 he had his family over to remember his late wife. He states feeling like he is managing things but that he and Michael get frustrated at each other at times due to it taking longer for him to find a comfortable sleep position.     Progress relevant to last session: stable    Interventions:   Therapist took CBT and Person-centered approach, engaged in empathic listening and promoting positive self-regard as it relates to patients presenting problem of grief and relational issues.  The writer had the patient process his thoughts and feelings and they were validated.   We talked about trying to take a break from his son when needed, even a 5 min cool down period. He states he wants his son to be happy and hopes he can find love one day too.    Bj presented today as:   Behavior: cooperative  Eye Contact: appropriate   Speech: logical and coherent  Attention:  adequate   Gait, movement and Motor Coordination: Within normal limits  Alert and Oriented: Yes, to Person, Place, and Time  Mood/Affect: reflective, slightly down  Thought Process: logical and coherent   Cognitive Performance:  Normal limits  Insight and Judgment: within normal limits for age   Self-Harm: Denied   Current Suicidal Ideation: Suicidal ideation, plan, or intent reported? Denied  Homicidal Ideation: Homicidal ideation, plan, or intent reported?  Denied         Diagnosis:  Dysthymic Disorder  Anxiety  unspecified      Treatment Plan:   Goals:  1. Process through thoughts and feelings about new relationship and fears    Interventions:  1. Interpersonal therapy to process things  2. CBT to improve mood    Barriers: The patient has lost several loved ones    Recommendations/Plan:  1. I will continue to follow Bj regularly to provide training in development of better coping strategies in relation to his issues of relational issues.    2. Follow up in 2-3 week(s).  3. Bj will go to the nearest emergency room or call 9-1-1 if he ever reports feelings of suicidality or if they believe he may be a threat to self or others.  Bj will inform Dr. Thornton or other support if he feels unsafe.   4. He was advised to utilize the strategies talked about above.      Thank you for the opportunity to participate in the care of Bj. Please feel free to contact me at [phone] should you have any questions about my recommendations.      Electronically Signed by:    Sury Villegas, PH.D , 10/7/2020  Licensed Clinical Psychologist  [facility]  Phone: [phone][           Not Applicable

## 2022-03-11 NOTE — ED PROVIDER NOTE - NS ED MD DISPO ADMIT [facility] PALLIATIVE CARE
Mother is returning a phone call from the office. Please give her a call back  
This MA returned a patient's call requesting appointment for circumcision consultation. Visit was scheduled, mother denies any questions at this time.  
NONE

## 2022-03-14 NOTE — ED BEHAVIORAL HEALTH ASSESSMENT NOTE - HAVE YOU BEEN THINKING ABOUT HOW YOU MIGHT DO THIS?
I performed a history and physical exam of the patient and discussed their management with the resident. I reviewed the resident's note and agree with the documented findings and plan of care.  Sofia Whitaker MD
Yes

## 2022-03-22 ENCOUNTER — EMERGENCY (EMERGENCY)
Facility: HOSPITAL | Age: 62
LOS: 1 days | Discharge: HOME | End: 2022-03-22
Attending: EMERGENCY MEDICINE | Admitting: EMERGENCY MEDICINE
Payer: MEDICARE

## 2022-03-22 VITALS
SYSTOLIC BLOOD PRESSURE: 118 MMHG | OXYGEN SATURATION: 95 % | HEIGHT: 67 IN | TEMPERATURE: 97 F | DIASTOLIC BLOOD PRESSURE: 74 MMHG | HEART RATE: 90 BPM | RESPIRATION RATE: 18 BRPM | WEIGHT: 199.96 LBS

## 2022-03-22 LAB — GLUCOSE BLDC GLUCOMTR-MCNC: 183 MG/DL — HIGH (ref 70–99)

## 2022-03-22 PROCEDURE — 99284 EMERGENCY DEPT VISIT MOD MDM: CPT

## 2022-03-22 NOTE — ED PROVIDER NOTE - CLINICAL SUMMARY MEDICAL DECISION MAKING FREE TEXT BOX
62 y/o M presents to the ED s/p alcohol intoxication. On exam, Pt appears well and in no acute distress. Will give food and discharge afterwards.

## 2022-03-22 NOTE — ED PROVIDER NOTE - OBJECTIVE STATEMENT
60 y/o M with PMH of alcohol abuse presents to the ED via EMS for AMS. Pt admits he drank 2 bottles of Heaven. He states he is no longer drunk. Pt did not want to come to the ED initially. Pt currently requesting food.

## 2022-03-22 NOTE — ED ADULT NURSE REASSESSMENT NOTE - NS ED NURSE REASSESS COMMENT FT1
Pt received from previous shift RN sleeping, pt intoxicated. Pending MTF.  Alarms active and audible. Will continue to monitor.
Pt ambulatory w/ a steady gait, spon resps on Ra unlabored, NAD.  Pt given food and dc paperwork.

## 2022-03-22 NOTE — ED ADULT NURSE NOTE - OBJECTIVE STATEMENT
BIBA for alcohol intoxication. no obvious signs of injury/trauma noted. awaiting provider evaluation

## 2022-03-22 NOTE — ED ADULT NURSE NOTE - NS ED NURSE LEVEL OF CONSCIOUSNESS MENTAL STATUS
Attempted to call patient again with no success. Left message for patient to call clinic.  Please advise: Breast tissue and nipple from 5/15/17 procedure were benign.  It patient does not return call she can be notified on 5/22/17 in clinic apt   Awake/Alert

## 2022-03-22 NOTE — ED ADULT NURSE NOTE - NSIMPLEMENTINTERV_GEN_ALL_ED
Implemented All Fall Risk Interventions:  [place] to call system. Call bell, personal items and telephone within reach. Instruct patient to call for assistance. Room bathroom lighting operational. Non-slip footwear when patient is off stretcher. Physically safe environment: no spills, clutter or unnecessary equipment. Stretcher in lowest position, wheels locked, appropriate side rails in place. Provide visual cue, wrist band, yellow gown, etc. Monitor gait and stability. Monitor for mental status changes and reorient to person, place, and time. Review medications for side effects contributing to fall risk. Reinforce activity limits and safety measures with patient and family.

## 2022-03-22 NOTE — ED PROVIDER NOTE - PATIENT PORTAL LINK FT
You can access the FollowMyHealth Patient Portal offered by [facility] by registering at the following website: [email or web address]. By joining [email or web address]’s FollowMyHealth portal, you will also be able to view your health information using other applications (apps) compatible with our system.

## 2022-03-24 DIAGNOSIS — E03.9 HYPOTHYROIDISM, UNSPECIFIED: ICD-10-CM

## 2022-03-24 DIAGNOSIS — R41.82 ALTERED MENTAL STATUS, UNSPECIFIED: ICD-10-CM

## 2022-03-24 DIAGNOSIS — I10 ESSENTIAL (PRIMARY) HYPERTENSION: ICD-10-CM

## 2022-03-24 DIAGNOSIS — F10.20 ALCOHOL DEPENDENCE, UNCOMPLICATED: ICD-10-CM

## 2022-07-31 ENCOUNTER — EMERGENCY (EMERGENCY)
Facility: HOSPITAL | Age: 62
LOS: 1 days | Discharge: HOME | End: 2022-07-31
Attending: EMERGENCY MEDICINE | Admitting: EMERGENCY MEDICINE

## 2022-07-31 VITALS
OXYGEN SATURATION: 92 % | HEART RATE: 80 BPM | TEMPERATURE: 98 F | SYSTOLIC BLOOD PRESSURE: 121 MMHG | DIASTOLIC BLOOD PRESSURE: 77 MMHG | RESPIRATION RATE: 16 BRPM | HEIGHT: 67 IN

## 2022-07-31 DIAGNOSIS — F10.129 ALCOHOL ABUSE WITH INTOXICATION, UNSPECIFIED: ICD-10-CM

## 2022-07-31 DIAGNOSIS — E03.9 HYPOTHYROIDISM, UNSPECIFIED: ICD-10-CM

## 2022-07-31 DIAGNOSIS — F20.9 SCHIZOPHRENIA, UNSPECIFIED: ICD-10-CM

## 2022-07-31 DIAGNOSIS — I10 ESSENTIAL (PRIMARY) HYPERTENSION: ICD-10-CM

## 2022-07-31 PROCEDURE — 99220: CPT

## 2022-07-31 NOTE — ED CDU PROVIDER DISPOSITION NOTE - PATIENT PORTAL LINK FT
You can access the FollowMyHealth Patient Portal offered by [facility] by registering at the following website: [email or web address]. By joining [employer]’s FollowMyHealth portal, you will also be able to view your health information using other applications (apps) compatible with our system.

## 2022-07-31 NOTE — ED PROVIDER NOTE - CLINICAL SUMMARY MEDICAL DECISION MAKING FREE TEXT BOX
Patient here with apparent isolated EtOH intoxication. No e/o head trauma. FS wnl. Will observe until more awake, alert, steady and with a safe plan for d/c.

## 2022-07-31 NOTE — ED CDU PROVIDER DISPOSITION NOTE - NSFOLLOWUPINSTRUCTIONS_ED_ALL_ED_FT
Alcohol Intoxication    WHAT YOU NEED TO KNOW:    Alcohol intoxication is a harmful physical condition caused when you drink more alcohol than your body can handle. It is also called ethanol poisoning, or being drunk.    DISCHARGE INSTRUCTIONS:    Call your local emergency number (911 in the ) if:   •You have sudden trouble breathing or chest pain.      •You have a seizure.      •You feel sad enough to harm yourself or others.      Call your doctor if:   •You have hallucinations (you see or hear things that are not real).      •You cannot stop vomiting.      •You have questions or concerns about your condition or care.      Recommended alcohol limits:   •Men 21 to 64 years should limit alcohol to 2 drinks a day. Do not have more than 4 drinks in 1 day or more than 14 in 1 week.      •All women, and men 65 or older should limit alcohol to 1 drink in a day. Do not have more than 3 drinks in 1 day or more than 7 in 1 week. No amount of alcohol is okay during pregnancy.      Manage alcohol use:   •Decrease the amount you drink. This can help prevent health problems such as brain, heart, and liver damage, high blood pressure, diabetes, and cancer. If you cannot stop completely, healthcare providers can help you set goals to decrease the amount you drink.      •Plan weekly alcohol use. You will be less likely to drink more than the recommended limit if you plan ahead.      •Have food when you drink alcohol. Food will prevent alcohol from getting into your system too quickly. Eat before you have your first alcohol drink.      •Time your drinks carefully. Have no more than 1 drink in an hour. Have a liquid such as water, coffee, or a soft drink between alcohol drinks.      •Do not drive if you have had alcohol. Make sure someone who has not been drinking can help you get home.      •Do not drink alcohol if you are taking medicine. Alcohol is dangerous when you combine it with certain medicines, such as acetaminophen or blood pressure medicine. Talk to your healthcare provider about all the medicines you currently take.      For more information:   •Alcoholics Anonymous  Web Address: http://www.aa.org      •Substance Abuse and Mental Health Services Administration  [address]  [place] [address]  Web Address: http://www.[facility].gov        Follow up with your healthcare provider as directed: Write down your questions so you remember to ask them during your visits.

## 2022-08-02 NOTE — BH INPATIENT PSYCHIATRY PROGRESS NOTE - NSTXCOPEDATETRGT_PSY_ALL_CORE
US at the bedside.  
US aware  
13-Jan-2022
20-Jan-2022
13-Jan-2022
13-Jan-2022
20-Jan-2022
20-Jan-2022

## 2022-08-31 ENCOUNTER — EMERGENCY (EMERGENCY)
Facility: HOSPITAL | Age: 62
LOS: 1 days | Discharge: HOME | End: 2022-08-31
Attending: EMERGENCY MEDICINE | Admitting: EMERGENCY MEDICINE

## 2022-08-31 VITALS
DIASTOLIC BLOOD PRESSURE: 90 MMHG | SYSTOLIC BLOOD PRESSURE: 143 MMHG | RESPIRATION RATE: 17 BRPM | HEART RATE: 97 BPM | HEIGHT: 67 IN | TEMPERATURE: 98 F | OXYGEN SATURATION: 97 %

## 2022-08-31 VITALS
SYSTOLIC BLOOD PRESSURE: 129 MMHG | OXYGEN SATURATION: 97 % | RESPIRATION RATE: 16 BRPM | DIASTOLIC BLOOD PRESSURE: 79 MMHG | TEMPERATURE: 98 F | HEART RATE: 87 BPM

## 2022-08-31 LAB — GLUCOSE BLDC GLUCOMTR-MCNC: 118 MG/DL — HIGH (ref 70–99)

## 2022-08-31 PROCEDURE — 99220: CPT

## 2022-08-31 NOTE — ED ADULT NURSE NOTE - OBJECTIVE STATEMENT
Patient reports ETOH use/ intoxication. Patient denies CP, SOB, NAD noted. Patient denies N/V/D. Patient denies fall or injury. Patient states he needs food and a stretcher.

## 2022-08-31 NOTE — ED CDU PROVIDER DISPOSITION NOTE - NSFOLLOWUPINSTRUCTIONS_ED_ALL_ED_FT
Alcohol Intoxication    WHAT YOU NEED TO KNOW:    Alcohol intoxication is a harmful physical condition caused when you drink more alcohol than your body can handle. It is also called ethanol poisoning, or being drunk.    DISCHARGE INSTRUCTIONS:    Call your local emergency number (911 in the ) if:   •You have sudden trouble breathing or chest pain.      •You have a seizure.      •You feel sad enough to harm yourself or others.      Call your doctor if:   •You have hallucinations (you see or hear things that are not real).      •You cannot stop vomiting.      •You have questions or concerns about your condition or care.      Recommended alcohol limits:   •Men 21 to 64 years should limit alcohol to 2 drinks a day. Do not have more than 4 drinks in 1 day or more than 14 in 1 week.      •All women, and men 65 or older should limit alcohol to 1 drink in a day. Do not have more than 3 drinks in 1 day or more than 7 in 1 week. No amount of alcohol is okay during pregnancy.      Manage alcohol use:   •Decrease the amount you drink. This can help prevent health problems such as brain, heart, and liver damage, high blood pressure, diabetes, and cancer. If you cannot stop completely, healthcare providers can help you set goals to decrease the amount you drink.      •Plan weekly alcohol use. You will be less likely to drink more than the recommended limit if you plan ahead.      •Have food when you drink alcohol. Food will prevent alcohol from getting into your system too quickly. Eat before you have your first alcohol drink.      •Time your drinks carefully. Have no more than 1 drink in an hour. Have a liquid such as water, coffee, or a soft drink between alcohol drinks.      •Do not drive if you have had alcohol. Make sure someone who has not been drinking can help you get home.      •Do not drink alcohol if you are taking medicine. Alcohol is dangerous when you combine it with certain medicines, such as acetaminophen or blood pressure medicine. Talk to your healthcare provider about all the medicines you currently take.      For more information:   •Alcoholics Anonymous  Web Address: http://www.aa.org      •Substance Abuse and Mental Health Services Administration  [address]  [place] [address]  Web Address: http://www.[facility].gov        Follow up with your healthcare provider as directed: Write down your questions so you remember to ask them during your visits.

## 2022-08-31 NOTE — ED ADULT NURSE NOTE - NSIMPLEMENTINTERV_GEN_ALL_ED
Implemented All Fall Risk Interventions:  [place] to call system. Call bell, personal items and telephone within reach. Instruct patient to call for assistance. Room bathroom lighting operational. Non-slip footwear when patient is off stretcher. Physically safe environment: no spills, clutter or unnecessary equipment. Stretcher in lowest position, wheels locked, appropriate side rails in place. Provide visual cue, wrist band, yellow gown, etc. Monitor gait and stability. Monitor for mental status changes and reorient to person, place, and time. Review medications for side effects contributing to fall risk. Reinforce activity limits and safety measures with patient and family.

## 2022-08-31 NOTE — ED ADULT TRIAGE NOTE - CHIEF COMPLAINT QUOTE
Pt undomiciled BIBA for ams. Pt admits to alcohol use, denies drug use, pain or injuries. No obvious injuries noted. Pt requesting food and stretcher to "sleep".

## 2022-08-31 NOTE — ED CDU PROVIDER DISPOSITION NOTE - PATIENT PORTAL LINK FT
You can access the FollowMyHealth Patient Portal offered by [facility] by registering at the following website: [email or web address]. By joining [employer]’s FollowMyHealth portal, you will also be able to view your health information using other applications (apps) compatible with our system.

## 2022-09-03 DIAGNOSIS — R41.82 ALTERED MENTAL STATUS, UNSPECIFIED: ICD-10-CM

## 2022-09-03 DIAGNOSIS — F10.129 ALCOHOL ABUSE WITH INTOXICATION, UNSPECIFIED: ICD-10-CM
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
## 2022-09-23 ENCOUNTER — EMERGENCY (EMERGENCY)
Facility: HOSPITAL | Age: 62
LOS: 1 days | Discharge: HOME | End: 2022-09-23
Attending: STUDENT IN AN ORGANIZED HEALTH CARE EDUCATION/TRAINING PROGRAM | Admitting: STUDENT IN AN ORGANIZED HEALTH CARE EDUCATION/TRAINING PROGRAM
Payer: MEDICARE

## 2022-09-23 VITALS
RESPIRATION RATE: 18 BRPM | OXYGEN SATURATION: 97 % | DIASTOLIC BLOOD PRESSURE: 82 MMHG | TEMPERATURE: 98 F | SYSTOLIC BLOOD PRESSURE: 147 MMHG | HEART RATE: 77 BPM

## 2022-09-23 VITALS
WEIGHT: 199.96 LBS | HEART RATE: 98 BPM | TEMPERATURE: 98 F | SYSTOLIC BLOOD PRESSURE: 176 MMHG | HEIGHT: 67 IN | DIASTOLIC BLOOD PRESSURE: 95 MMHG | RESPIRATION RATE: 16 BRPM | OXYGEN SATURATION: 98 %

## 2022-09-23 DIAGNOSIS — F19.94 OTHER PSYCHOACTIVE SUBSTANCE USE, UNSPECIFIED WITH PSYCHOACTIVE SUBSTANCE-INDUCED MOOD DISORDER: ICD-10-CM

## 2022-09-23 LAB
ALBUMIN SERPL ELPH-MCNC: 4.5 G/DL — SIGNIFICANT CHANGE UP (ref 3.3–5)
ALP SERPL-CCNC: 95 U/L — SIGNIFICANT CHANGE UP (ref 40–120)
ALT FLD-CCNC: 78 U/L — HIGH (ref 10–45)
AMPHET UR-MCNC: NEGATIVE — SIGNIFICANT CHANGE UP
ANION GAP SERPL CALC-SCNC: 17 MMOL/L — SIGNIFICANT CHANGE UP (ref 5–17)
APAP SERPL-MCNC: <5 UG/ML — LOW (ref 10–30)
AST SERPL-CCNC: 60 U/L — HIGH (ref 10–40)
BARBITURATES UR SCN-MCNC: NEGATIVE — SIGNIFICANT CHANGE UP
BASOPHILS # BLD AUTO: 0.02 K/UL — SIGNIFICANT CHANGE UP (ref 0–0.2)
BASOPHILS NFR BLD AUTO: 0.2 % — SIGNIFICANT CHANGE UP (ref 0–2)
BENZODIAZ UR-MCNC: NEGATIVE — SIGNIFICANT CHANGE UP
BILIRUB SERPL-MCNC: 0.6 MG/DL — SIGNIFICANT CHANGE UP (ref 0.2–1.2)
BUN SERPL-MCNC: 18 MG/DL — SIGNIFICANT CHANGE UP (ref 7–23)
CALCIUM SERPL-MCNC: 9.4 MG/DL — SIGNIFICANT CHANGE UP (ref 8.4–10.5)
CHLORIDE SERPL-SCNC: 98 MMOL/L — SIGNIFICANT CHANGE UP (ref 96–108)
CO2 SERPL-SCNC: 21 MMOL/L — LOW (ref 22–31)
COCAINE METAB.OTHER UR-MCNC: POSITIVE
CREAT SERPL-MCNC: 0.71 MG/DL — SIGNIFICANT CHANGE UP (ref 0.5–1.3)
EGFR: 103 ML/MIN/1.73M2 — SIGNIFICANT CHANGE UP
EOSINOPHIL # BLD AUTO: 0.04 K/UL — SIGNIFICANT CHANGE UP (ref 0–0.5)
EOSINOPHIL NFR BLD AUTO: 0.4 % — SIGNIFICANT CHANGE UP (ref 0–6)
ETHANOL SERPL-MCNC: <10 MG/DL — SIGNIFICANT CHANGE UP (ref 0–10)
GLUCOSE SERPL-MCNC: 114 MG/DL — HIGH (ref 70–99)
HCT VFR BLD CALC: 41.4 % — SIGNIFICANT CHANGE UP (ref 39–50)
HGB BLD-MCNC: 13.8 G/DL — SIGNIFICANT CHANGE UP (ref 13–17)
IMM GRANULOCYTES NFR BLD AUTO: 0.2 % — SIGNIFICANT CHANGE UP (ref 0–0.9)
LYMPHOCYTES # BLD AUTO: 2.19 K/UL — SIGNIFICANT CHANGE UP (ref 1–3.3)
LYMPHOCYTES # BLD AUTO: 23.7 % — SIGNIFICANT CHANGE UP (ref 13–44)
MCHC RBC-ENTMCNC: 31.4 PG — SIGNIFICANT CHANGE UP (ref 27–34)
MCHC RBC-ENTMCNC: 33.3 GM/DL — SIGNIFICANT CHANGE UP (ref 32–36)
MCV RBC AUTO: 94.3 FL — SIGNIFICANT CHANGE UP (ref 80–100)
METHADONE UR-MCNC: NEGATIVE — SIGNIFICANT CHANGE UP
MONOCYTES # BLD AUTO: 0.81 K/UL — SIGNIFICANT CHANGE UP (ref 0–0.9)
MONOCYTES NFR BLD AUTO: 8.7 % — SIGNIFICANT CHANGE UP (ref 2–14)
NEUTROPHILS # BLD AUTO: 6.18 K/UL — SIGNIFICANT CHANGE UP (ref 1.8–7.4)
NEUTROPHILS NFR BLD AUTO: 66.8 % — SIGNIFICANT CHANGE UP (ref 43–77)
NRBC # BLD: 0 /100 WBCS — SIGNIFICANT CHANGE UP (ref 0–0)
OPIATES UR-MCNC: NEGATIVE — SIGNIFICANT CHANGE UP
PCP SPEC-MCNC: SIGNIFICANT CHANGE UP
PCP UR-MCNC: NEGATIVE — SIGNIFICANT CHANGE UP
PLATELET # BLD AUTO: 307 K/UL — SIGNIFICANT CHANGE UP (ref 150–400)
POTASSIUM SERPL-MCNC: 4.3 MMOL/L — SIGNIFICANT CHANGE UP (ref 3.5–5.3)
POTASSIUM SERPL-SCNC: 4.3 MMOL/L — SIGNIFICANT CHANGE UP (ref 3.5–5.3)
PROT SERPL-MCNC: 8.4 G/DL — HIGH (ref 6–8.3)
RBC # BLD: 4.39 M/UL — SIGNIFICANT CHANGE UP (ref 4.2–5.8)
RBC # FLD: 13.3 % — SIGNIFICANT CHANGE UP (ref 10.3–14.5)
SALICYLATES SERPL-MCNC: <0.3 MG/DL — LOW (ref 2.8–20)
SARS-COV-2 RNA SPEC QL NAA+PROBE: NEGATIVE — SIGNIFICANT CHANGE UP
SODIUM SERPL-SCNC: 136 MMOL/L — SIGNIFICANT CHANGE UP (ref 135–145)
THC UR QL: NEGATIVE — SIGNIFICANT CHANGE UP
WBC # BLD: 9.26 K/UL — SIGNIFICANT CHANGE UP (ref 3.8–10.5)
WBC # FLD AUTO: 9.26 K/UL — SIGNIFICANT CHANGE UP (ref 3.8–10.5)

## 2022-09-23 PROCEDURE — 99285 EMERGENCY DEPT VISIT HI MDM: CPT

## 2022-09-23 PROCEDURE — 80307 DRUG TEST PRSMV CHEM ANLYZR: CPT

## 2022-09-23 PROCEDURE — 90792 PSYCH DIAG EVAL W/MED SRVCS: CPT | Mod: 95

## 2022-09-23 PROCEDURE — 85025 COMPLETE CBC W/AUTO DIFF WBC: CPT

## 2022-09-23 PROCEDURE — 87635 SARS-COV-2 COVID-19 AMP PRB: CPT

## 2022-09-23 PROCEDURE — 36415 COLL VENOUS BLD VENIPUNCTURE: CPT

## 2022-09-23 PROCEDURE — 93010 ELECTROCARDIOGRAM REPORT: CPT

## 2022-09-23 PROCEDURE — 93005 ELECTROCARDIOGRAM TRACING: CPT

## 2022-09-23 PROCEDURE — 80053 COMPREHEN METABOLIC PANEL: CPT

## 2022-09-23 PROCEDURE — 99284 EMERGENCY DEPT VISIT MOD MDM: CPT

## 2022-09-23 RX ORDER — QUETIAPINE FUMARATE 200 MG/1
100 TABLET, FILM COATED ORAL ONCE
Refills: 0 | Status: COMPLETED | OUTPATIENT
Start: 2022-09-23 | End: 2022-09-23

## 2022-09-23 RX ADMIN — QUETIAPINE FUMARATE 100 MILLIGRAM(S): 200 TABLET, FILM COATED ORAL at 06:17

## 2022-09-23 NOTE — ED PROVIDER NOTE - NSFOLLOWUPINSTRUCTIONS_ED_ALL_ED_FT
Hallucinations    WHAT YOU NEED TO KNOW:    Hallucinations are things you see, hear, feel, taste, or smell that seem real but are not. Some hallucinations are temporary. Hallucinations that continue, interfere with daily activities, or worsen may be a sign of a serious medical or mental condition that needs treatment.    DISCHARGE INSTRUCTIONS:    Call 911 if you or someone else notices any of the following:   •You want to harm yourself or someone else.    •You hear voices telling you to harm yourself or someone else.    •You have a seizure.    •You are confused, do not know where you are, or are not making sense when you speak.    Return to the emergency department if:   •Your hallucinations get worse.    •You vomit several times in a row.    •Your heartbeat or breathing is faster or slower than usual.    •You have trouble breathing or shortness of breath.    Contact your healthcare provider if:   •You have new hallucinations.    •You have questions or concerns about your condition or care.    Medicines:   •Medicines may be given to stop the hallucinations, reduce anxiety, or relax your muscles.    •Take your medicine as directed. Contact your healthcare provider if you think your medicine is not helping or if you have side effects. Tell your provider if you are allergic to any medicine. Keep a list of the medicines, vitamins, and herbs you take. Include the amounts, and when and why you take them. Bring the list or the pill bottles to follow-up visits. Carry your medicine list with you in case of an emergency.    Follow up with your healthcare provider as directed: Write down your questions so you remember to ask them during your visits.

## 2022-09-23 NOTE — ED BEHAVIORAL HEALTH ASSESSMENT NOTE - DETAILS
to kill self n/a d/w ED attending [facility] alcohol use disorder per chart pt reports OD ~2 months ago on #5 pills, reports going to train tracks today

## 2022-09-23 NOTE — ED BEHAVIORAL HEALTH ASSESSMENT NOTE - NSPRESENTSXS_PSY_ALL_CORE
Depressed mood/Anhedonia/Command hallucinations to hurt self/Refusal or inability to complete safety plan

## 2022-09-23 NOTE — BH CONSULTATION LIAISON PROGRESS NOTE - NSBHCHARTREVIEWLAB_PSY_A_CORE FT
13.8   9.26  )-----------( 307      ( 23 Sep 2022 09:39 )             41.4   09-23    136  |  98  |  18  ----------------------------<  114<H>  4.3   |  21<L>  |  0.71    Ca    9.4      23 Sep 2022 02:33    TPro  8.4<H>  /  Alb  4.5  /  TBili  0.6  /  DBili  x   /  AST  60<H>  /  ALT  78<H>  /  AlkPhos  95  09-23    Cocaine Metabolite, Urine: Positive (09.23.22 @ 02:34)

## 2022-09-23 NOTE — BH CONSULTATION LIAISON PROGRESS NOTE - OTHER
not formally assessed reports CAH telling him to jump in front of train concrete with decreased flow of thoughts tardive-like motions of mouth, choreic motions in feet noted limited effort fair based on recurrently seeking help for distressing symptoms, though impaired re: recurrent substance use loosely organized, concrete with decreased flow of thoughts monotonous tone reports CAH telling him to jump in front of train. Does not appear internally preoccupied/to be responding to internal stimuli

## 2022-09-23 NOTE — ED PROVIDER NOTE - PROGRESS NOTE DETAILS
psych consulted pt evaluated by telepsych - recommend seroquel 100mg and pt will be reevaluated in the morning Signout received: 62M with HTN schizophrenia polysubstance abuse, now nonadherent to medication x 1 month and hearing voices with SI, pending psych evaluation in AM. Patient cleared by psychiatry team.  Labs without any acute abnormality.  Plan to discharge home with return precautions and outpatient followup.

## 2022-09-23 NOTE — BH CONSULTATION LIAISON PROGRESS NOTE - NSBHASSESSMENTFT_PSY_ALL_CORE
Patient is a 62-year-old male; street homeless; ; hx of polysubstance use (alcohol, cocaine, cannabis); self-reported PPHx of schizophrenia, personality disorder, depression, prior admissions, prior SA by OD; PMHx of hep C, hypothyroidism; BIBS; psychiatry consulted for SI/CAH. Pt has history of numerous prior similar presentations with concern for substance-induced symptomatology and malingering. [facility] Dorantes was discharged 2 days ago on the 21st after an 11 day stay. His discharge diagnosis was feigning symptoms for secondary gain--discharge summary notes that he denies symptoms after external needs are met.     Patient's presentation is consistent with substance-induced mood disorder. Given his multiple inpatient stays without improvement in symptoms (patient has pattern of using cocaine immediately after discharge), inpatient psychiatric admission is unlikely to help him at this time. Patient has chronic suicidality in setting of substance use and does not present an acute suicide risk at this time.    RECOMMENDATIONS  1. Patient is psychiatrically clear for discharge  2. Please provide outpatient substance use resources upon discharge  3. [facility]    Patient is a 62-year-old male; street homeless; ; hx of polysubstance use (alcohol, cocaine, cannabis); self-reported PPHx of schizophrenia, personality disorder, depression, prior admissions, prior SA by OD; PMHx of hep C, hypothyroidism; BIBS; psychiatry consulted for SI/CAH. Pt has history of numerous prior similar presentations with concern for substance-induced symptomatology and malingering. [facility] Mr. Dorantes was discharged 2 days ago on the 21st after an 11 day stay. His discharge diagnosis was feigning symptoms for secondary gain--discharge summary notes that he denies symptoms after external needs are met.     Patient's presentation is consistent with substance-induced mood disorder vs. personality disorder with chronic suicidality. Given his multiple inpatient stays without improvement in symptoms (patient has pattern of using cocaine immediately after discharge), inpatient psychiatric admission is unlikely to help him at this time. Patient has chronic suicidality in setting of substance use and does not present an acute suicide risk at this time.    RECOMMENDATIONS  1. Patient is psychiatrically clear for discharge  2. Please provide outpatient substance use resources upon discharge  3. Patient [facility]    Patient is a 62-year-old male; street homeless; ; hx of polysubstance use (alcohol, cocaine, cannabis); self-reported PPHx of schizophrenia, personality disorder, depression, prior admissions, prior SA by OD; PMHx of hep C, hypothyroidism; BIBS; psychiatry consulted for SI/CAH. Pt has history of numerous prior similar presentations with concern for substance-induced symptomatology and malingering. [facility] Mr. Dorantes was discharged 2 days ago on the 21st after an 11 day stay. His discharge diagnosis was feigning symptoms for secondary gain--discharge summary notes that he denies symptoms after external needs are met.     Patient's presentation is consistent with substance-induced mood disorder vs. personality disorder with chronic suicidality. Given his multiple inpatient stays without improvement in symptoms (patient has pattern of using cocaine immediately after discharge), inpatient psychiatric admission is unlikely to help him at this time. Patient has chronic suicidality in setting of substance use and does not present an acute suicide risk at this time, though chronic risk is elevated.    RECOMMENDATIONS  1. Patient is psychiatrically clear for discharge  2. Please provide outpatient substance use resources upon discharge  3. Patient [facility]

## 2022-09-23 NOTE — ED PROVIDER NOTE - NSFOLLOWUPCLINICS_GEN_ALL_ED_FT
[facility]  Family Medicine  [address], [address]  [place], [place] [address]  Phone: [phone]  Fax:

## 2022-09-23 NOTE — BH CONSULTATION LIAISON PROGRESS NOTE - NSBHFUPINTERVALHXFT_PSY_A_CORE
No overnight events. On approach, patient is alert and cooperative with interview. Interview limited by effort and cognition.     This morning Mr. Dorantes reports that he is feeling depressed and hearing voices telling him to jump in front of train. He reports that he went to the train station but didn't jump because he is afraid of dying. He follows the [employer] apryl. He endorses not having any family, and that he used 2 pints of vodka and $20 worth of cocaine 2 days ago.  He reports that he went to detox one month ago at [facility]. He responds with "I don't know" when asked day, month, year, and the president. He says "I don't know" when asked to repeat three words, though he is able to repeat them when given one by one.     [facility] Mr[facility] Dorantes was discharged 2 days ago on the 21st after an 11 day stay. His discharge diagnosis was feigning symptoms for secondary gain--discharge summary notes that he denies symptoms after external needs are met. His discharge medications are Keppra 500 qday, Seroquel 400 qhs, and Trazodone 100 qhs. [facility] Dial, who is not currently available. No overnight events. On approach, patient is alert and cooperative with interview. Interview limited by effort and cognition.     This morning Mr. Dorantes reports that he is feeling depressed and hearing voices telling him to jump in front of train. He reports that he went to the train station but didn't jump because he is afraid of dying. He follows the [employer]an apryl. He endorses not having any family, and that he used 2 pints of vodka and $20 worth of cocaine 2 days ago.  He reports that he went to detox one month ago at [facility]. He responds with "I don't know" when asked day, month, year, and the president. He says "I don't know" when asked to repeat three words, though he is able to repeat them when given one by one. He denies history of seizure, withdrawal or otherwise.    [facility] Mr. Dorantes was discharged 2 days ago on the 21st after an 11 day stay. His discharge diagnosis was feigning symptoms for secondary gain--discharge summary notes that he denies symptoms after external needs are met. His discharge medications are Keppra 500 qday, Seroquel 400 qhs, and Trazodone 100 qhs. [facility] Dial, who is not currently available. No overnight events. On approach, patient is alert and cooperative with interview. Interview limited by effort and cognition.     This morning Mr. Dorantes reports that he is feeling depressed and hearing voices telling him to jump in front of train. He reports that he went to the train station but didn't jump because he is afraid of dying. He follows the Denominational apryl. He endorses not having any family, and that he used 2 pints of vodka and $20 worth of cocaine 2 days ago.  He reports that he went to detox one month ago at [facility]. He responds with "I don't know" when asked day, month, year, and the president. He says "I don't know" when asked to repeat three words, though he is able to repeat them when given one by one. He denies history of seizure, withdrawal or otherwise. In the  he was in supplies and denies active combat role or history of traumatic brain injury.    [facility] Mr. Dorantes was discharged 2 days ago on the 21st after an 11 day stay. His discharge diagnosis was feigning symptoms for secondary gain--discharge summary notes that he denies symptoms after external needs are met. His discharge medications are Keppra 500 qday, Seroquel 400 qhs, and Trazodone 100 qhs. [facility] Dial, who is not currently available.

## 2022-09-23 NOTE — ED ADULT NURSE NOTE - OBJECTIVE STATEMENT
Pt is a 62yoM presenting to the ED for pscyh consult as patient is endorsing auditory hallucinations. Pt is awake and alert, spont unlabored breathing, patient ambulated well. Pt states that the voices are violent and has been telling him to hurt himself, denies active plan. Denies HI, no acute pain. Patient is calm and has been cooperative with assessment.

## 2022-09-23 NOTE — ED BEHAVIORAL HEALTH ASSESSMENT NOTE - HPI (INCLUDE ILLNESS QUALITY, SEVERITY, DURATION, TIMING, CONTEXT, MODIFYING FACTORS, ASSOCIATED SIGNS AND SYMPTOMS)
Patient is a 62-year-old male; street homeless; ; hx of polysubstance use (alcohol, cocaine, cannabis); self-reported PPHx of schizophrenia, personality disorder, depression, prior admissions, prior SA by OD; PMHx of hep C, hypothyroidism; BIBS; psychiatry consulted for SI/CAH.  Pt states he is hearing voices telling him to kill himself.  He also reported that he is "depressed, suicidal, going to jump in front of a train."  He reports having these thoughts for the past week and states he came in today because he was getting ready to jump in front of the train and reports he was on the tracks but stopped because he is "scared to die."  Pt reports insomnia, reports feeling "tired and weak," denies anhedonia, normal appetite.  He describes the CAH as a male voice that "sounds like the devil."  He also reports paranoia.  Pt initially denies being at other hospitals but when asked about multiple wristbands, he states that he wasn't admitted because there were no beds.  He states he wants to be admitted so he doesn't jump in front of the train.  He reports daily alcohol use, marijuana use 1x/week, crack use (initially states last 1 week ago then states a few days ago when confronted with positive utox).  He reports hx of withdrawal vomiting/shakes but denies hx of withdrawal seizures.  He does not provide any collateral contacts.    Covid Screen - Patient  testing? denies positive test in past 3 months  vaccine? received 1 dose  exposures? denies

## 2022-09-23 NOTE — BH CONSULTATION LIAISON PROGRESS NOTE - ORIENTATION
patient says "I don't know" to all orientation questions about time, including year, knows he is at [facility]

## 2022-09-23 NOTE — ED PROVIDER NOTE - NSCAREINITIATED _GEN_ER
DATE OF VISIT:  02/18/2017    SUBJECTIVE:  The patient is seen and examined.  The patient is resting comfortably.  The patient was ready to go home today.  When I went to evaluate her, she was wheezing quite a bit and she does not have any respiratory distress.  I talked to the daughter.  We decided that at this point given her advanced age and other medical issues at this point, would be a good idea to just keep her another day.  We will try some neb treatments and monitor and try to decrease her oxygen needs.  Otherwise, no acute events from nursing staff report overnight.    OBJECTIVE:    VITAL SIGNS:  Temperature 97.8, pulse 85, respirations 16, /74, O2 saturation 97% on 3 L nasal cannula.     GENERAL:  The patient is awake, alert.  Otherwise her daughter also thinks that she is clearing up and going back to her regular mental status.     CVS:  Regular rate and rhythm.     PULMONARY:  Bilateral wheezing.     GI/ABDOMEN:  Positive bowel sounds.  Soft.     EXTREMITIES:  No sign of edema or cyanosis.    LABORATORY DATA:  No CBC or chemistries done today.  Previous one reviewed.    ASSESSMENT AND PLAN:    1. Pneumonia treating as community-acquired in nature, bacterial in nature, unknown pathogen.  At this time, the patient continues to improve, but she was having wheezing today.  Not sure if this is just mucous plugging or a new pathology just developing.  I am going to try just conservative management first.  We will hold off on steroids.  Will try some neb treatments.  I will continue with the antibiotics.  2. Influenza A.  Continue with completion of course of Tamiflu.  3. Atrial fibrillation with rapid ventricular response, improved.  Patient is back to regular on auscultation.  Continue patient's rate controlling medication and anticoagulation.  4. Acute-on-chronic encephalopathy, improving.  This was due to patient's infection on top of her underlying dementia.  5. Coronary artery disease.   Continue medical management.  6. Dyslipidemia.  Continue home medication statin therapy.  7. Falls and physical deconditioning due to advanced age.  Continue physical therapy while in the hospital.  I suggest as outpatient also but family thinks that they do not need physical therapy at home.  8. Chronic hypoxic respiratory failure.  Continue with oxygen.  I will decrease the O2 per nasal cannula to come back to home baseline.  We will drop the O2 to 2 L at this time.  9. Diarrhea most likely secondary to antibiotic use.  C diff was negative.  10. DVT/GI prophylaxis.  Continue with SCDs.  Work with physical therapy.  The patient is on anticoagulation with Coumadin, supratherapeutic today.  Patient is on oral diet.  11. Disposition.  Due to wheezing I would like to monitor the patient another day.  We will continue antibiotics antiviral. We will reassess tomorrow.  We will try some neb treatments.  Family seems to be comfortable with that plan.        __________________________  Montserrat Recinos MD  [id number]      D:  02/18/2017 13:22:52  T:  02/18/2017 13:45:26   AQ/modl   Job:  [id number]          Mariah Stauffer(Attending)

## 2022-09-23 NOTE — ED BEHAVIORAL HEALTH ASSESSMENT NOTE - DIFFERENTIAL
substance-induced mood/psychotic disorder, schizophrenia, personality disorder, adjustment disorder, malingering

## 2022-09-23 NOTE — BH CONSULTATION LIAISON PROGRESS NOTE - NSBHCHARTREVIEWVS_PSY_A_CORE FT
Vital Signs Last 24 Hrs  T(C): 36.7 (23 Sep 2022 08:30), Max: 36.8 (23 Sep 2022 01:37)  T(F): 98 (23 Sep 2022 08:30), Max: 98.2 (23 Sep 2022 01:37)  HR: 76 (23 Sep 2022 08:30) (75 - 98)  BP: 133/84 (23 Sep 2022 08:30) (130/82 - 176/95)  BP(mean): 98 (23 Sep 2022 07:52) (98 - 98)  RR: 18 (23 Sep 2022 08:30) (16 - 18)  SpO2: 98% (23 Sep 2022 08:30) (98% - 98%)    Parameters below as of 23 Sep 2022 07:52  Patient On (Oxygen Delivery Method): room air

## 2022-09-23 NOTE — ED ADULT NURSE REASSESSMENT NOTE - NS ED NURSE REASSESS COMMENT FT1
Received patient. patient is stable at chair. no distress. continued to 1:1 for safety.
Patient remains calm and awaiting psych consult at this time. Pt is resting on chair, cooperative, no acute distress. Remains on 1:1 for SI.

## 2022-09-23 NOTE — ED PROVIDER NOTE - PATIENT PORTAL LINK FT
You can access the FollowMyHealth Patient Portal offered by [facility] by registering at the following website: [email or web address]. By joining [employer]’s FollowMyHealth portal, you will also be able to view your health information using other applications (apps) compatible with our system.

## 2022-09-23 NOTE — ED ADULT TRIAGE NOTE - CHIEF COMPLAINT QUOTE
pt c/o SI, +auditory hallucinations, - visual hallucinations. "the voices are telling me to jump in front of a train." denies HI. pt c/o SI, +auditory hallucinations, - visual hallucinations. "the voices are telling me to jump in front of a train." denies HI. pt undressed, placed in yellow gown, ekg complete, security called to bedside for wanding. 1:1 in place

## 2022-09-23 NOTE — ED PROVIDER NOTE - OBJECTIVE STATEMENT
62M hx htn, hypothyroid, schizophrenia, hep C, c/o hearing voices. pt states he has not taken his seroquel or trazodone in the past month.  pt states voices are telling him to kill himself by jumping in front of a train. pt states last drank yesterday. pt with frequent visits to ED for SI.

## 2022-09-23 NOTE — ED ADULT NURSE NOTE - CHIEF COMPLAINT QUOTE
pt c/o SI, +auditory hallucinations, - visual hallucinations. "the voices are telling me to jump in front of a train." denies HI. pt undressed, placed in yellow gown, ekg complete, security called to bedside for wanding. 1:1 in place

## 2022-09-23 NOTE — BH CONSULTATION LIAISON PROGRESS NOTE - NSBHATTESTCOMMENTATTENDFT_PSY_A_CORE
63yo man, undomiciled, [employer] , in outpt care at the [facility], with a history of alcohol, cocaine use disorders, possible psychotic disorder, prior concern for neurocognitive impairment/?TBI, ?of seizure disorder (on Keppra as outpt), no known h/o suicide attempts, significant documented past concern for malingered symptoms to obtain shelter/hospital admission, just discharged from the [facility] 9/21/22 with discharge diagnosis of malingering, past admission to [facility] with concern for malingered sx during admission (1/5-1/19/22), multiple past ED visits with alcohol/other substance intoxication, who presents at his psychiatric baseline (known to writer from multiple past ED visits), with vague psychotic symptoms and chronic SI in setting of recent alcohol and cocaine use. Pt presents with concrete thought process and perseverative speech, focused on need for psychiatric admission due to “hearing voices to kill [himself]”; pt responded with some version of this phrase to unrelated questions, with notable difficulty shifting tasks or thinking abstractly, short term memory deficits also observed consistent with a neurocognitive d/o rather than a psychotic d/o. Pt acknowledges a history of no change in symptoms with past inpatient care or medications; history of lack of improvement with inpatient care and concern for fabricated symptoms for secondary gain confirmed with recent past inpatient treaters on [facility] and the [facility]. Pt cites fear of death and Sabianism beliefs as protective factors, and is receptive to counseling about substance resources and [facility]. Risk mitigated by observation in the ED and connection to substance use and outpt psychiatric services, as well as administration of dose of antipsychotic. No current indication for inpatient level of care; pt not assessed at acutely elevated risk of harm to self or others though chronic risk elevated, with a demonstrated ability to seek help when in crisis. Pt is psychiatrically stable for discharge.    DDx: substance induced mood/psychotic disorder, neurocognitive d/o, alcohol, cocaine use d/o, likely component of malingering

## 2022-09-23 NOTE — ED BEHAVIORAL HEALTH ASSESSMENT NOTE - SUMMARY
Patient is a 62-year-old male; street homeless; ; hx of polysubstance use (alcohol, cocaine, cannabis); self-reported PPHx of schizophrenia, personality disorder, depression, prior admissions, prior SA by OD; PMHx of hep C, hypothyroidism; BIBS; psychiatry consulted for SI/CAH.  Pt currently reporting SI/CAH and is unable to participate in safety planning at this time.  Pt with multiple prior similar presentations with concern for substance-induced symptomatology and malingering.  Will hold pt for longitudinal observation and to allow any substances to metabolize further.

## 2022-09-23 NOTE — ED BEHAVIORAL HEALTH ASSESSMENT NOTE - RISK ASSESSMENT
Moderate Acute Suicide Risk risk factors: male, substance use, +SI, +CAH, undomiciled, limited social support, not compliant with meds     protective factors: help seeking, denies access to guns

## 2022-09-26 DIAGNOSIS — R45.851 SUICIDAL IDEATIONS: ICD-10-CM

## 2022-09-26 DIAGNOSIS — I10 ESSENTIAL (PRIMARY) HYPERTENSION: ICD-10-CM

## 2022-09-26 DIAGNOSIS — F19.94 OTHER PSYCHOACTIVE SUBSTANCE USE, UNSPECIFIED WITH PSYCHOACTIVE SUBSTANCE-INDUCED MOOD DISORDER: ICD-10-CM

## 2022-09-26 DIAGNOSIS — B19.20 UNSPECIFIED VIRAL HEPATITIS C WITHOUT HEPATIC COMA: ICD-10-CM

## 2022-09-26 DIAGNOSIS — Z20.822 CONTACT WITH AND (SUSPECTED) EXPOSURE TO COVID-19: ICD-10-CM

## 2022-09-26 DIAGNOSIS — F32.A DEPRESSION, UNSPECIFIED: ICD-10-CM

## 2022-09-26 DIAGNOSIS — F60.9 PERSONALITY DISORDER, UNSPECIFIED: ICD-10-CM

## 2022-09-26 DIAGNOSIS — F17.200 NICOTINE DEPENDENCE, UNSPECIFIED, UNCOMPLICATED: ICD-10-CM

## 2022-09-26 DIAGNOSIS — F20.9 SCHIZOPHRENIA, UNSPECIFIED: ICD-10-CM

## 2022-09-26 DIAGNOSIS — E03.9 HYPOTHYROIDISM, UNSPECIFIED: ICD-10-CM

## 2022-09-26 DIAGNOSIS — Z59.00 HOMELESSNESS UNSPECIFIED: ICD-10-CM

## 2022-09-26 SDOH — ECONOMIC STABILITY - HOUSING INSECURITY: HOMELESSNESS UNSPECIFIED: Z59.00

## 2022-09-27 ENCOUNTER — EMERGENCY (EMERGENCY)
Facility: HOSPITAL | Age: 62
LOS: 1 days | Discharge: HOME | End: 2022-09-27
Attending: EMERGENCY MEDICINE | Admitting: EMERGENCY MEDICINE

## 2022-09-27 VITALS
DIASTOLIC BLOOD PRESSURE: 83 MMHG | TEMPERATURE: 98 F | HEART RATE: 89 BPM | SYSTOLIC BLOOD PRESSURE: 137 MMHG | OXYGEN SATURATION: 97 % | RESPIRATION RATE: 16 BRPM

## 2022-09-27 VITALS
DIASTOLIC BLOOD PRESSURE: 71 MMHG | OXYGEN SATURATION: 96 % | RESPIRATION RATE: 16 BRPM | WEIGHT: 199.96 LBS | TEMPERATURE: 98 F | HEART RATE: 81 BPM | HEIGHT: 67 IN | SYSTOLIC BLOOD PRESSURE: 126 MMHG

## 2022-09-27 LAB
ALBUMIN SERPL ELPH-MCNC: 3.2 G/DL — LOW (ref 3.4–5)
ALP SERPL-CCNC: 79 U/L — SIGNIFICANT CHANGE UP (ref 40–120)
ALT FLD-CCNC: 48 U/L — HIGH (ref 12–42)
AMPHET UR-MCNC: NEGATIVE — SIGNIFICANT CHANGE UP
ANION GAP SERPL CALC-SCNC: 12 MMOL/L — SIGNIFICANT CHANGE UP (ref 9–16)
AST SERPL-CCNC: 31 U/L — SIGNIFICANT CHANGE UP (ref 15–37)
BARBITURATES UR SCN-MCNC: NEGATIVE — SIGNIFICANT CHANGE UP
BASOPHILS # BLD AUTO: 0.02 K/UL — SIGNIFICANT CHANGE UP (ref 0–0.2)
BASOPHILS NFR BLD AUTO: 0.2 % — SIGNIFICANT CHANGE UP (ref 0–2)
BENZODIAZ UR-MCNC: NEGATIVE — SIGNIFICANT CHANGE UP
BILIRUB SERPL-MCNC: 0.4 MG/DL — SIGNIFICANT CHANGE UP (ref 0.2–1.2)
BUN SERPL-MCNC: 18 MG/DL — SIGNIFICANT CHANGE UP (ref 7–23)
CALCIUM SERPL-MCNC: 8.9 MG/DL — SIGNIFICANT CHANGE UP (ref 8.5–10.5)
CHLORIDE SERPL-SCNC: 100 MMOL/L — SIGNIFICANT CHANGE UP (ref 96–108)
CO2 SERPL-SCNC: 24 MMOL/L — SIGNIFICANT CHANGE UP (ref 22–31)
COCAINE METAB.OTHER UR-MCNC: POSITIVE
CREAT SERPL-MCNC: 1.13 MG/DL — SIGNIFICANT CHANGE UP (ref 0.5–1.3)
EGFR: 73 ML/MIN/1.73M2 — SIGNIFICANT CHANGE UP
EOSINOPHIL # BLD AUTO: 0.02 K/UL — SIGNIFICANT CHANGE UP (ref 0–0.5)
EOSINOPHIL NFR BLD AUTO: 0.2 % — SIGNIFICANT CHANGE UP (ref 0–6)
ETHANOL SERPL-MCNC: 102 MG/DL — HIGH
GLUCOSE SERPL-MCNC: 120 MG/DL — HIGH (ref 70–99)
HCT VFR BLD CALC: 35.5 % — LOW (ref 39–50)
HGB BLD-MCNC: 12 G/DL — LOW (ref 13–17)
IMM GRANULOCYTES NFR BLD AUTO: 0.2 % — SIGNIFICANT CHANGE UP (ref 0–0.9)
LYMPHOCYTES # BLD AUTO: 2.35 K/UL — SIGNIFICANT CHANGE UP (ref 1–3.3)
LYMPHOCYTES # BLD AUTO: 25.5 % — SIGNIFICANT CHANGE UP (ref 13–44)
MCHC RBC-ENTMCNC: 32.3 PG — SIGNIFICANT CHANGE UP (ref 27–34)
MCHC RBC-ENTMCNC: 33.8 GM/DL — SIGNIFICANT CHANGE UP (ref 32–36)
MCV RBC AUTO: 95.7 FL — SIGNIFICANT CHANGE UP (ref 80–100)
METHADONE UR-MCNC: NEGATIVE — SIGNIFICANT CHANGE UP
MONOCYTES # BLD AUTO: 0.7 K/UL — SIGNIFICANT CHANGE UP (ref 0–0.9)
MONOCYTES NFR BLD AUTO: 7.6 % — SIGNIFICANT CHANGE UP (ref 2–14)
NEUTROPHILS # BLD AUTO: 6.09 K/UL — SIGNIFICANT CHANGE UP (ref 1.8–7.4)
NEUTROPHILS NFR BLD AUTO: 66.3 % — SIGNIFICANT CHANGE UP (ref 43–77)
NRBC # BLD: 0 /100 WBCS — SIGNIFICANT CHANGE UP (ref 0–0)
OPIATES UR-MCNC: NEGATIVE — SIGNIFICANT CHANGE UP
PCP SPEC-MCNC: SIGNIFICANT CHANGE UP
PCP UR-MCNC: NEGATIVE — SIGNIFICANT CHANGE UP
PLATELET # BLD AUTO: 270 K/UL — SIGNIFICANT CHANGE UP (ref 150–400)
POTASSIUM SERPL-MCNC: 3.2 MMOL/L — LOW (ref 3.5–5.3)
POTASSIUM SERPL-SCNC: 3.2 MMOL/L — LOW (ref 3.5–5.3)
PROT SERPL-MCNC: 7.4 G/DL — SIGNIFICANT CHANGE UP (ref 6.4–8.2)
RBC # BLD: 3.71 M/UL — LOW (ref 4.2–5.8)
RBC # FLD: 13.2 % — SIGNIFICANT CHANGE UP (ref 10.3–14.5)
SODIUM SERPL-SCNC: 136 MMOL/L — SIGNIFICANT CHANGE UP (ref 132–145)
THC UR QL: POSITIVE
TSH SERPL-MCNC: 1.17 UIU/ML — SIGNIFICANT CHANGE UP (ref 0.36–3.74)
WBC # BLD: 9.2 K/UL — SIGNIFICANT CHANGE UP (ref 3.8–10.5)
WBC # FLD AUTO: 9.2 K/UL — SIGNIFICANT CHANGE UP (ref 3.8–10.5)

## 2022-09-27 PROCEDURE — 93010 ELECTROCARDIOGRAM REPORT: CPT

## 2022-09-27 PROCEDURE — 90792 PSYCH DIAG EVAL W/MED SRVCS: CPT | Mod: GC,95

## 2022-09-27 PROCEDURE — 99284 EMERGENCY DEPT VISIT MOD MDM: CPT

## 2022-09-27 RX ORDER — TRAZODONE HCL 50 MG
1 TABLET ORAL
Qty: 30 | Refills: 0
Start: 2022-09-27 | End: 2022-10-26

## 2022-09-27 RX ORDER — QUETIAPINE FUMARATE 200 MG/1
1 TABLET, FILM COATED ORAL
Qty: 30 | Refills: 0
Start: 2022-09-27 | End: 2022-10-26

## 2022-09-27 RX ORDER — POTASSIUM CHLORIDE 20 MEQ
40 PACKET (EA) ORAL ONCE
Refills: 0 | Status: COMPLETED | OUTPATIENT
Start: 2022-09-27 | End: 2022-09-27

## 2022-09-27 RX ORDER — QUETIAPINE FUMARATE 200 MG/1
100 TABLET, FILM COATED ORAL ONCE
Refills: 0 | Status: COMPLETED | OUTPATIENT
Start: 2022-09-27 | End: 2022-09-27

## 2022-09-27 RX ADMIN — Medication 40 MILLIEQUIVALENT(S): at 04:57

## 2022-09-27 RX ADMIN — QUETIAPINE FUMARATE 100 MILLIGRAM(S): 200 TABLET, FILM COATED ORAL at 06:50

## 2022-09-27 NOTE — ED PROVIDER NOTE - OBJECTIVE STATEMENT
62-year-old male with history of multiple prior visits for alcohol abuse, alcohol withdrawal, history of substance abuse, history of multiple admissions for psychiatric suicidal ideation, schizoaffective disorder, hx HTN, hypothyroid,  presents with acute suicidal ideation.  Patient notes he is hearing voices that are telling him to jump in front of a moving train.  Patient notes he is concerned that he will hurt himself and jump in front of a train at any time. Patient endorses cocaine use a few days ago but otherwise does endorse alcohol use this evening.  Denies chest pain shortness of breath, abdominal pain, nausea vomiting diarrhea, denies headache or neck pain.  Denies falls.  Patient is a smoker, and denies other drugs of abuse.  Currently denies visual hallucinations.  Denies seizures.  Patient has been noncompliant with his Seroquel 300 mg and trazodone 100 mg for the past week.  [facility] and has not been there for medication refills or for follow-up.  Patient is currently undomiciled.

## 2022-09-27 NOTE — ED BEHAVIORAL HEALTH ASSESSMENT NOTE - NSBHATTESTBILLING_PSY_A_CORE
[Urgent Visit] : Urgent Visit [FreeTextEntry1] : concerned re blocked duct, breast mildly tender. Initially supplementing with formula for brief period. Now continues exclusively breastfeeding. [id number] diagnostic evaluation with medical services

## 2022-09-27 NOTE — ED BEHAVIORAL HEALTH ASSESSMENT NOTE - NSSUICRSKFACTOR_PSY_ALL_CORE
Current and Past Psychiatric Diagnoses/Presenting Symptoms/Activating Events/Stressors Current and Past Psychiatric Diagnoses/Presenting Symptoms/Treatment Related Factors/Activating Events/Stressors

## 2022-09-27 NOTE — ED BEHAVIORAL HEALTH ASSESSMENT NOTE - REASON
pending reassessment after offering PO meds ED team to consult SW for possible detox/rehab placement

## 2022-09-27 NOTE — ED ADULT NURSE REASSESSMENT NOTE - NS ED NURSE REASSESS COMMENT FT1
Transfer of care received from RN. Pt pending detox admission. Will continue to monitor. Transfer of care received from RN. Pt pending detox admission.   Cleared by psych and no longer on 1:1. Remains A&Ox4, spon resps on Ra unlabored, NAD. Will continue to monitor.

## 2022-09-27 NOTE — ED BEHAVIORAL HEALTH ASSESSMENT NOTE - PATIENT'S CHIEF COMPLAINT
"hearing voices telling me to kill myself" "I'm hearing voices telling me to kill myself. suicidal. depressed."

## 2022-09-27 NOTE — SBIRT NOTE ADULT - NSSBIRTALCACTIVEREFTXDET_GEN_A_CORE
Provided SBIRT services: Full screen positive. Referral to Treatment Performed. Screening results were reviewed with the patient and patient was provided information about healthy guidelines and potential negative consequences associated with level of risk. Motivation and readiness to reduce or stop use was discussed and goals and activities to make changes were suggested/offered. Active referral to inpatient substance abuse treatment has been completed to [facility].

## 2022-09-27 NOTE — ED BEHAVIORAL HEALTH ASSESSMENT NOTE - NSBHATTESTCOMMENTATTENDFT_PSY_A_CORE
Patient is a 62-year-old male; street homeless; ; hx of polysubstance use (alcohol, cocaine, cannabis); self-reported PPHx of schizophrenia, personality disorder, depression, prior admissions, prior SA by OD; PMHx of hep C, hypothyroidism; BIBS; psychiatry consulted for SI/CAH.  Pt known to writer from nearly identical presentation last week.  While he reports SI/CAH, he does not appear to be acutely depressed, psychotic, manic, anxious, agitated, or intoxicated.  He has conditional SI if discharged and is amenable to transfer to inpatient detox/rehab for further treatment.  Pt with multiple prior similar presentations with concern for substance-induced symptomatology and malingering.  He has recurrent similar presentations without demonstration of intent to adhere with outpatient referrals and follow up.  As per previous evaluations, there is no reasonable improvement expected to be gained by hospitalization at this time.  Pt remains at chronically elevated risk of harm to self/others given risk factors outline above.  Risk will be mitigated by transfer directly to detox/rehab program.  Pt also has pattern of help seeking behavior and frequents various EDs across the city.

## 2022-09-27 NOTE — ED ADULT NURSE REASSESSMENT NOTE - DESCRIPTION
telepsych spoke with patient and patient is denying any suicidal or homicidal ideations; malingering tendencies as per telepsych from previous visits; patient was sleeping in bed earlier at RN assessment; patient hungry and thirsty and given food by PCT Jose ; MD Magana aware now form telepsych patient will not be admitted and plan is for detox; SBIRT will be notified by MD; will discontinue 1:1 sitter by PCT as per order by MD Magana

## 2022-09-27 NOTE — ED BEHAVIORAL HEALTH ASSESSMENT NOTE - SUMMARY
62-year-old male; street homeless; ; hx of polysubstance use (alcohol, cocaine, cannabis); self-reported PPHx of schizophrenia, personality disorder, depression, prior admissions, prior SA by OD; PMHx of hep C, hypothyroidism; BIBS; psychiatry consulted for SI/CAH.  Pt currently reporting SI/CAH.     Pt with multiple prior similar presentations with concern for substance-induced symptomatology and malingering.      Impression:  - substance-induced psychosis/mood disorder (cocaine, EtOH, THC)   - concern for malingering   - hx of possible schizophrenia     Recommendations:   - patient reports he is amenable for substance use rehab at this time   - may offer seroquel 100 mg 62-year-old AA male, street homeless, , never , no children, with hx of polysubstance use (alcohol, cocaine, cannabis); self-reported PPHx of "schizophrenia," "personality disorder," "depression," prior admissions, prior SA by OD; PMHx of hep C, hypothyroidism; BIBS. Per chart review, patient has hx of malingering - per chart review, [facility] (see chart note from Ros on 9/23/22).  Presentation today was extremely similar to presentation from prior week (9/23/22). Most recent UDS was + for cocaine and marijuana. No PRNs in ED. Telepsychiatry consulted for SI/CAH.      Pt with multiple prior similar presentations with concern for substance-induced symptomatology and concern for malingering. Patient report of events is extremely similar to last psychiatric evaluation in ED, and patient's account of events is inconsistent with objective findings, chart, and timeline (e.g. UDS +MJ and +cocaine but denies any recent substance use other than EtOH, claimed he was hearing voices x7d 1 week ago, but now still endorsing hearing voices x7d today; claims he ran out of meds 3 days ago, but endorsed he ran out of meds x7d last week, etc.).     Impression:  - substance-induced psychosis/mood disorder (cocaine, THC, EtOH)   - concern for malingering   - hx of possible schizophrenia     Recommendations:   - patient reports he is amenable for substance use rehab at this time; recommend referral   - may offer seroquel 100 mg  - on discharge, provide safety plan, and [facility]  - patient advised to return to ED or call 911 for any worsening symptoms  - treat and release 62-year-old AA male, street homeless, , never , no children, with hx of polysubstance use (alcohol, cocaine, cannabis); self-reported PPHx of "schizophrenia," "personality disorder," "depression," prescribed seroquel 400 mg qhs and trazodone 100 mg qhs, prior IPP admissions, prior SA by OD; PMHx of hep C, hypothyroidism; BIBS. Per chart, patient has hx of malingering - [facility] for [facility]alingering (see note from [facility] on 9/23/22).  Presentation today was extremely similar to presentation from prior week (9/23/22). Most recent UDS was + for cocaine and marijuana. No PRNs in ED. Telepsychiatry consulted for SI/CAH.      Pt with multiple prior similar presentations with concern for substance-induced symptomatology and concern for malingering. Patient report of events is extremely similar to last psychiatric evaluation in ED, and patient's account of events is inconsistent with objective findings, chart, and timeline (e.g. UDS +MJ and +cocaine but denies any recent substance use other than EtOH, claimed he was hearing voices x7d 1 week ago, but now still endorsing hearing voices x7d today; claims he ran out of meds 3 days ago, but endorsed he ran out of meds x7d last week, etc.).     Impression:  - substance-induced psychosis/mood disorder (cocaine, THC, EtOH)   - concern for malingering   - hx of possible schizophrenia     Recommendations:   - patient reports he is amenable for substance use rehab at this time; recommend referral   - may offer seroquel 100 mg  - on discharge, provide safety plan, and [facility]  - patient advised to return to ED or call 911 for any worsening symptoms  - treat and release

## 2022-09-27 NOTE — ED BEHAVIORAL HEALTH ASSESSMENT NOTE - DETAILS
to kill self n/a d/w ED attending hx [facility] per [facility] alcohol use disorder per chart pt reports OD ~2 months ago on #5 pills, reports going to train tracks today pt reports OD ~2 months ago on #5 pills

## 2022-09-27 NOTE — ED PROVIDER NOTE - PATIENT PORTAL LINK FT
You can access the FollowMyHealth Patient Portal offered by [facility] by registering at the following website: [email or web address]. By joining [employer]’s FollowMyHealth portal, you will also be able to view your health information using other applications (apps) compatible with our system.

## 2022-09-27 NOTE — ED BEHAVIORAL HEALTH ASSESSMENT NOTE - HPI (INCLUDE ILLNESS QUALITY, SEVERITY, DURATION, TIMING, CONTEXT, MODIFYING FACTORS, ASSOCIATED SIGNS AND SYMPTOMS)
****THIS IS AN INCOMPLETE NOTE. ****PLAN HAS NOT BEEN FINALIZED BY ATTENDING. ****FULL NOTE TO FOLLOW SHORTLY. ****     Patient is a 62-year-old male; street homeless; ; hx of polysubstance use (alcohol, cocaine, cannabis); self-reported PPHx of schizophrenia, personality disorder, depression, prior admissions, prior SA by OD; PMHx of hep C, hypothyroidism; BIBS; psychiatry consulted for SI/CAH.  Pt states he is hearing voices telling him to kill himself.  He also reported that he is "depressed, suicidal, going to jump in front of a train."  He reports having these thoughts for the past week and states he came in today because he was getting ready to jump in front of the train and reports he was on the tracks but stopped because he is "scared to die."  Pt reports insomnia, reports feeling "tired and weak," denies anhedonia, normal appetite.  He describes the CAH as a male voice that "sounds like the devil."  He also reports paranoia.  Pt initially denies being at other hospitals but when asked about multiple wristbands, he states that he wasn't admitted because there were no beds.  He states he wants to be admitted so he doesn't jump in front of the train.  He reports daily alcohol use, marijuana use 1x/week, crack use (initially states last 1 week ago then states a few days ago when confronted with positive utox).  He reports hx of withdrawal vomiting/shakes but denies hx of withdrawal seizures.        Covid Screen - Patient  testing? denies positive test in past 3 months  vaccine? received 1 dose  exposures? denies 62-year-old AA male, street homeless, , never , no children, with hx of polysubstance use (alcohol, cocaine, cannabis); self-reported PPHx of "schizophrenia," "personality disorder," "depression," prior admissions, prior SA by OD; PMHx of hep C, hypothyroidism; BIBS. Per chart review, patient has hx of malingering - per chart review, [facility] for [facility]ering (see chart note from [facility] on 9/23/22).  Presentation today was extremely similar to presentation from prior week (9/23/22). Most recent UDS was + for cocaine and marijuana. No PRNs in ED. Telepsychiatry consulted for SI/CAH.      Patient was calm and cooperative on interview; however, patient gave very brief, vague answers to interview questions. Patient reports, "I'm hearing voices telling me to kill myself. suicidal. depressed." Patient endorses that the voices are telling him to "jump in front of a train," and he last heard these voices today in the AM.  He reports having these thoughts for the past week and states he came in today because of the voices. States that he is "scared to die."  Patient claims that he hasn't been taking his medications for the past 3 days because he "ran out" of his medications, although chart review from last weeks indicates he claimed that he ran out of his medications prior to then. Pt reports insomnia, reports feeling "tired and weak," denies anhedonia, normal appetite.  He describes the CAH as a male voice that he has reportedly heard before. He also reports paranoia.  [facility] end[facility] because they reportedly "don't have beds." Note that on last assessment, he initially reported he hadn't gone to other hospitals until confronted about wearing a wristband associated with another hospital. Today, patient reports daily alcohol use, but denies any recent marijuana or crack use. However, UDS was +marijuana and +cocaine. Per prior chart review, he previously reported reports daily alcohol use, marijuana use 1x/week, crack use (initially states last 1 week ago then states a few days ago only after confronted with positive utox at that time.     Covid Screen - Patient  testing? denies positive test in past 3 months  vaccine? received 1 dose  exposures? denies 62-year-old AA male, street homeless, , never , no children, with hx of polysubstance use (alcohol, cocaine, cannabis); self-reported PPHx of "schizophrenia," "personality disorder," "depression," prior admissions, prior SA by OD; PMHx of hep C, hypothyroidism; BIBS. Per chart, patient has hx of malingering - per chart review, [facility] for [facility]alingering (see chart note from [facility] on 9/23/22).  Presentation today was extremely similar to presentation from prior week (9/23/22). Most recent UDS was + for cocaine and marijuana. No PRNs in ED. Telepsychiatry consulted for SI/CAH.      Patient was calm and cooperative on interview; however, patient gave very brief, vague answers to interview questions. Patient reports, "I'm hearing voices telling me to kill myself. suicidal. depressed." Patient endorses that the voices are telling him to "jump in front of a train," and he last heard these voices today in the AM.  He reports having these thoughts for the past week and states he came in today because of the voices. States that he is "scared to die."  Patient claims that he hasn't been taking his medications for the past 3 days because he "ran out" of his medications, although chart review from last weeks indicates he claimed that he ran out of his medications prior to then. Pt reports insomnia, reports feeling "tired and weak," denies anhedonia, normal appetite.  He describes the CAH as a male voice that he has reportedly heard before. He also reports paranoia.  Patient endorses [facility] but [facility] because they reportedly "don't have beds." Note that on last assessment, he initially reported he hadn't gone to other hospitals until confronted about wearing a wristband associated with another hospital. Today, patient reports daily alcohol use, but denies any recent marijuana or crack use. However, UDS was +marijuana and +cocaine. Per prior chart review, he previously reported reports daily alcohol use, marijuana use 1x/week, crack use (initially states last 1 week ago then states a few days ago only after confronted with positive utox at that time.     Covid Screen - Patient  testing? denies positive test in past 3 months  vaccine? received 1 dose  exposures? denies 62-year-old AA male, street homeless, , never , no children, with hx of polysubstance use (alcohol, cocaine, cannabis); self-reported PPHx of "schizophrenia," "personality disorder," "depression," prescribed seroquel 400 mg qhs and trazodone 100 mg qhs, prior IPP admissions, prior SA by OD; PMHx of hep C, hypothyroidism; BIBS. Per chart, patient has hx of malingering - [facility] for [facility]alingering (see note from Ros on 9/23/22).  Presentation today was extremely similar to presentation from prior week (9/23/22). Most recent UDS was + for cocaine and marijuana. No PRNs in ED. Telepsychiatry consulted for SI/CAH.      Patient was calm and cooperative on interview; however, patient gave very brief, vague answers to interview questions. Patient reports, "I'm hearing voices telling me to kill myself. suicidal. depressed." Patient endorses that the voices are telling him to "jump in front of a train," and he last heard these voices today in the AM.  He reports having these thoughts for the past week and states he came in today because of the voices. States that he is "scared to die."  Patient claims that he hasn't been taking his medications for the past 3 days because he "ran out" of his medications, although chart review from last weeks indicates he claimed that he ran out of his medications prior to then. Pt reports insomnia, reports feeling "tired and weak," denies anhedonia, normal appetite.  He describes the CAH as a male voice that he has reportedly heard before. He also reports paranoia.  Patient endorses [facility] but [facility] because they reportedly "don't have beds." Note that on last assessment, he initially reported he hadn't gone to other hospitals until confronted about wearing a wristband associated with another hospital. Today, patient reports daily alcohol use, but denies any recent marijuana or crack use. However, UDS was +marijuana and +cocaine. Per prior chart review, he previously reported reports daily alcohol use, marijuana use 1x/week, crack use (initially states last 1 week ago then states a few days ago only after confronted with positive utox at that time.     Covid Screen - Patient  testing? denies positive test in past 3 months  vaccine? received 1 dose  exposures? denies

## 2022-09-27 NOTE — ED BEHAVIORAL HEALTH NOTE - BEHAVIORAL HEALTH NOTE
==================  PRE-HOSPITAL COURSE  ===================  SOURCE:  RN and secondhand ED documentation  DETAILS: 61 yo M BIB self for CAH and SI to jump in front of a train.   =========  ED COURSE  =========  SOURCE:  RN Mary and secondhand ED documentation.  ARRIVAL:  Per chart and RN, patient arrived via walk in. Per RN, patient was calm upon arrival, and cooperative with triage process.  BELONGINGS:  Per RN, patient currently in a gown with a 1:1 staff member.  BEHAVIOR: RN described patient to be calm, cooperative, providing different information to different providers (i.e distressing CAH to one provider and no concerns or issues to another), presenting with linear thought process, AAOx3, presenting with normal mood and appropriate affect, remains in good behavioral and impulse control, is not violent/aggressive. RN stated that the patient is endorsing SI and AH, denies HI/VH. RN stated that there no visible marks, bruises, or lacerations on the body. RN stated that the patient appears to be disheveled, maintains poor hygiene, and reports poor ADLs, ambulates without assistance.  TREATMENT:  Per chart and RN, patient required PRN medications: PO Seroquel 100mg.   VISITORS:  Per RN, no visitors at bedside.    COVID Exposure Screen- collateral (i.e. third-party, chart review, belongings, etc; include EMS and ED staff)   ---------------------------------------------------  1. Has the patient had a COVID-19 test in the last 90 days? Unknown.   2. Has the patient tested positive for COVID-19 antibodies? Unknown.   3. Has the patient received 2 doses of the COVID-19 vaccine? Unknown  4. In the past 10 days, has the patient been around anyone with a positive COVID-19 test?* Unknown.   5. Has the patient been out of [place] within the past 10 days? Unknown

## 2022-09-27 NOTE — ED BEHAVIORAL HEALTH ASSESSMENT NOTE - RISK ASSESSMENT
Moderate Acute Suicide Risk risk factors: male, substance use, +SI, +CAH, undomiciled, limited social support, not compliant with meds     protective factors: help seeking, denies access to guns risk factors: male, substance use, +SI, +CAH, undomiciled, limited social support, not compliant with meds     protective factors: help seeking, denies access to guns, "scared to die," denies anhedonia Low Acute Suicide Risk

## 2022-09-27 NOTE — ED PROVIDER NOTE - CLINICAL SUMMARY MEDICAL DECISION MAKING FREE TEXT BOX
Patient with history of schizoaffective disorder, psychiatric past admissions, history of depression with suicidal ideations in the past, history of alcohol abuse and substance abuse presents with active suicidal ideation noting he wants to jump in front of a train and is hearing voices to tell him to do so.  Patient also notes a history of hypertension and hypothyroidism for which she takes no medications at the current time.  Patient is otherwise hemodynamically stable with stable vital signs speaking coherently and tolerated p.o. in the room with no issues.  Will work-up for psychiatric and medical clearance and psychiatric evaluation.  Give dose of Seroquel this evening as well as he has not taken his medications in a week

## 2022-09-29 DIAGNOSIS — F17.200 NICOTINE DEPENDENCE, UNSPECIFIED, UNCOMPLICATED: ICD-10-CM

## 2022-09-29 DIAGNOSIS — F19.94 OTHER PSYCHOACTIVE SUBSTANCE USE, UNSPECIFIED WITH PSYCHOACTIVE SUBSTANCE-INDUCED MOOD DISORDER: ICD-10-CM

## 2022-09-29 DIAGNOSIS — F32.A DEPRESSION, UNSPECIFIED: ICD-10-CM

## 2022-09-29 DIAGNOSIS — F25.9 SCHIZOAFFECTIVE DISORDER, UNSPECIFIED: ICD-10-CM

## 2022-09-29 DIAGNOSIS — F10.10 ALCOHOL ABUSE, UNCOMPLICATED: ICD-10-CM

## 2022-09-29 DIAGNOSIS — Z59.00 HOMELESSNESS UNSPECIFIED: ICD-10-CM

## 2022-09-29 DIAGNOSIS — Z91.19 PATIENT'S NONCOMPLIANCE WITH OTHER MEDICAL TREATMENT AND REGIMEN: ICD-10-CM

## 2022-09-29 DIAGNOSIS — R45.851 SUICIDAL IDEATIONS: ICD-10-CM

## 2022-09-29 SDOH — ECONOMIC STABILITY - HOUSING INSECURITY: HOMELESSNESS UNSPECIFIED: Z59.00

## 2022-10-03 NOTE — ED ADULT TRIAGE NOTE - HEART RATE (BEATS/MIN)
PA has been initiated on his trulance through [facility].  Trulance has been approved from 10/3/22 - 10/3/23  PA Case: [id number].  Patient notified through my chart.  Pharmacy has been notified.   80

## 2022-10-13 NOTE — ED ADULT NURSE NOTE - CAS ELECT INFOMATION PROVIDED
Occupational Therapy  Facility/Department: [address] NEURO  Occupational Therapy Daily Treatment Note    Name: Flora Riggs  : 1954  MRN: [id number]  Date of Service: 10/13/2022    Discharge Recommendations:  Patient would benefit from continued therapy after discharge in order to increase pt safety and independence       Assessment   Performance deficits / Impairments: Decreased functional mobility ; Decreased ADL status; Decreased safe awareness;Decreased high-level IADLs;Decreased endurance  Prognosis: Good  REQUIRES OT FOLLOW-UP: Yes  Activity Tolerance  Activity Tolerance: Patient Tolerated treatment well        Plan   Occupational Therapy Plan  Times Per Week: 2-3x/wk     Restrictions  Restrictions/Precautions  Restrictions/Precautions: General Precautions, Fall Risk  Required Braces or Orthoses?: No  Position Activity Restriction  Other position/activity restrictions: O2    Subjective   General  Chart Reviewed: Yes  Family / Caregiver Present: No  General Comment  Comments: Pt and RN agreeable to therapy this day. At start of session pt seated in chair and at session end pt retired to seated in chair with call light, RN notified. Pt denied pain throughout session         Objective        Safety Devices  Type of Devices: Call light within reach; Left in chair;Nurse notified  Restraints  Restraints Initially in Place: No  Balance  Sitting: Without support (Pt tolerated approx 5 min unsupported at SUP)  Standing: Without support (Pt tolerated approx 1 min static standing with RW)  Gait  Overall Level of Assistance: Stand-by assistance (chair><toilet utilizing RW with O2)        ADL  Additional Comments: Pt declined all ADLs despite SHIRLEY encouragement     Activity Tolerance  Activity Tolerance: Patient tolerated treatment well  Activity Tolerance Comments: SpO2 between % this date on 6 lpm.  Bed mobility  Bed Mobility Comments: pt seated in chair at start/end of session  Transfers  Sit to stand: Stand by assistance  Stand to sit: Stand by assistance  Transfer Comments: utilizing RW demo G hand placement     Cognition  Overall Cognitive Status: [facility]  Orientation  Overall Orientation Status: Within Functional Limits                  Education Given To: Patient  Education Provided: Role of Therapy;Transfer Training  Education Provided Comments: proper hand and foot placement; safety precautions-F carry over  Education Method: Demonstration;Verbal  Education Outcome: Continued education needed                                                  AM-PAC Score        AM-PAC Inpatient Daily Activity Raw Score: 20 (10/13/22 1623)  AM-PAC Inpatient ADL T-Scale Score : 42.03 (10/13/22 1623)  ADL Inpatient CMS 0-100% Score: 38.32 (10/13/22 1623)  ADL Inpatient CMS G-Code Modifier : Vijay Reyes (10/13/22 1623)      Goals  Short Term Goals  Time Frame for Short Term Goals: By discharge, pt will:  Short Term Goal 1: Demo functional sit<>stand transfers and functional mobility with Mod IND and LRD PRN  Short Term Goal 2: Demo 10 minutes of dynamic standing balance with SBA to promote increased engagement in ADLs  Short Term Goal 3: Demo UB ADLs with Mod IND  Short Term Goal 4: Demo LB ADLs with SUP and DME PRN  Short Term Goal 5: Demo good safety awareness independently throughout all functional activities with 0 VCs each visit       Therapy Time   Individual Concurrent Group Co-treatment   Time In 1551         Time Out 1604         Minutes 13         Timed Code Treatment Minutes: Susana Knutson 42, SHIRLEY/L DC instructions

## 2022-12-06 ENCOUNTER — EMERGENCY (EMERGENCY)
Facility: HOSPITAL | Age: 62
LOS: 1 days | Discharge: HOME | End: 2022-12-06
Attending: EMERGENCY MEDICINE
Payer: COMMERCIAL

## 2022-12-06 VITALS
SYSTOLIC BLOOD PRESSURE: 117 MMHG | OXYGEN SATURATION: 97 % | DIASTOLIC BLOOD PRESSURE: 74 MMHG | HEART RATE: 76 BPM | TEMPERATURE: 98 F | RESPIRATION RATE: 18 BRPM

## 2022-12-06 VITALS
SYSTOLIC BLOOD PRESSURE: 111 MMHG | HEART RATE: 88 BPM | TEMPERATURE: 98 F | DIASTOLIC BLOOD PRESSURE: 74 MMHG | RESPIRATION RATE: 18 BRPM | WEIGHT: 160.06 LBS | HEIGHT: 68 IN | OXYGEN SATURATION: 95 %

## 2022-12-06 PROCEDURE — 72125 CT NECK SPINE W/O DYE: CPT | Mod: 26,MA

## 2022-12-06 PROCEDURE — 70450 CT HEAD/BRAIN W/O DYE: CPT | Mod: 26,MA

## 2022-12-06 PROCEDURE — 99285 EMERGENCY DEPT VISIT HI MDM: CPT

## 2022-12-06 PROCEDURE — 99285 EMERGENCY DEPT VISIT HI MDM: CPT | Mod: 25

## 2022-12-06 PROCEDURE — 72125 CT NECK SPINE W/O DYE: CPT | Mod: MA

## 2022-12-06 PROCEDURE — 99053 MED SERV 10PM-8AM 24 HR FAC: CPT

## 2022-12-06 PROCEDURE — 70450 CT HEAD/BRAIN W/O DYE: CPT | Mod: MA

## 2022-12-06 PROCEDURE — 82962 GLUCOSE BLOOD TEST: CPT

## 2022-12-06 NOTE — ED PROVIDER NOTE - PHYSICAL EXAMINATION
No distress  GCS 15, no raccoon eyes, no Battles sign, no scalp step off deformities.   No cervical, thoracic or lumbosacral midline bony deformities,  +rotation and flexion-extension of neck and truncal area intact.

## 2022-12-06 NOTE — ED PROVIDER NOTE - PATIENT PORTAL LINK FT
You can access the FollowMyHealth Patient Portal offered by [facility] by registering at the following website: [email or web address]. By joining [employer]’s FollowMyHealth portal, you will also be able to view your health information using other applications (apps) compatible with our system.

## 2022-12-06 NOTE — ED PROVIDER NOTE - ENMT, MLM
10:30 Airway patent, Nasal mucosa clear. Mouth with normal mucosa. Throat has no vesicles, no oropharyngeal exudates and uvula is midline.

## 2022-12-06 NOTE — ED PROVIDER NOTE - NSFOLLOWUPINSTRUCTIONS_ED_ALL_ED_FT
Do not drink alcohol in excess.  Return to ER if you have pain, fever, vomiting, feel depressed, anxious or unsafe. See contact information for detox facility. If you need any assistance for appointments please contact our Care Coordinator at [phone].

## 2022-12-06 NOTE — ED PROVIDER NOTE - NSFOLLOWUPCLINICS_GEN_ALL_ED_FT
[facility]  Internal Medicine  [address] [place] [address]  Phone: [phone]  Fax: [phone]    [facility]  Detox  [address] [place] [address]  Phone: [phone]  Fax: [phone]

## 2022-12-06 NOTE — ED PROVIDER NOTE - OBJECTIVE STATEMENT
Patient was brought in by EMS for public intoxication and possible fall.  On evaluation patient is initially sleeping but responsive to verbal stimuli.  Patient able to state his full name and date of birth.  Patient has no complaints and admits to drinking vodka prior to ED arrival.  No signs of trauma on evaluation.  Meds Synthroid  Patient denies being on anticoagulants.  No suicidal ideation.

## 2022-12-06 NOTE — ED ADULT NURSE NOTE - ED STAT RN HANDOFF DETAILS 2
patient discharged home, reports being accepted at shelter across from [facility], patient verbalized understanding how to get to shelter. ambulated independently. denies pain/discomfort. belongings returned to patient.

## 2022-12-06 NOTE — ED PROVIDER NOTE - CLINICAL SUMMARY MEDICAL DECISION MAKING FREE TEXT BOX
No TBI or c-spine fx reported on CT.   will monitor for clinical sobriety. No TBI or c-spine fx reported on CT.   will monitor for clinical sobriety.  730a- Pt is clinically sober, walking in ED.  Denies suicidal ideation, refused detox, denies being un-domiciled, refused  consult.   Pt is well appearing, has no new complaints and able to walk with normal gait. Pt is stable for discharge and follow up with medical doctor. Pt educated on care and need for follow up. Discussed anticipatory guidance and return precautions. Questions answered. I had a detailed discussion with the patient and/or guardian regarding the historical points, exam findings, and any diagnostic results supporting the discharge diagnosis.

## 2022-12-06 NOTE — ED ADULT TRIAGE NOTE - SOURCE OF INFORMATION
Patient Education     Acute Otitis Media with Infection (Child)    Your child has a middle ear infection (acute otitis media). It is caused by bacteria or fungi. The middle ear is the space behind the eardrum. The eustachian tube connects the ear to the nasal passage. The eustachian tubes help drain fluid from the ears. They also keep the air pressure equal inside and outside the ears. These tubes are shorter and more horizontal in children. This makes it more likely for the tubes to become blocked. A blockage lets fluid and pressure build up in the middle ear. Bacteria or fungi can grow in this fluid and cause an ear infection. This infection is commonly known as an earache.  The main symptom of an ear infection is ear pain. Other symptoms may include pulling at the ear, being more fussy than usual, decreased appetie, vomiting or diarrhea.Your child’s hearing may also be affected. Your child may have had a respiratory infection first.  An ear infection may clear up on its own. Or your child may need to take medicine. After the infection goes away, your child may still have fluid in the middle ear. It may take weeks or months for this fluid to go away. During that time, your child may have temporary hearing loss. But all other symptoms of the earache should be gone.  Home care  Follow these guidelines when caring for your child at home:  · The health care provider will likely prescribe medicines for pain. The provider may also prescribe antibiotics or antifungals to treat the infection. These may be liquid medicines to give by mouth. Or they may be ear drops. Follow the provider’s instructions for giving these medicines to your child.  · Because ear infections can clear up on their own, the provider may suggest waiting for a few days before giving your child medicines for infection.  · To reduce pain, have your child rest in an upright position. Hot or cold compresses held against the ear may help ease pain.  · Keep  the ear dry. Have your child wear a shower cap when bathing.  To help prevent future infections:  · Avoid smoking near your child. Secondhand smoke raises the risk for ear infections in children.  · Make sure your child gets all appropriate vaccinations.  · Do not bottle feed while your baby is lying on his or her back. (This position can cause  middle ear infections because it allows milk to run into the eustacian tubes.)      · If you breastfeed ccontinue until your child is 6-12 months of age.  To apply ear drops:  1. Put the bottle in warm water if the medicine is kept in the refrigerator. Cold drops in the ear are uncomfortable.  2. Have your child lie down on a flat surface. Gently hold your child’s head to one side.  3. Remove any drainage from the ear with a clean tissue or cotton swab. Clean only the outer ear. Don’t put the cotton swab into the ear canal.  4. Straighten the ear canal by gently pulling the earlobe up and back.  5. Keep the dropper a half-inch above the ear canal. This will keep the dropper from becoming contaminated. Put the drops against the side of the ear canal.  6. Have your child stay lying down for 2 to 3 minutes. This gives time for the medicine to enter the ear canal. If your child doesn’t have pain, gently massage the outer ear near the opening.  7. Wipe any extra medicine away from the outer ear with a clean cotton ball.  Follow-up care  Follow up with your child’s healthcare provider as directed. Your child will need to have the ear rechecked to make sure the infection has resolved. Check with your doctor to see when they want to see your child.  Special note to parents  If your child continues to get earaches, he or she may need ear tubes. The provider will put small tubes in your child’s eardrum to help keep fluid from building up. This procedure is a simple and works well.  When to seek medical advice  Unless advised otherwise, call your child's healthcare provider if:  · Your  child is 3 months old or younger and has a fever of 100.4°F (38°C) or higher. Your child may need to see a healthcare provider.  · Your child is of any age and has fevers higher than 104°F (40°C) that come back again and again.  Call your child's healthcare provider for any of the following:  · New symptoms, especially swelling around the ear or weakness of face muscles  · Severe pain  · Infection seems to get worse, not better   · Neck pain  · Your child acts very sick or not themself  · Fever or pain do not improve with antibiotics after 48 hours  © 0073-0934 [employer]. [address] [place] [address], [place] [address]. All rights reserved. This information is not intended as a substitute for professional medical care. Always follow your healthcare professional's instructions.            EMS

## 2023-01-19 ENCOUNTER — EMERGENCY (EMERGENCY)
Facility: HOSPITAL | Age: 63
LOS: 1 days | Discharge: HOME | End: 2023-01-19
Attending: EMERGENCY MEDICINE | Admitting: EMERGENCY MEDICINE
Payer: MEDICARE

## 2023-01-19 VITALS
SYSTOLIC BLOOD PRESSURE: 176 MMHG | HEART RATE: 89 BPM | WEIGHT: 199.96 LBS | RESPIRATION RATE: 18 BRPM | OXYGEN SATURATION: 95 % | DIASTOLIC BLOOD PRESSURE: 119 MMHG | TEMPERATURE: 97 F

## 2023-01-19 DIAGNOSIS — E03.9 HYPOTHYROIDISM, UNSPECIFIED: ICD-10-CM

## 2023-01-19 DIAGNOSIS — Z86.16 PERSONAL HISTORY OF COVID-19: ICD-10-CM

## 2023-01-19 DIAGNOSIS — R41.82 ALTERED MENTAL STATUS, UNSPECIFIED: ICD-10-CM

## 2023-01-19 DIAGNOSIS — F25.9 SCHIZOAFFECTIVE DISORDER, UNSPECIFIED: ICD-10-CM

## 2023-01-19 DIAGNOSIS — I10 ESSENTIAL (PRIMARY) HYPERTENSION: ICD-10-CM

## 2023-01-19 DIAGNOSIS — F19.90 OTHER PSYCHOACTIVE SUBSTANCE USE, UNSPECIFIED, UNCOMPLICATED: ICD-10-CM

## 2023-01-19 DIAGNOSIS — F10.139 ALCOHOL ABUSE WITH WITHDRAWAL, UNSPECIFIED: ICD-10-CM

## 2023-01-19 DIAGNOSIS — F10.129 ALCOHOL ABUSE WITH INTOXICATION, UNSPECIFIED: ICD-10-CM

## 2023-01-19 PROCEDURE — 99284 EMERGENCY DEPT VISIT MOD MDM: CPT

## 2023-01-19 PROCEDURE — 70450 CT HEAD/BRAIN W/O DYE: CPT | Mod: 26

## 2023-01-19 RX ORDER — ONDANSETRON 8 MG/1
8 TABLET, FILM COATED ORAL ONCE
Refills: 0 | Status: COMPLETED | OUTPATIENT
Start: 2023-01-19 | End: 2023-01-19

## 2023-01-19 RX ADMIN — ONDANSETRON 8 MILLIGRAM(S): 8 TABLET, FILM COATED ORAL at 23:28

## 2023-01-19 RX ADMIN — ONDANSETRON 8 MILLIGRAM(S): 8 TABLET, FILM COATED ORAL at 21:38
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
## 2023-01-19 NOTE — ED ADULT NURSE REASSESSMENT NOTE - NS ED NURSE REASSESS COMMENT FT1
Pt care handed over until midnight , checked on patient as nurse in charge kiet gave teofilofran as pt had vomited, pt awake laying on back, advised to lay on side if still nauseous, pt stated "no im good I feel better on my back" P3earl , obeying commands, awaiting dc

## 2023-01-19 NOTE — ED PROVIDER NOTE - PATIENT PORTAL LINK FT
You can access the FollowMyHealth Patient Portal offered by [facility] by registering at the following website: [email or web address]. By joining [employer]’s FollowMyHealth portal, you will also be able to view your health information using other applications (apps) compatible with our system.

## 2023-01-19 NOTE — ED ADULT NURSE REASSESSMENT NOTE - NS ED NURSE REASSESS COMMENT FT1
Devonte Lazaro , vital signs as charted, pt states drinks "vodka" throughout the day , dr Rodriguez aware

## 2023-01-19 NOTE — ED ADULT NURSE NOTE - OBJECTIVE STATEMENT
Patient presents to ED for ams and fall. Patient states that he fell and hit the back of his head. - LOC, - ac therapy. Endorses drinking a pint of vodka. Patient reports that he drinks alcohol once a week. Denies cp, sob, cough. Pt alert, spont unlabored breathing on ra, MAEx4.
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
## 2023-01-19 NOTE — ED PROVIDER NOTE - CLINICAL SUMMARY MEDICAL DECISION MAKING FREE TEXT BOX
Ricardo - Patient is a 62-year-old male alcohol abuse, alcohol withdrawal, history of substance abuse, history of multiple admissions for psychiatric suicidal ideation, schizoaffective disorder, hx HTN, hypothyroid presenting with altered mental status, after being found down on the street. will check CT head to assess for trauma. will reassess when clinically sober

## 2023-01-19 NOTE — ED PROVIDER NOTE - NS ED ATTENDING STATEMENT MOD
I have seen and examined this patient and fully participated in the care of this patient as the teaching attending.  The service was shared with the STEFANIA.  I reviewed and verified the documentation and independently performed the documented:

## 2023-01-19 NOTE — ED PROVIDER NOTE - NSFOLLOWUPINSTRUCTIONS_ED_ALL_ED_FT
Alcohol Intoxication    WHAT YOU NEED TO KNOW:    Alcohol intoxication is a harmful physical condition caused when you drink more alcohol than your body can handle. It is also called ethanol poisoning, or being drunk.    DISCHARGE INSTRUCTIONS:    Call your local emergency number (911 in the ) if:   •You have sudden trouble breathing or chest pain.      •You have a seizure.      •You feel sad enough to harm yourself or others.      Call your doctor if:   •You have hallucinations (you see or hear things that are not real).      •You cannot stop vomiting.      •You have questions or concerns about your condition or care.      Recommended alcohol limits:   •Men 21 to 64 years should limit alcohol to 2 drinks a day. Do not have more than 4 drinks in 1 day or more than 14 in 1 week.      •All women, and men 65 or older should limit alcohol to 1 drink in a day. Do not have more than 3 drinks in 1 day or more than 7 in 1 week. No amount of alcohol is okay during pregnancy.      Manage alcohol use:   •Decrease the amount you drink. This can help prevent health problems such as brain, heart, and liver damage, high blood pressure, diabetes, and cancer. If you cannot stop completely, healthcare providers can help you set goals to decrease the amount you drink.      •Plan weekly alcohol use. You will be less likely to drink more than the recommended limit if you plan ahead.      •Have food when you drink alcohol. Food will prevent alcohol from getting into your system too quickly. Eat before you have your first alcohol drink.      •Time your drinks carefully. Have no more than 1 drink in an hour. Have a liquid such as water, coffee, or a soft drink between alcohol drinks.      •Do not drive if you have had alcohol. Make sure someone who has not been drinking can help you get home.      •Do not drink alcohol if you are taking medicine. Alcohol is dangerous when you combine it with certain medicines, such as acetaminophen or blood pressure medicine. Talk to your healthcare provider about all the medicines you currently take.      For more information:   •Alcoholics Anonymous  Web Address: http://www.aa.org      •Substance Abuse and Mental Health Services Administration  [address]  [place] [address]  Web Address: http://www.[facility].gov        Follow up with your healthcare provider as directed: Write down your questions so you remember to ask them during your visits.

## 2023-01-19 NOTE — ED PROVIDER NOTE - PROGRESS NOTE DETAILS
Pt now A&Ox3, ambulatory w steady gait.  Asking for medication for constipation.  Will give dose of lactulose, dc.

## 2023-01-19 NOTE — ED ADULT NURSE NOTE - PAIN RATING/NUMBER SCALE (0-10): ACTIVITY
March 31, 2020     Patient: Daniela Prakash   YOB: 1992                                                             BREAST PUMP ORDER     Patient Name: Daniela Prakash         YOB: 1992  [place] [address]  [phone]      Today's date: 03/31/20    Ordering Physician:  Glenda Cao MD  [facility] OBSTETRICS/GYNECOLOGY  [address] [place] [address]   Phone: [phone]     Patient Insurance:   BLUE CROSS BLUE SHIELD [facility] [id number]  [id number]     [id number]    [facility] Referral number (if applicable):  N/A     [facility]  Phone [phone]  Fax  [phone]    Order: [employer] Electric Breast Pump  Estimated Date of Delivery: 6/7/2020     Diagnosis: Lactating Mother  Z39.1    Special Directions: Please call patient when order is received. [phone]     Electronically Signed by: Glenda Cao MD                                                   
4

## 2023-01-19 NOTE — ED PROVIDER NOTE - PHYSICAL EXAMINATION
Tatiana Silva MD  GENERAL: Patient awake alert NAD.  HEENT: NC/AT, Moist mucous membranes, EOMI.  LUNGS: CTAB, no wheezes or crackles.   CARDIAC: RRR, no m/r/g.    ABDOMEN: Soft, NT, ND, No rebound, guarding. No CVA tenderness.   EXT: No edema. No calf tenderness.   MSK: No spinal tenderness, no pain with movement, no deformities.  NEURO: A&Ox3. Moving all extremities. slurred  SKIN: Warm and dry. No rash.  PSYCH: Normal affect. 30

## 2023-01-20 VITALS
HEART RATE: 87 BPM | OXYGEN SATURATION: 98 % | RESPIRATION RATE: 16 BRPM | TEMPERATURE: 97 F | DIASTOLIC BLOOD PRESSURE: 84 MMHG | SYSTOLIC BLOOD PRESSURE: 128 MMHG

## 2023-01-20 LAB
ALBUMIN SERPL ELPH-MCNC: 4.3 G/DL — SIGNIFICANT CHANGE UP (ref 3.4–5)
ALP SERPL-CCNC: 116 U/L — SIGNIFICANT CHANGE UP (ref 40–120)
ALT FLD-CCNC: 49 U/L — HIGH (ref 12–42)
ANION GAP SERPL CALC-SCNC: 12 MMOL/L — SIGNIFICANT CHANGE UP (ref 9–16)
AST SERPL-CCNC: 35 U/L — SIGNIFICANT CHANGE UP (ref 15–37)
BILIRUB SERPL-MCNC: 0.6 MG/DL — SIGNIFICANT CHANGE UP (ref 0.2–1.2)
BUN SERPL-MCNC: 12 MG/DL — SIGNIFICANT CHANGE UP (ref 7–23)
CALCIUM SERPL-MCNC: 9.4 MG/DL — SIGNIFICANT CHANGE UP (ref 8.5–10.5)
CHLORIDE SERPL-SCNC: 103 MMOL/L — SIGNIFICANT CHANGE UP (ref 96–108)
CO2 SERPL-SCNC: 30 MMOL/L — SIGNIFICANT CHANGE UP (ref 22–31)
CREAT SERPL-MCNC: 0.82 MG/DL — SIGNIFICANT CHANGE UP (ref 0.5–1.3)
EGFR: 99 ML/MIN/1.73M2 — SIGNIFICANT CHANGE UP
ETHANOL SERPL-MCNC: 56 MG/DL — HIGH
GLUCOSE SERPL-MCNC: 109 MG/DL — HIGH (ref 70–99)
HCT VFR BLD CALC: 39.4 % — SIGNIFICANT CHANGE UP (ref 39–50)
HGB BLD-MCNC: 13.6 G/DL — SIGNIFICANT CHANGE UP (ref 13–17)
LIDOCAIN IGE QN: 79 U/L — SIGNIFICANT CHANGE UP (ref 73–393)
MCHC RBC-ENTMCNC: 31.7 PG — SIGNIFICANT CHANGE UP (ref 27–34)
MCHC RBC-ENTMCNC: 34.5 GM/DL — SIGNIFICANT CHANGE UP (ref 32–36)
MCV RBC AUTO: 91.8 FL — SIGNIFICANT CHANGE UP (ref 80–100)
NRBC # BLD: 0 /100 WBCS — SIGNIFICANT CHANGE UP (ref 0–0)
PLATELET # BLD AUTO: 288 K/UL — SIGNIFICANT CHANGE UP (ref 150–400)
POTASSIUM SERPL-MCNC: 3.7 MMOL/L — SIGNIFICANT CHANGE UP (ref 3.5–5.3)
POTASSIUM SERPL-SCNC: 3.7 MMOL/L — SIGNIFICANT CHANGE UP (ref 3.5–5.3)
PROT SERPL-MCNC: 9.2 G/DL — HIGH (ref 6.4–8.2)
RBC # BLD: 4.29 M/UL — SIGNIFICANT CHANGE UP (ref 4.2–5.8)
RBC # FLD: 13.2 % — SIGNIFICANT CHANGE UP (ref 10.3–14.5)
SODIUM SERPL-SCNC: 145 MMOL/L — SIGNIFICANT CHANGE UP (ref 132–145)
WBC # BLD: 9.84 K/UL — SIGNIFICANT CHANGE UP (ref 3.8–10.5)
WBC # FLD AUTO: 9.84 K/UL — SIGNIFICANT CHANGE UP (ref 3.8–10.5)

## 2023-01-20 RX ORDER — SODIUM CHLORIDE 9 MG/ML
1000 INJECTION INTRAMUSCULAR; INTRAVENOUS; SUBCUTANEOUS ONCE
Refills: 0 | Status: COMPLETED | OUTPATIENT
Start: 2023-01-20 | End: 2023-01-20

## 2023-01-20 RX ORDER — LACTULOSE 10 G/15ML
10 SOLUTION ORAL ONCE
Refills: 0 | Status: COMPLETED | OUTPATIENT
Start: 2023-01-20 | End: 2023-01-20

## 2023-01-20 RX ADMIN — LACTULOSE 10 GRAM(S): 10 SOLUTION ORAL at 05:55

## 2023-01-20 RX ADMIN — SODIUM CHLORIDE 1000 MILLILITER(S): 9 INJECTION INTRAMUSCULAR; INTRAVENOUS; SUBCUTANEOUS at 00:34

## 2023-01-20 NOTE — ED ADULT NURSE REASSESSMENT NOTE - NS ED NURSE REASSESS COMMENT FT1
pt has iv inserted by [employer] with thanks, bloods sent to lab, on advice of Dr Rodriguez , pt gcs 15

## 2023-03-06 ENCOUNTER — EMERGENCY (EMERGENCY)
Facility: HOSPITAL | Age: 63
LOS: 1 days | Discharge: HOME | End: 2023-03-06
Admitting: EMERGENCY MEDICINE
Payer: MEDICARE

## 2023-03-06 VITALS
DIASTOLIC BLOOD PRESSURE: 68 MMHG | TEMPERATURE: 98 F | OXYGEN SATURATION: 98 % | SYSTOLIC BLOOD PRESSURE: 109 MMHG | HEART RATE: 96 BPM | RESPIRATION RATE: 18 BRPM

## 2023-03-06 PROCEDURE — 70450 CT HEAD/BRAIN W/O DYE: CPT | Mod: 26

## 2023-03-06 PROCEDURE — 99285 EMERGENCY DEPT VISIT HI MDM: CPT

## 2023-03-06 NOTE — ED PROVIDER NOTE - NSFOLLOWUPINSTRUCTIONS_ED_ALL_ED_FT
Alcohol Use Disorder    WHAT YOU NEED TO KNOW:    Alcohol use disorder (AUD) is problem drinking. AUD includes alcohol abuse and alcohol dependency.     DISCHARGE INSTRUCTIONS:    Seek care immediately if:     Your heart is beating faster than usual.      You have hallucinations.      You cannot remember what happens while you are drinking.      You have seizures.    Contact your healthcare provider if:     You are anxious and have nausea.      Your hands are shaky and you are sweating heavily.      You have questions or concerns about your condition or care.    Follow up with your healthcare provider as directed: Do not try to stop drinking on your own. Your healthcare provider may need to help you withdraw from alcohol safely. He may need to admit you to the hospital. You may also need any of the following treatments:    Medicines to decrease your craving for alcohol      Support groups such as Alcoholics Anonymous       Therapy from a psychiatrist or psychologist       Admission to an inpatient facility for treatment for severe AUD    Interested in discussing options to reduce your alcohol or drug use?      [facility]: [phone]   [facility] Substance Abuse Services: [phone], option #2   Methadone Maintenance & Ambulatory Opiate Detox: [phone]  [facility]: [phone]  [facility]: [phone]  [facility]: [phone]    [facility]: [phone], option #2   [facility]: [phone]    [facility]: [phone]    [facility] Central Intake: [phone]  [facility] Chemical Dependency/Ancillary Withdrawal: [phone]  [facility] Methadone Maintenance: [phone]    [facility]: [phone]  [facility] Addiction Treatment Services: [phone]    [facility]Line: 2-837-8-HOPENY    [facility] Office of Alcoholism and Substance Abuse Services (OASAS): https://www.oasas.[place].gov/providerdirectory/  [facility] for Addiction Services and Psychotherapy Interventions Research (CASPIR)  www.[facility].org     Interested in discussing options to reduce your tobacco use?    [facility] for Tobacco Control:  [phone]  [facility] QUITLINE: 3-133-RP-QUITS ([phone])    Interested in learning more about substance use?      http://rethinkingdrinking.niaaa.nih.gov   https://www.drugabuse.gov/patients-families     Learn more about opioid overdose prevention programs in [facility]:  http://www.[facility].ny.[facility]/diseases/aids/general/opioid_overdose_prevention/     Head Injury    WHAT YOU NEED TO KNOW:    A head injury is most often caused by a blow to the head. This may occur from a fall, bicycle injury, sports injury, being struck in the head, or a motor vehicle accident.     DISCHARGE INSTRUCTIONS:    Call 911 or have someone else call for any of the following:     You cannot be woken.      You have a seizure.      You stop responding to others or you faint.      You have blurry or double vision.      Your speech becomes slurred or confused.      You have arm or leg weakness, loss of feeling, or new problems with coordination.      Your pupils are larger than usual or one pupil is a different size than the other.       You have blood or clear fluid coming out of your ears or nose.    Return to the emergency department if:     You have repeated or forceful vomiting.      You feel confused.      Your headache gets worse or becomes severe.      You or someone caring for you notices that you are harder to wake than usual.    Contact your healthcare provider if:     Your symptoms last longer than 6 weeks after the injury.      You have questions or concerns about your condition or care.    Medicines:     Acetaminophen decreases pain. Acetaminophen is available without a doctor's order. Ask how much to take and how often to take it. Follow directions. Acetaminophen can cause liver damage if not taken correctly.      Take your medicine as directed. Contact your healthcare provider if you think your medicine is not helping or if you have side effects. Tell him or her if you are allergic to any medicine. Keep a list of the medicines, vitamins, and herbs you take. Include the amounts, and when and why you take them. Bring the list or the pill bottles to follow-up visits. Carry your medicine list with you in case of an emergency.    Self-care:     Rest or do quiet activities for 24 to 48 hours. Limit your time watching TV, using the computer, or doing tasks that require a lot of thinking. Slowly return to your normal activities as directed. Do not play sports or do activities that may cause you to get hit in the head. Ask your healthcare provider when you can return to sports.       Apply ice on your head for 15 to 20 minutes every hour or as directed. Use an ice pack, or put crushed ice in a plastic bag. Cover it with a towel before you apply it to your skin. Ice helps prevent tissue damage and decreases swelling and pain.       Have someone stay with you for 24 hours or as directed. This person can monitor you for complications and call 911. When you are awake the person should ask you a few questions to see if you are thinking clearly. An example would be to ask your name or your address.     Prevent another head injury:     Wear a helmet that fits properly. Do this when you play sports, or ride a bike, scooter, or skateboard. Helmets help decrease your risk of a serious head injury. Talk to your healthcare provider about other ways you can protect yourself if you play sports.      Wear your seat belt every time you are in a car. This helps to decrease your risk for a head injury if you are in a car accident.     Follow up with your healthcare provider as directed: Write down your questions so you remember to ask them during your visits.

## 2023-03-06 NOTE — ED ADULT NURSE NOTE - OBJECTIVE STATEMENT
Pt reports he is a daily drinker, 1 pint of liquor per day. Last drink right before arrival. Pt with hx of withdrawals but no reported seizure., Denies drug use, no medical complaints. Pt placed on bed alarm.

## 2023-03-06 NOTE — ED PROVIDER NOTE - UNABLE TO OBTAIN
Ignacio Campuzano)  Gynecologic Oncology; Obstetrics and Gynecology  [address] [place] [address]  Phone: [phone]  Fax: [phone]  Follow Up Time: 2 weeks   Unable to fully cooperate with remainder of history due to clinical condition/AMS. Severe Illness/Injury

## 2023-03-06 NOTE — ED PROVIDER NOTE - PATIENT PORTAL LINK FT
You can access the FollowMyHealth Patient Portal offered by [facility] by registering at the following website: [email or web address]. By joining [employer]’s FollowMyHealth portal, you will also be able to view your health information using other applications (apps) compatible with our system.

## 2023-03-06 NOTE — ED PROVIDER NOTE - CLINICAL SUMMARY MEDICAL DECISION MAKING FREE TEXT BOX
Patient presenting with acute ETOH intoxication/AMS. Patient admits to drinking EtOH, and admits to fall and head strike. History and ROS limited due to altered state. No evidence of head or extremity trauma on exam. Will get a CT head. Will observe for clinical sobriety.

## 2023-03-06 NOTE — ED PROVIDER NOTE - PHYSICAL EXAMINATION
CONST: Appears comfortable in the bed. Poor hygiene.   ENT: Airway patent, protecting airway. Nasal mucosa clear. No signs of trauma to head, face or neck. Poor dentition.   EYES: Sclera clear. Pupils round and symmetrical bilaterally.  CARD: S1, S2 normal; no murmurs, gallops, or rubs. Regular rate and rhythm.  RESP: Breath sounds clear and equal bilaterally.  GI: Abdomen soft, non-tender, non-distended  MSK: No signs of acute trauma or injury to all extremities.  No tenderness to midline cervical spine to palpation.  NEURO: Speech is slurred but appropriate. Answers simple questions appropriately. Moves all extremities.   SKIN: Skin is normal temperature; no diaphoresis; no pallor. No signs of acute trauma or injury.   PSYCH: Clinically intoxicated and cooperative.

## 2023-03-06 NOTE — ED PROVIDER NOTE - CARE PLAN
1 Principal Discharge DX:	Alcohol intoxication, uncomplicated  Secondary Diagnosis:	Closed head injury

## 2023-03-06 NOTE — ED PROVIDER NOTE - PROGRESS NOTE DETAILS
pt awake, feeling improved. eating peanut butter sandwich. speaking clearly. oriented to being in the hospital in [facility]. will d/c.

## 2023-03-06 NOTE — ED ADULT NURSE REASSESSMENT NOTE - NS ED NURSE REASSESS COMMENT FT1
Pt ambulated out of department without papers or vitals reassessment. D/c'd by provider and ambulated independently.

## 2023-03-06 NOTE — ED PROVIDER NOTE - OBJECTIVE STATEMENT
63 y/o M, admits to homelessness, [facility] for ETOH intoxication. Admits to heavy ETOH use today. Patient admits to fall with head strike. Placed in stretcher and quickly falls asleep. Patient denies any other injuries at this time. Unable to cooperate with remainder of history due to clinical condition/AMS.
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
## 2023-03-08 DIAGNOSIS — I10 ESSENTIAL (PRIMARY) HYPERTENSION: ICD-10-CM

## 2023-03-08 DIAGNOSIS — F20.9 SCHIZOPHRENIA, UNSPECIFIED: ICD-10-CM

## 2023-03-08 DIAGNOSIS — F10.920 ALCOHOL USE, UNSPECIFIED WITH INTOXICATION, UNCOMPLICATED: ICD-10-CM

## 2023-03-08 DIAGNOSIS — S09.90XA UNSPECIFIED INJURY OF HEAD, INITIAL ENCOUNTER: ICD-10-CM

## 2023-03-08 DIAGNOSIS — E03.9 HYPOTHYROIDISM, UNSPECIFIED: ICD-10-CM

## 2023-03-08 DIAGNOSIS — Z86.19 PERSONAL HISTORY OF OTHER INFECTIOUS AND PARASITIC DISEASES: ICD-10-CM

## 2023-03-08 DIAGNOSIS — Z59.00 HOMELESSNESS UNSPECIFIED: ICD-10-CM
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
## 2023-03-08 SDOH — ECONOMIC STABILITY - HOUSING INSECURITY: HOMELESSNESS UNSPECIFIED: Z59.00

## 2023-03-10 NOTE — ED PROVIDER NOTE - CHIEF COMPLAINT
Plastic Surgeon Procedure Text (A): After obtaining clear surgical margins the patient was sent to plastics for surgical repair.  The patient understands they will receive post-surgical care and follow-up from the referring physician's office. The patient is a 61y Male complaining of suicidal thoughts.

## 2023-04-15 ENCOUNTER — EMERGENCY (EMERGENCY)
Facility: HOSPITAL | Age: 63
LOS: 1 days | Discharge: HOME | End: 2023-04-15
Attending: EMERGENCY MEDICINE | Admitting: EMERGENCY MEDICINE
Payer: MEDICARE

## 2023-04-15 VITALS
SYSTOLIC BLOOD PRESSURE: 155 MMHG | TEMPERATURE: 98 F | OXYGEN SATURATION: 100 % | HEART RATE: 84 BPM | DIASTOLIC BLOOD PRESSURE: 73 MMHG | RESPIRATION RATE: 16 BRPM

## 2023-04-15 PROCEDURE — 99284 EMERGENCY DEPT VISIT MOD MDM: CPT

## 2023-04-15 NOTE — ED ADULT TRIAGE NOTE - CHIEF COMPLAINT QUOTE
Pt BIBA c/o nausea/vomiting after alcohol use. pt aox4, speech clear. Pt denies pain or injuries. No obvious injuries noted.

## 2023-04-15 NOTE — ED ADULT TRIAGE NOTE - HEART RATE (BEATS/MIN)
[facility]  Infectious Disease Progress Note          Assessment and Plan:    ANTONINO Jade is a 70 year old female who was admitted on 1/11/2018.      Impression:  1.  A 70-year-old female well known to us from prior admissions, admitted with mental status changes, UTI being suspected but patient afebrile, normal wbc.   2.  History of Recurrent urinary tract infections, chronic Johnson catheter in place, has had vancomycin-resistant enterococcus and extended spectrum beta lactamase in the past.   3.  Diabetes mellitus. Her blood glucose level per EMS was 28.   4.  Prior renal transplant, on chronic immunosuppressant therapy without major immunosuppressing infections, renal function slightly abnormal, but stable.   5.  Sulfa allergy and listed quinolone allergy, quinolones were challenged in past successfully. Currently on Meropenem.  6. History of C diff.           RECOMMENDATIONS:   1. Procalcitonin  negative. No fever, no leucocytosis.   2. Multiple antibiotics for possible UTI` s in past might have been a cause of MDR and C diff so trying to minimize  3. On figueroa, ? Ceftriaxone if all we are concerned about is Uc            Interval History:   Intubated, no fever UC pansens e coli              Medications:       LORazepam  1 mg Intravenous Once     LORazepam  1 mg Intravenous Once     chlorhexidine  15 mL Mouth/Throat Q12H     famotidine  20 mg Intravenous Q12H     insulin aspart  1-6 Units Subcutaneous Q4H     aspirin  81 mg Oral Daily     cloNIDine  0.1 mg Oral BID     labetalol  150 mg Oral BID     levothyroxine  50 mcg Oral Daily     pravastatin  10 mg Oral Daily     predniSONE  5 mg Oral Daily     tacrolimus  2 mg Oral BID     cholecalciferol  4,000 Units Oral BID     sodium chloride (PF)  3 mL Intracatheter Q8H     enoxaparin  40 mg Subcutaneous Q24H     meropenem  500 mg Intravenous Q6H                  Physical Exam:   Blood pressure 121/59, pulse 76, temperature 98.8  F (37.1  C),  "temperature source Oral, resp. rate 16, height 1.676 m (5' 6\"), weight 108 kg (238 lb), SpO2 100 %, not currently breastfeeding.  Wt Readings from Last 2 Encounters:   01/11/18 108 kg (238 lb)   12/23/17 108 kg (238 lb)     Vital Signs with Ranges  Temp:  [97.9  F (36.6  C)-98.8  F (37.1  C)] 98.8  F (37.1  C)  Pulse:  [76] 76  Heart Rate:  [57-83] 81  Resp:  [16] 16  BP: ()/(25-97) 121/59  FiO2 (%):  [40 %-80 %] 40 %  SpO2:  [98 %-100 %] 100 %    Constitutional: Intubated, ICU   Lungs: Clear to auscultation bilaterally, no crackles or wheezing   Cardiovascular: Regular rate and rhythm, normal S1 and S2, and no murmur noted   Abdomen: Normal bowel sounds, soft, non-distended, non-tender   Skin: No rashes, no cyanosis, no edema   Other:           Data:   All microbiology laboratory data reviewed.  Recent Labs   Lab Test  01/13/18   0535  01/12/18   0648  01/11/18   1502   WBC  5.4  5.4  6.1   HGB  11.6*  11.7  11.9   HCT  35.0  35.2  35.5   MCV  91  91  91   PLT  119*  142*  166     Recent Labs   Lab Test  01/13/18   0535  01/12/18   0648  01/11/18   1502   CR  0.95  1.06*  1.20*     Recent Labs   Lab Test  05/25/16   2116   SED  53*     Recent Labs   Lab Test  01/11/18   1614  01/11/18   1530  12/19/17   2233  12/19/17   1900  12/19/17   1620  12/19/17   1619  12/19/17   1602  11/13/17   1714  11/13/17   1646   CULT  No growth after 2 days  >100,000 colonies/mL  Escherichia coli  *  No growth after 2 days  No MRSA isolated  No growth after 24 days  No growth  No growth  No growth  No growth  >100,000 colonies/mL  Candida albicans / dubliniensis  Candida albicans and Candida dubliniensis are not routinely speciated  Susceptibility testing not routinely done  *       " 84

## 2023-04-16 VITALS
RESPIRATION RATE: 18 BRPM | DIASTOLIC BLOOD PRESSURE: 75 MMHG | SYSTOLIC BLOOD PRESSURE: 131 MMHG | OXYGEN SATURATION: 100 % | HEART RATE: 87 BPM

## 2023-04-16 LAB
ALBUMIN SERPL ELPH-MCNC: 4 G/DL — SIGNIFICANT CHANGE UP (ref 3.4–5)
ALP SERPL-CCNC: 111 U/L — SIGNIFICANT CHANGE UP (ref 40–120)
ALT FLD-CCNC: 42 U/L — SIGNIFICANT CHANGE UP (ref 12–42)
ANION GAP SERPL CALC-SCNC: 11 MMOL/L — SIGNIFICANT CHANGE UP (ref 9–16)
AST SERPL-CCNC: 44 U/L — HIGH (ref 15–37)
BASOPHILS # BLD AUTO: 0.02 K/UL — SIGNIFICANT CHANGE UP (ref 0–0.2)
BASOPHILS NFR BLD AUTO: 0.2 % — SIGNIFICANT CHANGE UP (ref 0–2)
BILIRUB SERPL-MCNC: 0.6 MG/DL — SIGNIFICANT CHANGE UP (ref 0.2–1.2)
BUN SERPL-MCNC: 13 MG/DL — SIGNIFICANT CHANGE UP (ref 7–23)
CALCIUM SERPL-MCNC: 9.5 MG/DL — SIGNIFICANT CHANGE UP (ref 8.5–10.5)
CHLORIDE SERPL-SCNC: 101 MMOL/L — SIGNIFICANT CHANGE UP (ref 96–108)
CO2 SERPL-SCNC: 29 MMOL/L — SIGNIFICANT CHANGE UP (ref 22–31)
CREAT SERPL-MCNC: 0.92 MG/DL — SIGNIFICANT CHANGE UP (ref 0.5–1.3)
EGFR: 93 ML/MIN/1.73M2 — SIGNIFICANT CHANGE UP
EOSINOPHIL # BLD AUTO: 0.03 K/UL — SIGNIFICANT CHANGE UP (ref 0–0.5)
EOSINOPHIL NFR BLD AUTO: 0.3 % — SIGNIFICANT CHANGE UP (ref 0–6)
GLUCOSE SERPL-MCNC: 104 MG/DL — HIGH (ref 70–99)
HCT VFR BLD CALC: 38.5 % — LOW (ref 39–50)
HGB BLD-MCNC: 12.7 G/DL — LOW (ref 13–17)
HIV 1 & 2 AB SERPL IA.RAPID: SIGNIFICANT CHANGE UP
IMM GRANULOCYTES NFR BLD AUTO: 0.2 % — SIGNIFICANT CHANGE UP (ref 0–0.9)
LYMPHOCYTES # BLD AUTO: 1.23 K/UL — SIGNIFICANT CHANGE UP (ref 1–3.3)
LYMPHOCYTES # BLD AUTO: 12.4 % — LOW (ref 13–44)
MCHC RBC-ENTMCNC: 31.2 PG — SIGNIFICANT CHANGE UP (ref 27–34)
MCHC RBC-ENTMCNC: 33 GM/DL — SIGNIFICANT CHANGE UP (ref 32–36)
MCV RBC AUTO: 94.6 FL — SIGNIFICANT CHANGE UP (ref 80–100)
MONOCYTES # BLD AUTO: 0.5 K/UL — SIGNIFICANT CHANGE UP (ref 0–0.9)
MONOCYTES NFR BLD AUTO: 5 % — SIGNIFICANT CHANGE UP (ref 2–14)
NEUTROPHILS # BLD AUTO: 8.13 K/UL — HIGH (ref 1.8–7.4)
NEUTROPHILS NFR BLD AUTO: 81.9 % — HIGH (ref 43–77)
NRBC # BLD: 0 /100 WBCS — SIGNIFICANT CHANGE UP (ref 0–0)
PLATELET # BLD AUTO: 287 K/UL — SIGNIFICANT CHANGE UP (ref 150–400)
POTASSIUM SERPL-MCNC: 4.2 MMOL/L — SIGNIFICANT CHANGE UP (ref 3.5–5.3)
POTASSIUM SERPL-SCNC: 4.2 MMOL/L — SIGNIFICANT CHANGE UP (ref 3.5–5.3)
PROT SERPL-MCNC: 8.9 G/DL — HIGH (ref 6.4–8.2)
RBC # BLD: 4.07 M/UL — LOW (ref 4.2–5.8)
RBC # FLD: 13.7 % — SIGNIFICANT CHANGE UP (ref 10.3–14.5)
SODIUM SERPL-SCNC: 141 MMOL/L — SIGNIFICANT CHANGE UP (ref 132–145)
WBC # BLD: 9.93 K/UL — SIGNIFICANT CHANGE UP (ref 3.8–10.5)
WBC # FLD AUTO: 9.93 K/UL — SIGNIFICANT CHANGE UP (ref 3.8–10.5)

## 2023-04-16 RX ORDER — FAMOTIDINE 10 MG/ML
20 INJECTION INTRAVENOUS ONCE
Refills: 0 | Status: COMPLETED | OUTPATIENT
Start: 2023-04-16 | End: 2023-04-16

## 2023-04-16 RX ORDER — ONDANSETRON 8 MG/1
4 TABLET, FILM COATED ORAL ONCE
Refills: 0 | Status: COMPLETED | OUTPATIENT
Start: 2023-04-16 | End: 2023-04-16

## 2023-04-16 RX ORDER — SODIUM CHLORIDE 9 MG/ML
1000 INJECTION INTRAMUSCULAR; INTRAVENOUS; SUBCUTANEOUS ONCE
Refills: 0 | Status: COMPLETED | OUTPATIENT
Start: 2023-04-16 | End: 2023-04-16

## 2023-04-16 RX ADMIN — ONDANSETRON 4 MILLIGRAM(S): 8 TABLET, FILM COATED ORAL at 02:56

## 2023-04-16 RX ADMIN — FAMOTIDINE 20 MILLIGRAM(S): 10 INJECTION INTRAVENOUS at 02:56

## 2023-04-16 RX ADMIN — SODIUM CHLORIDE 1000 MILLILITER(S): 9 INJECTION INTRAMUSCULAR; INTRAVENOUS; SUBCUTANEOUS at 02:56

## 2023-04-16 NOTE — ED ADULT NURSE REASSESSMENT NOTE - NS ED NURSE REASSESS COMMENT FT1
pt needed ultrasound guided iv done by MD Mercado    Patient is placed on stretcher with stretcher alarm on side rails up.

## 2023-04-16 NOTE — ED ADULT NURSE NOTE - OBJECTIVE STATEMENT
Patient a0x3 ambulatory came to ED after nausea and vomiting after drinking alcohol patient denies any other complaints. Patient had an episode of vomiting sheets were changed patient was given anti nausea medications and fluids

## 2023-04-16 NOTE — ED PROVIDER NOTE - OBJECTIVE STATEMENT
62-year-old male/female with unknown past medical history brought in by EMS for alcohol intoxication.  EMS reports patient endorsed alcohol use.  Patient states that he has been drinking today and now he is vomiting.  States that when he drinks alcohol he will sometimes vomit.  Denies abdominal pain, fever/chills, chest pain, shortness of breath.

## 2023-04-16 NOTE — ED PROVIDER NOTE - PHYSICAL EXAMINATION
On exam, patient is mildly intoxicated, alcohol on breath with response to verbal stimulation.  Pupils equal round and reactive to light.  Nonlabored respirations.  No external signs of trauma to the head or extremities appreciated. Abdomen soft, nontender/nondistended.

## 2023-04-16 NOTE — ED PROVIDER NOTE - PATIENT PORTAL LINK FT
You can access the FollowMyHealth Patient Portal offered by [facility] by registering at the following website: [email or web address]. By joining [employer]’s FollowMyHealth portal, you will also be able to view your health information using other applications (apps) compatible with our system.

## 2023-04-16 NOTE — ED PROVIDER NOTE - NSFOLLOWUPINSTRUCTIONS_ED_ALL_ED_FT
PLEASE FOLLOW-UP WITH YOUR PRIMARY CARE DOCTOR IN 1-2 DAYS FOR FURTHER EVALUATION.      PLEASE TAKE ALL PAPERWORK FROM TODAY'S VISIT TO YOUR PRIMARY DOCTOR.     IF YOU DO NOT HAVE A PRIMARY CARE DOCTOR PLEASE REFER TO THE OFFICE/CLINIC INFORMATION GIVEN BELOW:    If you do not have a doctor, you can call our referral line to find a doctor that matches your insurance; the number is [phone].     You can also follow up with clinics listed below, if you do not have a doctor:  [address] [address]  To make an appointment, call [phone]    [facility]  Address: [address] [place] [address]  Appointment Center: [phone] ([phone])     PLEASE RETURN TO THE ER IMMEDIATELY OR CALL [phone] ANY HIGH FEVER, CHEST PAIN, TROUBLE BREATHING, VOMITING, SEVERE PAIN, OR ANY OTHER CONCERNS.

## 2023-04-18 DIAGNOSIS — R11.2 NAUSEA WITH VOMITING, UNSPECIFIED: ICD-10-CM

## 2023-04-18 DIAGNOSIS — F10.929 ALCOHOL USE, UNSPECIFIED WITH INTOXICATION, UNSPECIFIED: ICD-10-CM

## 2023-04-24 NOTE — ED PROVIDER NOTE - NSCAREINITIATED _GEN_ER
For the HUB to read to pt:       LEFT MESSAGE TO CONFIRM APPT, IF PT CALLS BACK PLEAS PUT CALL THROUGH, THANK YOU  
Sadiq Livingston(Attending)

## 2023-04-25 ENCOUNTER — EMERGENCY (EMERGENCY)
Facility: HOSPITAL | Age: 63
LOS: 1 days | Discharge: HOME | End: 2023-04-25
Attending: EMERGENCY MEDICINE | Admitting: EMERGENCY MEDICINE
Payer: MEDICARE

## 2023-04-25 VITALS
OXYGEN SATURATION: 95 % | TEMPERATURE: 98 F | RESPIRATION RATE: 18 BRPM | HEART RATE: 84 BPM | DIASTOLIC BLOOD PRESSURE: 87 MMHG | SYSTOLIC BLOOD PRESSURE: 124 MMHG

## 2023-04-25 PROCEDURE — 99284 EMERGENCY DEPT VISIT MOD MDM: CPT

## 2023-04-25 NOTE — ED ADULT NURSE NOTE - NSIMPLEMENTINTERV_GEN_ALL_ED
Implemented All Universal Safety Interventions:  [place] to call system. Call bell, personal items and telephone within reach. Instruct patient to call for assistance. Room bathroom lighting operational. Non-slip footwear when patient is off stretcher. Physically safe environment: no spills, clutter or unnecessary equipment. Stretcher in lowest position, wheels locked, appropriate side rails in place.

## 2023-04-25 NOTE — ED ADULT TRIAGE NOTE - CHIEF COMPLAINT QUOTE
pt. picked up from the street admits to smoking crack and drinking alcohol. Pt. in no distress on arrival. No visible injuries

## 2023-04-25 NOTE — ED PROVIDER NOTE - OBJECTIVE STATEMENT
63 yo M pmh of psych  Asking for food  Apparently told RN in triage he smoked crack  Denies other acute complaints

## 2023-04-25 NOTE — ED ADULT NURSE NOTE - NURSING NEURO ORIENTATION
DELETE AFTER REVIEWING: Send the encounter HIGH priority, If patient has less than a 3 day supply. If the patient will run out of medication over the weekend add that information to the additional details line. Send this encounter to the clinical pool.    Caller: Schoen, Connie A    Relationship: Self    Best call back number: [phone]   Requested Prescriptions:   Requested Prescriptions     Pending Prescriptions Disp Refills   • levothyroxine (SYNTHROID, LEVOTHROID) 125 MCG tablet 30 tablet 9     Sig: Take 1 tablet by mouth Daily.        Pharmacy where request should be sent: [facility] [address] [place] [address], [place] - [facility] - [phone] [facility] [phone]      Additional details provided by patient: OUT OF MEDICATION FOR 2 DAYS    Does the patient have less than a 3 day supply:  [x] Yes  [] No    Adarsh Mcdaniel   08/04/22 14:59 EDT            oriented to person, place and time

## 2023-04-25 NOTE — ED PROVIDER NOTE - PATIENT PORTAL LINK FT
You can access the FollowMyHealth Patient Portal offered by Health system by registering at the following website: [email or web address]. By joining [facility]’s FollowMyHealth portal, you will also be able to view your health information using other applications (apps) compatible with our system.

## 2023-04-27 DIAGNOSIS — R41.82 ALTERED MENTAL STATUS, UNSPECIFIED: ICD-10-CM

## 2023-04-27 DIAGNOSIS — Z00.00 ENCOUNTER FOR GENERAL ADULT MEDICAL EXAMINATION WITHOUT ABNORMAL FINDINGS: ICD-10-CM

## 2023-05-22 ENCOUNTER — HOSPITAL ENCOUNTER (INPATIENT)
Dept: HOSPITAL 74 - YASAS | Age: 63
LOS: 1 days | Discharge: ANOTHER HOSPITAL | DRG: 897 | End: 2023-05-23
Attending: SURGERY | Admitting: ALLERGY & IMMUNOLOGY
Payer: COMMERCIAL

## 2023-05-22 VITALS — BODY MASS INDEX: 24.7 KG/M2

## 2023-05-22 DIAGNOSIS — F14.20: ICD-10-CM

## 2023-05-22 DIAGNOSIS — Z59.00: ICD-10-CM

## 2023-05-22 DIAGNOSIS — F12.20: ICD-10-CM

## 2023-05-22 DIAGNOSIS — F19.282: ICD-10-CM

## 2023-05-22 DIAGNOSIS — Z56.0: ICD-10-CM

## 2023-05-22 DIAGNOSIS — F25.9: ICD-10-CM

## 2023-05-22 DIAGNOSIS — F51.05: ICD-10-CM

## 2023-05-22 DIAGNOSIS — F17.210: ICD-10-CM

## 2023-05-22 DIAGNOSIS — F10.230: Primary | ICD-10-CM

## 2023-05-22 DIAGNOSIS — R45.851: ICD-10-CM

## 2023-05-22 DIAGNOSIS — Z86.39: ICD-10-CM

## 2023-05-22 PROCEDURE — HZ2ZZZZ DETOXIFICATION SERVICES FOR SUBSTANCE ABUSE TREATMENT: ICD-10-PCS | Performed by: SURGERY

## 2023-05-22 PROCEDURE — U0005 INFEC AGEN DETEC AMPLI PROBE: HCPCS

## 2023-05-22 PROCEDURE — U0003 INFECTIOUS AGENT DETECTION BY NUCLEIC ACID (DNA OR RNA); SEVERE ACUTE RESPIRATORY SYNDROME CORONAVIRUS 2 (SARS-COV-2) (CORONAVIRUS DISEASE [COVID-19]), AMPLIFIED PROBE TECHNIQUE, MAKING USE OF HIGH THROUGHPUT TECHNOLOGIES AS DESCRIBED BY CMS-2020-01-R: HCPCS

## 2023-05-22 RX ADMIN — Medication SCH TAB: at 13:03

## 2023-05-22 RX ADMIN — NICOTINE SCH: 14 PATCH, EXTENDED RELEASE TRANSDERMAL at 13:04

## 2023-05-22 SDOH — ECONOMIC STABILITY - HOUSING INSECURITY: HOMELESSNESS UNSPECIFIED: Z59.00

## 2023-05-22 SDOH — ECONOMIC STABILITY - INCOME SECURITY: UNEMPLOYMENT, UNSPECIFIED: Z56.0

## 2023-05-23 VITALS
DIASTOLIC BLOOD PRESSURE: 71 MMHG | RESPIRATION RATE: 18 BRPM | TEMPERATURE: 98.4 F | HEART RATE: 80 BPM | SYSTOLIC BLOOD PRESSURE: 113 MMHG

## 2023-05-23 LAB
ALBUMIN SERPL-MCNC: 2.6 G/DL (ref 3.4–5)
ALP SERPL-CCNC: 69 U/L (ref 45–117)
ALT SERPL-CCNC: 18 U/L (ref 13–61)
ANION GAP SERPL CALC-SCNC: 6 MMOL/L (ref 8–16)
AST SERPL-CCNC: 14 U/L (ref 15–37)
BILIRUB SERPL-MCNC: 0.7 MG/DL (ref 0.2–1)
BUN SERPL-MCNC: 13.3 MG/DL (ref 7–18)
CALCIUM SERPL-MCNC: 8.6 MG/DL (ref 8.5–10.1)
CHLORIDE SERPL-SCNC: 113 MMOL/L (ref 98–107)
CO2 SERPL-SCNC: 26 MMOL/L (ref 21–32)
CREAT SERPL-MCNC: 0.7 MG/DL (ref 0.55–1.3)
DEPRECATED RDW RBC AUTO: 14.5 % (ref 11.9–15.9)
GLUCOSE SERPL-MCNC: 98 MG/DL (ref 74–106)
HCT VFR BLD CALC: 34.6 % (ref 35.4–49)
HGB BLD-MCNC: 11.8 GM/DL (ref 11.7–16.9)
MCH RBC QN AUTO: 31.1 PG (ref 25.7–33.7)
MCHC RBC AUTO-ENTMCNC: 34 G/DL (ref 32–35.9)
MCV RBC: 91.4 FL (ref 80–96)
PLATELET # BLD AUTO: 316 10^3/UL (ref 134–434)
PMV BLD: 7.8 FL (ref 7.5–11.1)
POTASSIUM SERPLBLD-SCNC: 4.1 MMOL/L (ref 3.5–5.1)
PROT SERPL-MCNC: 5.8 G/DL (ref 6.4–8.2)
RBC # BLD AUTO: 3.79 M/MM3 (ref 4–5.6)
SODIUM SERPL-SCNC: 145 MMOL/L (ref 136–145)
WBC # BLD AUTO: 8.7 K/MM3 (ref 4–10)

## 2023-05-23 RX ADMIN — NICOTINE SCH: 14 PATCH, EXTENDED RELEASE TRANSDERMAL at 10:49

## 2023-05-23 RX ADMIN — Medication SCH: at 10:49

## 2023-05-27 ENCOUNTER — EMERGENCY (EMERGENCY)
Facility: HOSPITAL | Age: 63
LOS: 1 days | Discharge: HOME | End: 2023-05-27
Attending: EMERGENCY MEDICINE | Admitting: EMERGENCY MEDICINE
Payer: MEDICARE

## 2023-05-27 VITALS
HEART RATE: 84 BPM | OXYGEN SATURATION: 100 % | SYSTOLIC BLOOD PRESSURE: 132 MMHG | DIASTOLIC BLOOD PRESSURE: 76 MMHG | TEMPERATURE: 98 F | RESPIRATION RATE: 16 BRPM

## 2023-05-27 PROCEDURE — 99283 EMERGENCY DEPT VISIT LOW MDM: CPT

## 2023-05-27 NOTE — ED PROVIDER NOTE - PATIENT PORTAL LINK FT
You can access the FollowMyHealth Patient Portal offered by [facility] by registering at the following website: [email or web address]. By joining [employer]’s FollowMyHealth portal, you will also be able to view your health information using other applications (apps) compatible with our system.

## 2023-05-27 NOTE — ED PROVIDER NOTE - OBJECTIVE STATEMENT
62-year-old male history of psychiatric disease per patient on Seroquel, and other medications which she does not recall, he is noncompliant, history of crack cocaine use, homeless, , who presents with "hunger pains".  Per patient he is requesting something to eat and a place to rest.  Patient has no acute medical complaints denies abdominal pain nausea vomiting diarrhea, denies chest pain, shortness of breath, denies suicidal or homicidal ideations.  Denies any other recreational drug use.

## 2023-05-27 NOTE — ED PROVIDER NOTE - CLINICAL SUMMARY MEDICAL DECISION MAKING FREE TEXT BOX
Patient presents with complaint of "hunger pain "he notes a history of medical and psychiatric disease noncompliant with medications, homeless, who is stable vital signs and appears comfortable requesting something to eat.  Patient is resting comfortably in exam chair we will continue to monitor.  Patient does not endorse any active suicidal or homicidal ideation.

## 2023-05-27 NOTE — ED ADULT NURSE NOTE - NSFALLUNIVINTERV_ED_ALL_ED
Bed/Stretcher in lowest position, wheels locked, appropriate side rails in place/Call bell, personal items and telephone in reach/Instruct patient to call for assistance before getting out of bed/chair/stretcher/Non-slip footwear applied when patient is off stretcher/[place] to call system/Physically safe environment - no spills, clutter or unnecessary equipment/Purposeful proactive rounding/Room/bathroom lighting operational, light cord in reach

## 2023-05-30 DIAGNOSIS — Z59.00 HOMELESSNESS UNSPECIFIED: ICD-10-CM

## 2023-05-30 DIAGNOSIS — Z13.9 ENCOUNTER FOR SCREENING, UNSPECIFIED: ICD-10-CM

## 2023-05-30 DIAGNOSIS — Z91.148 PATIENT'S OTHER NONCOMPLIANCE WITH MEDICATION REGIMEN FOR OTHER REASON: ICD-10-CM

## 2023-05-30 DIAGNOSIS — F32.A DEPRESSION, UNSPECIFIED: ICD-10-CM

## 2023-05-30 DIAGNOSIS — F41.9 ANXIETY DISORDER, UNSPECIFIED: ICD-10-CM

## 2023-05-30 DIAGNOSIS — R46.0 VERY LOW LEVEL OF PERSONAL HYGIENE: ICD-10-CM

## 2023-05-30 DIAGNOSIS — R63.8 OTHER SYMPTOMS AND SIGNS CONCERNING FOOD AND FLUID INTAKE: ICD-10-CM

## 2023-05-30 SDOH — ECONOMIC STABILITY - HOUSING INSECURITY: HOMELESSNESS UNSPECIFIED: Z59.00

## 2023-06-01 ENCOUNTER — HOSPITAL ENCOUNTER (INPATIENT)
Dept: HOSPITAL 74 - YASAS | Age: 63
LOS: 3 days | Discharge: HOME | DRG: 897 | End: 2023-06-04
Attending: SURGERY | Admitting: ALLERGY & IMMUNOLOGY
Payer: COMMERCIAL

## 2023-06-01 VITALS — BODY MASS INDEX: 24.1 KG/M2

## 2023-06-01 DIAGNOSIS — F10.230: Primary | ICD-10-CM

## 2023-06-01 DIAGNOSIS — F19.282: ICD-10-CM

## 2023-06-01 DIAGNOSIS — F20.9: ICD-10-CM

## 2023-06-01 DIAGNOSIS — F19.24: ICD-10-CM

## 2023-06-01 DIAGNOSIS — F12.20: ICD-10-CM

## 2023-06-01 DIAGNOSIS — E05.90: ICD-10-CM

## 2023-06-01 DIAGNOSIS — B18.2: ICD-10-CM

## 2023-06-01 DIAGNOSIS — F17.210: ICD-10-CM

## 2023-06-01 DIAGNOSIS — Z56.0: ICD-10-CM

## 2023-06-01 DIAGNOSIS — Z59.00: ICD-10-CM

## 2023-06-01 PROCEDURE — HZ2ZZZZ DETOXIFICATION SERVICES FOR SUBSTANCE ABUSE TREATMENT: ICD-10-PCS | Performed by: SURGERY

## 2023-06-01 RX ADMIN — Medication SCH MG: at 22:30

## 2023-06-01 SDOH — ECONOMIC STABILITY - HOUSING INSECURITY: HOMELESSNESS UNSPECIFIED: Z59.00

## 2023-06-01 SDOH — ECONOMIC STABILITY - INCOME SECURITY: UNEMPLOYMENT, UNSPECIFIED: Z56.0

## 2023-06-02 LAB
ALBUMIN SERPL-MCNC: 2.9 G/DL (ref 3.4–5)
ALP SERPL-CCNC: 76 U/L (ref 45–117)
ALT SERPL-CCNC: 29 U/L (ref 13–61)
ANION GAP SERPL CALC-SCNC: 5 MMOL/L (ref 8–16)
AST SERPL-CCNC: 25 U/L (ref 15–37)
BILIRUB SERPL-MCNC: 0.4 MG/DL (ref 0.2–1)
BUN SERPL-MCNC: 8.6 MG/DL (ref 7–18)
CALCIUM SERPL-MCNC: 9.3 MG/DL (ref 8.5–10.1)
CHLORIDE SERPL-SCNC: 108 MMOL/L (ref 98–107)
CO2 SERPL-SCNC: 27 MMOL/L (ref 21–32)
CREAT SERPL-MCNC: 0.8 MG/DL (ref 0.55–1.3)
DEPRECATED RDW RBC AUTO: 14.4 % (ref 11.9–15.9)
GLUCOSE SERPL-MCNC: 132 MG/DL (ref 74–106)
HCT VFR BLD CALC: 39.3 % (ref 35.4–49)
HGB BLD-MCNC: 13.5 GM/DL (ref 11.7–16.9)
MCH RBC QN AUTO: 31.5 PG (ref 25.7–33.7)
MCHC RBC AUTO-ENTMCNC: 34.3 G/DL (ref 32–35.9)
MCV RBC: 91.9 FL (ref 80–96)
PLATELET # BLD AUTO: 334 10^3/UL (ref 134–434)
PMV BLD: 7.8 FL (ref 7.5–11.1)
POTASSIUM SERPLBLD-SCNC: 4.5 MMOL/L (ref 3.5–5.1)
PROT SERPL-MCNC: 6.7 G/DL (ref 6.4–8.2)
RBC # BLD AUTO: 4.27 M/MM3 (ref 4–5.6)
SODIUM SERPL-SCNC: 140 MMOL/L (ref 136–145)
WBC # BLD AUTO: 7.5 K/MM3 (ref 4–10)

## 2023-06-02 RX ADMIN — Medication SCH TAB: at 10:20

## 2023-06-02 RX ADMIN — NICOTINE POLACRILEX PRN MG: 2 GUM, CHEWING ORAL at 18:08

## 2023-06-02 RX ADMIN — Medication SCH MG: at 22:17

## 2023-06-03 RX ADMIN — Medication SCH MG: at 22:30

## 2023-06-03 RX ADMIN — Medication SCH: at 10:24

## 2023-06-03 RX ADMIN — NICOTINE POLACRILEX PRN MG: 2 GUM, CHEWING ORAL at 17:26

## 2023-06-04 VITALS
TEMPERATURE: 97.8 F | DIASTOLIC BLOOD PRESSURE: 96 MMHG | RESPIRATION RATE: 18 BRPM | SYSTOLIC BLOOD PRESSURE: 159 MMHG | HEART RATE: 92 BPM

## 2023-06-04 RX ADMIN — Medication SCH: at 10:11

## 2023-06-04 RX ADMIN — NICOTINE POLACRILEX PRN MG: 2 GUM, CHEWING ORAL at 06:02

## 2023-06-15 ENCOUNTER — EMERGENCY (EMERGENCY)
Facility: HOSPITAL | Age: 63
LOS: 1 days | Discharge: HOME | End: 2023-06-15
Attending: EMERGENCY MEDICINE | Admitting: EMERGENCY MEDICINE
Payer: MEDICARE

## 2023-06-15 VITALS
DIASTOLIC BLOOD PRESSURE: 97 MMHG | HEART RATE: 59 BPM | TEMPERATURE: 98 F | RESPIRATION RATE: 18 BRPM | OXYGEN SATURATION: 97 % | SYSTOLIC BLOOD PRESSURE: 148 MMHG

## 2023-06-15 VITALS
SYSTOLIC BLOOD PRESSURE: 150 MMHG | RESPIRATION RATE: 16 BRPM | OXYGEN SATURATION: 97 % | HEART RATE: 65 BPM | DIASTOLIC BLOOD PRESSURE: 90 MMHG | TEMPERATURE: 98 F

## 2023-06-15 DIAGNOSIS — F19.10 OTHER PSYCHOACTIVE SUBSTANCE ABUSE, UNCOMPLICATED: ICD-10-CM

## 2023-06-15 DIAGNOSIS — Z59.00 HOMELESSNESS UNSPECIFIED: ICD-10-CM

## 2023-06-15 LAB — GLUCOSE BLDC GLUCOMTR-MCNC: 72 MG/DL — SIGNIFICANT CHANGE UP (ref 70–99)

## 2023-06-15 PROCEDURE — 99283 EMERGENCY DEPT VISIT LOW MDM: CPT

## 2023-06-15 SDOH — ECONOMIC STABILITY - HOUSING INSECURITY: HOMELESSNESS UNSPECIFIED: Z59.00

## 2023-06-15 NOTE — ED ADULT NURSE NOTE - OBJECTIVE STATEMENT
Patient is a 62 y/o M c/o altered mental status. Patient BIBEMS for altered mental status from bar. Patient reports etoh usage and defected in pants. patient ambulates with steady gait. patient has no obvious injuries to face or body.

## 2023-06-15 NOTE — ED PROVIDER NOTE - PHYSICAL EXAMINATION
Constitutional:  Malodorous, feces on pants  HEENT: Airway patent, Nasal mucosa clear. Mouth with normal mucosa.   Eyes: Clear bilaterally, pupils equal, round and reactive to light.  Cardiac: Normal rate, regular rhythm.  Respiratory: Breath sounds clear and equal bilaterally.  GI: Abdomen soft, non-tender, no guarding.  MSK: Spine appears normal, range of motion is not limited, no muscle or joint tenderness  Neuro: Alert and oriented, no focal deficits, no motor or sensory deficits.  Skin: Skin normal color for race, warm, dry and intact. No evidence of rash.

## 2023-06-15 NOTE — ED ADULT NURSE REASSESSMENT NOTE - NS ED NURSE REASSESS COMMENT FT1
received handoff from matilde javed rn. Patient in no acute distress. Pending discharge. care ongoing

## 2023-06-15 NOTE — ED PROVIDER NOTE - PATIENT PORTAL LINK FT
You can access the FollowMyHealth Patient Portal offered by [facility] by registering at the following website: [email or web address]. By joining [employer]’s FollowMyHealth portal, you will also be able to view your health information using other applications (apps) compatible with our system.

## 2023-06-15 NOTE — ED PROVIDER NOTE - CLINICAL SUMMARY MEDICAL DECISION MAKING FREE TEXT BOX
A/P: Pt. presenting to E.D. for substance abuse  --No evidence of external trauma  --Plan to observe in E.D. for clinical sobriety

## 2023-06-15 NOTE — ED PROVIDER NOTE - OBJECTIVE STATEMENT
63-year-old male, homeless, presented to the emergency room for alcohol and substance abuse.  Patient has no complaints at the time, requesting a bed to sleep overnight.

## 2023-06-15 NOTE — ED ADULT TRIAGE NOTE - CHIEF COMPLAINT QUOTE
pt. picked up from in front of a bar by [facility] after [facility] called for him. Pt. endorses drinking alcohol this evening and also defecated in his pants. Pt. ambulating with steady gait.

## 2023-06-15 NOTE — ED PROVIDER NOTE - PROGRESS NOTE DETAILS
Patient improved, eating sandwich, drinking juices, walking with steady gait.  Steady for discharge.

## 2023-06-15 NOTE — ED ADULT NURSE NOTE - NSFALLRISKINTERV_ED_ALL_ED
Assistance OOB with selected safe patient handling equipment if applicable/Assistance with ambulation/Communicate fall risk and risk factors to all staff, patient, and family/Monitor gait and stability/Monitor for mental status changes and reorient to person, place, and time, as needed/Provide visual cue: yellow wristband, yellow gown, etc/Reinforce activity limits and safety measures with patient and family/Toileting schedule using arm’s reach rule for commode and bathroom/Use of alarms - bed, stretcher, chair and/or video monitoring/Call bell, personal items and telephone in reach/Instruct patient to call for assistance before getting out of bed/chair/stretcher/Non-slip footwear applied when patient is off stretcher/[place] to call system/Physically safe environment - no spills, clutter or unnecessary equipment/Purposeful Proactive Rounding/Room/bathroom lighting operational, light cord in reach

## 2023-06-15 NOTE — ED ADULT NURSE NOTE - NS ED NURSE LEVEL OF CONSCIOUSNESS MENTAL STATUS
Condition:: History and Physical
Please Describe Your Condition:: Patient presents for history and physical prior to Mohs surgery repair.
Awake/Alert/Cooperative

## 2023-06-15 NOTE — ED ADULT NURSE NOTE - CHIEF COMPLAINT QUOTE
pt. picked up from in front of a bar by [facility] after [facility] called for him. Pt. endorses drinking alcohol this evening and also defecated in his pants. Pt. ambulating with steady gait.

## 2023-07-31 NOTE — ED PROVIDER NOTE - CROS ED CONS ALL NEG
Seen by NP at registration desk, declined ambulance transport at this time.       Miroslava Ramon LPN  76/13/21 [id number]
negative...

## 2023-08-04 NOTE — ED BEHAVIORAL HEALTH ASSESSMENT NOTE - REFERRED BY
Acute Care - Physical Therapy Initial Evaluation   Kelly     Patient Name: Andrew Hart  : 1948  MRN: [id number]  Today's Date: 2023      Visit Dx:     ICD-10-CM ICD-9-CM   1. Acute congestive heart failure, unspecified heart failure type  I50.9 428.0   2. Atrial flutter, unspecified type  I48.92 427.32   3. Decreased activities of daily living (ADL)  Z78.9 V49.89   4. Difficulty walking  R26.2 719.7     Patient Active Problem List   Diagnosis    Severe persistent asthma with acute exacerbation    SOB (shortness of breath)    Chronic cough    Atrial flutter    Volume overload     Past Medical History:   Diagnosis Date    Asthma, intrinsic     PTSD (post-traumatic stress disorder)     Renal stone      Past Surgical History:   Procedure Laterality Date    APPENDECTOMY      ORTHOPEDIC SURGERY Right     ARM    TRIGGER FINGER RELEASE Left      PT Assessment (last 12 hours)       PT Evaluation and Treatment       Row Name 23 1416          Physical Therapy Time and Intention    Subjective Information no complaints  -DP     Document Type evaluation  -DP     Mode of Treatment individual therapy;physical therapy  -DP     Patient Effort good  -DP       Row Name 23 1416          General Information    Patient Profile Reviewed yes  -DP     Patient Observations alert;cooperative;agree to therapy  -DP     Prior Level of Function min assist:;ADL's;independent:;transfer;gait  pt sleeps in the recliner and wife assists with ADLs.  -DP     Equipment Currently Used at Home cane, straight  -DP     Existing Precautions/Restrictions fall  -DP     Barriers to Rehab none identified  -DP       Row Name 23 1416          Living Environment    Current Living Arrangements home  -DP     People in Home spouse  -DP       Row Name 23 1416          Range of Motion (ROM)    Range of Motion bilateral lower extremities;ROM is WFL  -DP       Row Name 23 1416          Strength (Manual Muscle Testing)     Strength (Manual Muscle Testing) bilateral lower extremities;strength is WFL  -DP       Row Name 08/04/23 1416          Bed Mobility    Bed Mobility supine-sit-supine  -DP     Comment, (Bed Mobility) pt up in recliner. Reported that he sleeps in the recliner.  -DP       Row Name 08/04/23 1416          Transfers    Transfers sit-stand transfer  -DP       Row Name 08/04/23 1416          Sit-Stand Transfer    Sit-Stand [place] (Transfers) standby assist  -DP       Row Name 08/04/23 1416          Stand-Sit Transfer    Stand-Sit [place] (Transfers) standby assist  -DP       Row Name 08/04/23 1416          Gait/Stairs (Locomotion)    Gait/Stairs Locomotion gait/ambulation assistive device  -DP     [place] Level (Gait) contact guard  -DP     Assistive Device (Gait) other (see comments)  hand held assistance  -DP     Distance in Feet (Gait) 10  -DP     Comment, (Gait/Stairs) Ambulation distance limited-heart rate increased to 122  -DP       Row Name 08/04/23 1416          Balance    Dynamic Standing Balance standby assist  -DP       Row Name 08/04/23 1500          Plan of Care Review    Plan of Care Reviewed With patient  -DP     Outcome Evaluation Patient presents with minimal deficits with ambulation and balance.  He will benefit from inpatient PT services and home health services upon return home.  -DP       Row Name 08/04/23 1416          Therapy Assessment/Plan (PT)    Rehab Potential (PT) good, to achieve stated therapy goals  -DP     Criteria for Skilled Interventions Met (PT) yes;meets criteria  -DP     Therapy Frequency (PT) daily  -DP     Predicted Duration of Therapy Intervention (PT) 10 days  -DP     Problem List (PT) problems related to;balance;cognition;mobility;strength  -DP     Activity Limitations Related to Problem List (PT) unable to transfer safely;unable to ambulate safely  -DP       Row Name 08/04/23 1416          PT Evaluation Complexity    History, PT Evaluation Complexity no personal  factors and/or comorbidities  -DP     Examination of Body Systems (PT Eval Complexity) total of 4 or more elements  -DP     Clinical Presentation (PT Evaluation Complexity) stable  -DP     Clinical Decision Making (PT Evaluation Complexity) low complexity  -DP     Overall Complexity (PT Evaluation Complexity) low complexity  -DP       Row Name 08/04/23 1416          Physical Therapy Goals    Gait Training Goal Selection (PT) gait training, PT goal 1  -DP       Row Name 08/04/23 1416          Gait Training Goal 1 (PT)    Activity/Assistive Device (Gait Training Goal 1, PT) assistive device use;cane, straight  -DP     [place] Level (Gait Training Goal 1, PT) supervision required  -DP     Distance (Gait Training Goal 1, PT) 200  -DP     Time Frame (Gait Training Goal 1, PT) 10 days  -DP               User Key  (r) = Recorded By, (t) = Taken By, (c) = Cosigned By      Initials Name Provider Type    DP Altagracia Resendiz, PT Physical Therapist                      PT Recommendation and Plan  Anticipated Discharge Disposition (PT): home with home health  Planned Therapy Interventions (PT): gait training, bed mobility training, balance training, strengthening, transfer training  Therapy Frequency (PT): daily  Plan of Care Reviewed With: patient  Outcome Evaluation: Patient presents with minimal deficits with ambulation and balance.  He will benefit from inpatient PT services and home health services upon return home.   Outcome Measures       Row Name 08/04/23 1500             How much help from another person do you currently need...    Turning from your back to your side while in flat bed without using bedrails? 4  -DP      Moving from lying on back to sitting on the side of a flat bed without bedrails? 3  -DP      Moving to and from a bed to a chair (including a wheelchair)? 4  -DP      Standing up from a chair using your arms (e.g., wheelchair, bedside chair)? 4  -DP      Climbing 3-5 steps with a railing? 3  -DP       To walk in hospital room? 3  -DP      AM-PAC 6 Clicks Score (PT) 21  -DP         Functional Assessment    Outcome Measure Options AM-PAC 6 Clicks Basic Mobility (PT)  -DP                User Key  (r) = Recorded By, (t) = Taken By, (c) = Cosigned By      Initials Name Provider Type    Altagracia Wheeler, PT Physical Therapist                     Time Calculation:    PT Charges       Row Name 08/04/23 1509             Time Calculation    PT Received On 08/04/23  -DP      PT Goal Re-Cert Due Date 08/13/23  -DP         Untimed Charges    PT Eval/Re-eval Minutes 40  -DP         Total Minutes    Untimed Charges Total Minutes 40  -DP       Total Minutes 40  -DP                User Key  (r) = Recorded By, (t) = Taken By, (c) = Cosigned By      Initials Name Provider Type    Altagracia Wheeler, PT Physical Therapist                      PT G-Codes  Outcome Measure Options: AM-PAC 6 Clicks Basic Mobility (PT)  AM-PAC 6 Clicks Score (PT): 21  AM-PAC 6 Clicks Score (OT): 19    Altagracia Resendiz, PT  8/4/2023     Self

## 2023-08-14 NOTE — ED ADULT NURSE NOTE - NS PRO AD BILL OF RIGHTS
[Home] : at home, [unfilled] , at the time of the visit. [Medical Office: ([facility])___] : at the medical office located in  [Patient] : the patient [Follow-Up] : a follow-up visit Yes

## 2023-09-03 ENCOUNTER — EMERGENCY (EMERGENCY)
Facility: HOSPITAL | Age: 63
LOS: 1 days | Discharge: HOME | End: 2023-09-03
Attending: EMERGENCY MEDICINE | Admitting: EMERGENCY MEDICINE
Payer: MEDICARE

## 2023-09-03 VITALS
HEART RATE: 84 BPM | DIASTOLIC BLOOD PRESSURE: 68 MMHG | SYSTOLIC BLOOD PRESSURE: 115 MMHG | TEMPERATURE: 98 F | OXYGEN SATURATION: 96 % | RESPIRATION RATE: 16 BRPM

## 2023-09-03 VITALS
TEMPERATURE: 98 F | OXYGEN SATURATION: 99 % | RESPIRATION RATE: 16 BRPM | DIASTOLIC BLOOD PRESSURE: 80 MMHG | SYSTOLIC BLOOD PRESSURE: 121 MMHG | HEART RATE: 80 BPM

## 2023-09-03 PROCEDURE — 99285 EMERGENCY DEPT VISIT HI MDM: CPT

## 2023-09-03 PROCEDURE — 99283 EMERGENCY DEPT VISIT LOW MDM: CPT

## 2023-09-03 PROCEDURE — 82962 GLUCOSE BLOOD TEST: CPT

## 2023-09-03 NOTE — ED ADULT NURSE NOTE - NSFALLHARMRISKINTERV_ED_ALL_ED

## 2023-09-03 NOTE — ED ADULT NURSE REASSESSMENT NOTE - NS ED NURSE REASSESS COMMENT FT1
Received report from VALERIE Roth. Received patient in stretcher. AOX4. Vital signs as noted in flowsheet.  Patient denies chest pain, pain, discomfort, shortness of breath, difficulty breathing and any form of distress not noted. Patient oriented to ED area. Plan of care discussed and verbalized understanding. All needs attended. Purposeful proactive hourly rounding in progress. Constant observation ongoing.

## 2023-09-03 NOTE — ED PROVIDER NOTE - CLINICAL SUMMARY MEDICAL DECISION MAKING FREE TEXT BOX
clinically intoxicated without trauma.  accucheck wnl.  no signs of infection clinically.  protecting airway. observed for sobriety.  reported hearing voices telling him to harm self earlier. prior visits with similar. thought to be substance induced. reassessment with improved mental status.  alert and oriented x3 with steady gait at time of discharge clinically intoxicated without trauma.  accucheck wnl.  no signs of infection clinically.  protecting airway. observed for sobriety.  reported hearing voices telling him to harm self earlier. prior visits with similar. per prior psych eval in ED "Pt with multiple prior similar presentations with concern for substance-induced symptomatology and malingering.  He has recurrent similar presentations without demonstration of intent to adhere with outpatient referrals and follow up.  As per previous evaluations, there is no reasonable improvement expected to be gained by hospitalization at this time.  Pt remains at chronically elevated risk of harm to self/others given risk factors outline above.  Risk will be mitigated by transfer directly to detox/rehab program.  Pt also has pattern of help seeking  behavior and frequents various EDs across the city."  Presentation today same. Given food in ED, mental status improved. Clinically sober at time of dc. Referral to detox given. return precautions discussed

## 2023-09-03 NOTE — ED PROVIDER NOTE - NSFOLLOWUPINSTRUCTIONS_ED_ALL_ED_FT
Please see your primary care provider for followup.  Call for appointment.  If you have any problems with followup, please call the ED Referral Coordinator at [phone].  Return to the ER if symptoms worsen or other concerns.    Alcohol Abuse    Alcohol intoxication occurs when the amount of alcohol that a person has consumed impairs his or her ability to mentally and physically function. Chronic alcohol consumption can also lead to a variety of health issues including neurological disease, stomach disease, heart disease, liver disease, etc. Do not drive after drinking alcohol. Drinking enough alcohol to end up in an Emergency Room suggests you may have an alcohol abuse problem. Seek help at a drug addiction center.    SEEK IMMEDIATE MEDICAL CARE IF YOU HAVE ANY OF THE FOLLOWING SYMPTOMS: seizures, vomiting blood, blood in your stool, lightheadedness/dizziness, or becoming shaky to tremulous when you stop drinking.

## 2023-09-03 NOTE — ED ADULT NURSE NOTE - OBJECTIVE STATEMENT
Patient presents to the ED complaining of falling in the street and hitting his head, patient reports to drinking alcohol. Patient states that he is hearing voices telling him to hurt himself. Patient placed on 1:1.

## 2023-09-03 NOTE — ED ADULT TRIAGE NOTE - CHIEF COMPLAINT QUOTE
BIBEMS for AMS. Admits to drinking 2 pints of alcohol and using crack. Hx of schizophrenia, endorses command hallucinations telling him to kill himself.

## 2023-09-03 NOTE — ED PROVIDER NOTE - PATIENT PORTAL LINK FT
You can access the FollowMyHealth Patient Portal offered by [facility] by registering at the following website: [email or web address]. By joining [facility]’s FollowMyHealth portal, you will also be able to view your health information using other applications (apps) compatible with our system.

## 2023-09-03 NOTE — ED PROVIDER NOTE - OBJECTIVE STATEMENT
history of psychiatric disease per patient on Seroquel, and other medications which she does not recall, he is noncompliant, history of crack cocaine use, homeless, , alcohol abuse, here after drinking alcohol today. Says he had a few pints today. Hears voices, sometimes tell him to hurt himself. Unable to provide other history due to altered mental status.

## 2023-09-05 DIAGNOSIS — R41.82 ALTERED MENTAL STATUS, UNSPECIFIED: ICD-10-CM

## 2023-09-05 DIAGNOSIS — F20.9 SCHIZOPHRENIA, UNSPECIFIED: ICD-10-CM

## 2023-09-05 DIAGNOSIS — I10 ESSENTIAL (PRIMARY) HYPERTENSION: ICD-10-CM

## 2023-09-05 DIAGNOSIS — Z86.59 PERSONAL HISTORY OF OTHER MENTAL AND BEHAVIORAL DISORDERS: ICD-10-CM

## 2023-09-05 DIAGNOSIS — Z86.19 PERSONAL HISTORY OF OTHER INFECTIOUS AND PARASITIC DISEASES: ICD-10-CM

## 2023-09-05 DIAGNOSIS — E03.9 HYPOTHYROIDISM, UNSPECIFIED: ICD-10-CM

## 2023-09-05 DIAGNOSIS — F10.129 ALCOHOL ABUSE WITH INTOXICATION, UNSPECIFIED: ICD-10-CM

## 2023-09-05 DIAGNOSIS — Z59.00 HOMELESSNESS UNSPECIFIED: ICD-10-CM

## 2023-09-05 SDOH — ECONOMIC STABILITY - HOUSING INSECURITY: HOMELESSNESS UNSPECIFIED: Z59.00

## 2023-10-06 ENCOUNTER — EMERGENCY (EMERGENCY)
Facility: HOSPITAL | Age: 63
LOS: 1 days | Discharge: HOME | End: 2023-10-06
Attending: STUDENT IN AN ORGANIZED HEALTH CARE EDUCATION/TRAINING PROGRAM | Admitting: STUDENT IN AN ORGANIZED HEALTH CARE EDUCATION/TRAINING PROGRAM
Payer: MEDICARE

## 2023-10-06 VITALS
SYSTOLIC BLOOD PRESSURE: 150 MMHG | DIASTOLIC BLOOD PRESSURE: 93 MMHG | TEMPERATURE: 97 F | OXYGEN SATURATION: 98 % | HEART RATE: 84 BPM | RESPIRATION RATE: 18 BRPM

## 2023-10-06 VITALS
HEART RATE: 92 BPM | RESPIRATION RATE: 17 BRPM | SYSTOLIC BLOOD PRESSURE: 138 MMHG | OXYGEN SATURATION: 98 % | TEMPERATURE: 98 F | DIASTOLIC BLOOD PRESSURE: 72 MMHG

## 2023-10-06 DIAGNOSIS — F29 UNSPECIFIED PSYCHOSIS NOT DUE TO A SUBSTANCE OR KNOWN PHYSIOLOGICAL CONDITION: ICD-10-CM

## 2023-10-06 DIAGNOSIS — F10.90 ALCOHOL USE, UNSPECIFIED, UNCOMPLICATED: ICD-10-CM

## 2023-10-06 LAB
ALBUMIN SERPL ELPH-MCNC: 4.2 G/DL — SIGNIFICANT CHANGE UP (ref 3.3–5)
ALP SERPL-CCNC: 128 U/L — HIGH (ref 40–120)
ALT FLD-CCNC: 32 U/L — SIGNIFICANT CHANGE UP (ref 10–45)
ANION GAP SERPL CALC-SCNC: 19 MMOL/L — HIGH (ref 5–17)
APAP SERPL-MCNC: 11 UG/ML — SIGNIFICANT CHANGE UP (ref 10–30)
AST SERPL-CCNC: 43 U/L — HIGH (ref 10–40)
BASOPHILS # BLD AUTO: 0.02 K/UL — SIGNIFICANT CHANGE UP (ref 0–0.2)
BASOPHILS NFR BLD AUTO: 0.1 % — SIGNIFICANT CHANGE UP (ref 0–2)
BILIRUB SERPL-MCNC: 1.3 MG/DL — HIGH (ref 0.2–1.2)
BUN SERPL-MCNC: 12 MG/DL — SIGNIFICANT CHANGE UP (ref 7–23)
CALCIUM SERPL-MCNC: 9.8 MG/DL — SIGNIFICANT CHANGE UP (ref 8.4–10.5)
CHLORIDE SERPL-SCNC: 98 MMOL/L — SIGNIFICANT CHANGE UP (ref 96–108)
CO2 SERPL-SCNC: 17 MMOL/L — LOW (ref 22–31)
CREAT SERPL-MCNC: 0.67 MG/DL — SIGNIFICANT CHANGE UP (ref 0.5–1.3)
EGFR: 104 ML/MIN/1.73M2 — SIGNIFICANT CHANGE UP
EOSINOPHIL # BLD AUTO: 0.01 K/UL — SIGNIFICANT CHANGE UP (ref 0–0.5)
EOSINOPHIL NFR BLD AUTO: 0.1 % — SIGNIFICANT CHANGE UP (ref 0–6)
ETHANOL SERPL-MCNC: <10 MG/DL — SIGNIFICANT CHANGE UP (ref 0–10)
GLUCOSE SERPL-MCNC: 120 MG/DL — HIGH (ref 70–99)
HCT VFR BLD CALC: 37 % — LOW (ref 39–50)
HGB BLD-MCNC: 12.6 G/DL — LOW (ref 13–17)
IMM GRANULOCYTES NFR BLD AUTO: 0.4 % — SIGNIFICANT CHANGE UP (ref 0–0.9)
LACTATE SERPL-SCNC: 1.5 MMOL/L — SIGNIFICANT CHANGE UP (ref 0.5–2)
LIDOCAIN IGE QN: 34 U/L — SIGNIFICANT CHANGE UP (ref 7–60)
LYMPHOCYTES # BLD AUTO: 0.91 K/UL — LOW (ref 1–3.3)
LYMPHOCYTES # BLD AUTO: 5.3 % — LOW (ref 13–44)
MCHC RBC-ENTMCNC: 29.6 PG — SIGNIFICANT CHANGE UP (ref 27–34)
MCHC RBC-ENTMCNC: 34.1 GM/DL — SIGNIFICANT CHANGE UP (ref 32–36)
MCV RBC AUTO: 87.1 FL — SIGNIFICANT CHANGE UP (ref 80–100)
MONOCYTES # BLD AUTO: 1.05 K/UL — HIGH (ref 0–0.9)
MONOCYTES NFR BLD AUTO: 6.1 % — SIGNIFICANT CHANGE UP (ref 2–14)
NEUTROPHILS # BLD AUTO: 15.06 K/UL — HIGH (ref 1.8–7.4)
NEUTROPHILS NFR BLD AUTO: 88 % — HIGH (ref 43–77)
NRBC # BLD: 0 /100 WBCS — SIGNIFICANT CHANGE UP (ref 0–0)
PLATELET # BLD AUTO: 230 K/UL — SIGNIFICANT CHANGE UP (ref 150–400)
POTASSIUM SERPL-MCNC: 3.8 MMOL/L — SIGNIFICANT CHANGE UP (ref 3.5–5.3)
POTASSIUM SERPL-SCNC: 3.8 MMOL/L — SIGNIFICANT CHANGE UP (ref 3.5–5.3)
PROT SERPL-MCNC: 8.5 G/DL — HIGH (ref 6–8.3)
RBC # BLD: 4.25 M/UL — SIGNIFICANT CHANGE UP (ref 4.2–5.8)
RBC # FLD: 14.4 % — SIGNIFICANT CHANGE UP (ref 10.3–14.5)
SALICYLATES SERPL-MCNC: <0.3 MG/DL — LOW (ref 2.8–20)
SODIUM SERPL-SCNC: 134 MMOL/L — LOW (ref 135–145)
WBC # BLD: 17.12 K/UL — HIGH (ref 3.8–10.5)
WBC # FLD AUTO: 17.12 K/UL — HIGH (ref 3.8–10.5)

## 2023-10-06 PROCEDURE — 80053 COMPREHEN METABOLIC PANEL: CPT

## 2023-10-06 PROCEDURE — 99284 EMERGENCY DEPT VISIT MOD MDM: CPT | Mod: 25

## 2023-10-06 PROCEDURE — 71045 X-RAY EXAM CHEST 1 VIEW: CPT

## 2023-10-06 PROCEDURE — 71045 X-RAY EXAM CHEST 1 VIEW: CPT | Mod: 26

## 2023-10-06 PROCEDURE — 83690 ASSAY OF LIPASE: CPT

## 2023-10-06 PROCEDURE — 74177 CT ABD & PELVIS W/CONTRAST: CPT | Mod: MA

## 2023-10-06 PROCEDURE — 36415 COLL VENOUS BLD VENIPUNCTURE: CPT

## 2023-10-06 PROCEDURE — 90792 PSYCH DIAG EVAL W/MED SRVCS: CPT

## 2023-10-06 PROCEDURE — 99285 EMERGENCY DEPT VISIT HI MDM: CPT

## 2023-10-06 PROCEDURE — 82962 GLUCOSE BLOOD TEST: CPT

## 2023-10-06 PROCEDURE — 80307 DRUG TEST PRSMV CHEM ANLYZR: CPT

## 2023-10-06 PROCEDURE — 87040 BLOOD CULTURE FOR BACTERIA: CPT

## 2023-10-06 PROCEDURE — 83605 ASSAY OF LACTIC ACID: CPT

## 2023-10-06 PROCEDURE — 74177 CT ABD & PELVIS W/CONTRAST: CPT | Mod: 26,MA

## 2023-10-06 PROCEDURE — 85025 COMPLETE CBC W/AUTO DIFF WBC: CPT

## 2023-10-06 RX ORDER — ACETAMINOPHEN 500 MG
650 TABLET ORAL ONCE
Refills: 0 | Status: COMPLETED | OUTPATIENT
Start: 2023-10-06 | End: 2023-10-06

## 2023-10-06 RX ORDER — SODIUM CHLORIDE 9 MG/ML
1000 INJECTION INTRAMUSCULAR; INTRAVENOUS; SUBCUTANEOUS ONCE
Refills: 0 | Status: COMPLETED | OUTPATIENT
Start: 2023-10-06 | End: 2023-10-06

## 2023-10-06 RX ORDER — FAMOTIDINE 10 MG/ML
20 INJECTION INTRAVENOUS ONCE
Refills: 0 | Status: COMPLETED | OUTPATIENT
Start: 2023-10-06 | End: 2023-10-06

## 2023-10-06 RX ORDER — FAMOTIDINE 10 MG/ML
20 INJECTION INTRAVENOUS DAILY
Refills: 0 | Status: DISCONTINUED | OUTPATIENT
Start: 2023-10-06 | End: 2023-10-06

## 2023-10-06 RX ADMIN — SODIUM CHLORIDE 1000 MILLILITER(S): 9 INJECTION INTRAMUSCULAR; INTRAVENOUS; SUBCUTANEOUS at 16:07

## 2023-10-06 RX ADMIN — Medication 650 MILLIGRAM(S): at 13:43

## 2023-10-06 RX ADMIN — FAMOTIDINE 20 MILLIGRAM(S): 10 INJECTION INTRAVENOUS at 13:44

## 2023-10-06 RX ADMIN — Medication 650 MILLIGRAM(S): at 14:13

## 2023-10-06 NOTE — ED ADULT NURSE REASSESSMENT NOTE - NS ED NURSE REASSESS COMMENT FT1
pt urinated on the floor without reason. states "I couldn't wait." pt educated that it is inappropriate to urinate on the floor. understanding not verbalized. pt pending CT results

## 2023-10-06 NOTE — ED ADULT NURSE NOTE - NSFALLRISKINTERV_ED_ALL_ED

## 2023-10-06 NOTE — ED PROVIDER NOTE - PROGRESS NOTE DETAILS
pt cleared by psyc   labs sig for elevated wbcs, lactate neg. Pt found eating in ED.   CT shows constipation ?fecal impaction. Pt states he is having normal BMs, not constipated

## 2023-10-06 NOTE — ED PROVIDER NOTE - CLINICAL SUMMARY MEDICAL DECISION MAKING FREE TEXT BOX
62 yo m with pmh of etoh/drug abuse, ?psyc hx of seroquel c/o hearing voices telling him to kill himself. plan to jump in front of train. Also c/o generalized abd pain for the past few days. States he vomited today. Denies fever, chills, cp, sob, diarrhea, VH, HI. Denies drug or alcohol use. VSS. Pt unkempt, NAD. Abd soft, nt, nd. Labs, psyc eval.

## 2023-10-06 NOTE — ED BEHAVIORAL HEALTH ASSESSMENT NOTE - HPI (INCLUDE ILLNESS QUALITY, SEVERITY, DURATION, TIMING, CONTEXT, MODIFYING FACTORS, ASSOCIATED SIGNS AND SYMPTOMS)
62yo man    On interview, pt found lying in hospital stretcher in ED hallway, uncomfortable appearing, oriented to self and location, not to age ("69") or date ("I don't know"). Pt reports that a bystander called EMS after he was found lying on the sidewalk due to abdominal pain. He reports that he has been experiencing this pain for "a week" and has never had it evaluated before. He reports that he endorsed command hallucinations to ED staff because these voices are always present for him, though better with medication. He states that he "always" hears commands to kill himself, including the occasional command to jump in front of a train, but he is able to ignore the commands and has no current or recent intent to harm himself or end his life. He denies past suicide attempts. He cites fear of death as his primary protective factor.     Pt describes his mood as "very depressed" and states that [facility] and treated with Seroquel 400mg and trazodone 100mg with good effect, residual CAH but improved distress. He is unclear about whether he has followed up OP since that time and cannot recall the name of any providers. He is adamant that he is not in the ED for psychiatric evaluation and shouts that he would like to receive his medication (Seroquel) and be left alone. he does not provide any collateral contacts. 64yo man, undomiciled, unemployed, [employer] , previously in outpt care at the [facility], with a history of alcohol, cocaine use disorders, possible psychotic disorder, prior concern for neurocognitive impairment/?TBI, ?of HCV and seizure disorder (on Keppra as outpt), no known h/o suicide attempts, significant documented past concern for malingered symptoms to obtain shelter/hospital admission including prior [place] ED visit in 6/2023, [facility] admission and [facility] in 20[facility], many past ED visits with alcohol/other substance intoxication, who presents BIBEMS for evaluation of abdominal pain, also endorsing SI and CAH. Psychiatry consulted for evaluation.     Pt known to writer from prior ED evaluations. On interview, pt found lying in hospital stretcher in ED hallway, uncomfortable appearing, oriented to self and location, not to age ("69") or date ("I don't know"). Pt reports that a bystander called EMS after he was found lying on the sidewalk due to abdominal pain. He reports that he has been experiencing this pain and vomiting for "a week" and has never had it evaluated before. He reports that he endorsed command hallucinations to ED staff because these voices are always present for him, though better with medication. He states that he "always" hears a male voice commanding to kill himself, including the occasional command to jump in front of a train, but he is able to ignore the commands and has no current or recent intent to harm himself or end his life. He denies past suicide attempts. He cites fear of death as his primary protective factor.     Pt describes his mood as "very depressed" and states that [facility] and treated with Seroquel 400mg and trazodone 100mg with good effect, residual CAH but improved distress. He is unclear about whether he has followed up OP (intermittently states yes and no) since that time and cannot recall the name of any providers. He declines to discuss other specific psychiatric symptoms, is adamant that he is not in the ED for psychiatric evaluation. He shouts that he would like to receive his medication (Seroquel) and to be left alone. He does not provide any collateral contacts.    Per psyckes - All Hospital and Crisis Utilization • 5 Years  ER Visits# ProvidersLast ER Visit  118Behavioral Ruhijt285/11/2023 at [facility]  41Gtjonzo86/15/2023 at [facility]  Inpatient Admissions# ProvidersLast Inpatient Admission  29Behavioral Rjumza754/11/2023 at [facility] for 14 days  3Kwyfnsd51/30/2023 at [facility]    Diagnoses - Behavioral Health (5)  Most Recent: Adjustment Disorder • Schizoaffective Disorder • Schizophrenia • Unspecified/Other Psychotic Disorders • Other Mental Disorders  Most Frequent (# of services): Schizophrenia (13) • Unspecified/Other Psychotic Disorders (3) • Schizoaffective Disorder (2) • Adjustment Disorder (2) • Other Mental Disorders (1) 62yo man, undomiciled, unemployed, [employer] , previously in outpt care at the [facility], with a history of alcohol, cocaine use disorders, possible psychotic disorder, prior concern for neurocognitive impairment/?TBI, ?of HCV and seizure disorder (on Keppra as outpt), no known h/o suicide attempts, significant documented past concern for malingered symptoms to obtain shelter/hospital admission including prior [place] ED visit in 6/2023, [facility] admission and [facility] admission in [facility], many past ED visits with alcohol/other substance intoxication, who presents BIBEMS for evaluation of abdominal pain, also endorsing SI and CAH. Psychiatry consulted for evaluation.     Pt known to writer from prior ED evaluations. On interview, pt found lying in hospital stretcher in ED hallway, uncomfortable appearing, oriented to self and location, not to age ("69") or date ("I don't know"). Pt reports that a bystander called EMS after he was found lying on the sidewalk due to abdominal pain. He reports that he has been experiencing this pain and vomiting for "a week" and has never had it evaluated before. He reports that he endorsed command hallucinations to ED staff because these voices are always present for him, though better with medication. He states that he "always" hears a male voice commanding to kill himself, including the occasional command to jump in front of a train, but he is able to ignore the commands and has no current or recent intent to harm himself or end his life. He denies past suicide attempts. He cites fear of death as his primary protective factor.     Pt describes his mood as "very depressed" and states that [facility] and treated with Seroquel 400mg and trazodone 100mg with good effect, residual CAH but improved distress. He is unclear about whether he has followed up OP (intermittently states yes and no) since that time and cannot recall the name of any providers. He declines to discuss other specific psychiatric symptoms, is adamant that he is not in the ED for psychiatric evaluation. He shouts that he would like to receive his medication (Seroquel) and to be left alone. He does not provide any collateral contacts.    Attempted to reach the [facility] at listed number online [phone], ext. [phone] - unable to reach any provider.    Per psyckes - All Hospital and Crisis Utilization • 5 Years  ER Visits# ProvidersLast ER Visit  118Behavioral Tpwwqz938/11/2023 at [facility]  03Tvejhsc28/15/2023 at [facility]  Inpatient Admissions# ProvidersLast Inpatient Admission  29Behavioral Zsikrj022/11/2023 at [facility] for 14 days  7Psjfrtl99/30/2023 at [facility]    Diagnoses - Behavioral Health (5)  Most Recent: Adjustment Disorder • Schizoaffective Disorder • Schizophrenia • Unspecified/Other Psychotic Disorders • Other Mental Disorders  Most Frequent (# of services): Schizophrenia (13) • Unspecified/Other Psychotic Disorders (3) • Schizoaffective Disorder (2) • Adjustment Disorder (2) • Other Mental Disorders (1) 64yo man, undomiciled, unemployed, [employer] , previously in outpt care at the [facility], with a history of alcohol, cocaine use disorders, possible psychotic disorder, prior concern for neurocognitive impairment/?TBI, ?of HCV and seizure disorder (on Keppra as outpt), no known h/o suicide attempts, significant documented past concern for malingered symptoms to obtain shelter/hospital admission including prior [place] ED visit in 6/2023, [facility] admission and [facility] admission in 2022, many past ED visits with alcohol/other substance intoxication, who presents BIBEMS for evaluation of abdominal pain, also endorsing SI and CAH. Psychiatry consulted for evaluation.     Pt known to writer from prior ED evaluations. On interview, pt found lying in hospital stretcher in ED hallway, uncomfortable appearing, oriented to self and location, not to age ("69") or date ("I don't know"). Pt reports that a bystander called EMS after he was found lying on the sidewalk due to abdominal pain. He reports that he has been experiencing this pain and vomiting for "a week" and has never had it evaluated before. He reports that he endorsed command hallucinations to ED staff because these voices are always present for him, though better with medication. He states that he "always" hears a male voice commanding to kill himself, including the occasional command to jump in front of a train, but he is able to ignore the commands and has no current or recent intent to harm himself or end his life. He denies past suicide attempts. He cites fear of death as his primary protective factor.     Pt describes his mood as "very depressed" and states that [facility] and treated with Seroquel 400mg and trazodone 100mg with good effect, residual CAH but improved distress. He is unclear about whether he has followed up OP (intermittently states yes and no) since that time and cannot recall the name of any providers. He declines to discuss other specific psychiatric symptoms, is adamant that he is not in the ED for psychiatric evaluation. He shouts that he would like to receive his medication (Seroquel) and to be left alone. He does not provide any collateral contacts.    Attempted to reach the [facility] at listed number online [phone], ext. [phone] - unable to reach any provider.    Per Sonido PAYNE review, Pt evaluated by psychiatry at [facility] ED on 9/1/2023 for SI, dx with alcohol use disorder, unspecified psychotic d/o, and unspecific personality d/o, cleared for dc.    Per psyckes - All Hospital and Crisis Utilization • 5 Years  ER Visits# ProvidersLast ER Visit  118Behavioral Jektil773/11/2023 at [facility]  10Ijfavlj09/15/2023 at [facility]  Inpatient Admissions# ProvidersLast Inpatient Admission  29Behavioral Pbchcp180/11/2023 at [facility] for 14 days  2Hwaiewj41/30/2023 at [facility]    Diagnoses - Behavioral Health (5)  Most Recent: Adjustment Disorder • Schizoaffective Disorder • Schizophrenia • Unspecified/Other Psychotic Disorders • Other Mental Disorders  Most Frequent (# of services): Schizophrenia (13) • Unspecified/Other Psychotic Disorders (3) • Schizoaffective Disorder (2) • Adjustment Disorder (2) • Other Mental Disorders (1)

## 2023-10-06 NOTE — ED PROVIDER NOTE - NS ED ATTENDING STATEMENT MOD
This was a shared visit with the STEFANIA. I reviewed and verified the documentation and independently performed the documented:

## 2023-10-06 NOTE — ED BEHAVIORAL HEALTH ASSESSMENT NOTE - DETAILS
see HPI h/o alcohol use disorder in family per prior chart abdominal pain, vomiting commands to end life without reported intent or plan. Does not appear internally preoccupied [employer] [place] per prior chart pt declined. Emergency options/return precautions reviewed with pt self

## 2023-10-06 NOTE — ED PROVIDER NOTE - PHYSICAL EXAMINATION
CONSTITUTIONAL: unkempt   HEAD: Normocephalic; atraumatic.   EYES: PERRL; EOM intact; conjunctiva and sclera clear  ENT: normal nose; no rhinorrhea; normal pharynx with no erythema or lesions.   NECK: Supple; non-tender; no LAD  CARDIOVASCULAR: Normal S1, S2; No audible murmurs. Regular rate and rhythm.   RESPIRATORY: Breathing easily; breath sounds clear and equal bilaterally; no wheezes, rhonchi, or rales.  GI: Soft; non-distended; non-tender; no palpable organomegaly.   MSK: FROM at all extremities, normal tone   EXT: No cyanosis or edema; N/V intact  SKIN: Normal for age and race; warm; dry; good turgor; no apparent lesions or rash.   NEURO: A & O x 3; face symmetric; grossly unremarkable.   PSYCHOLOGICAL: The patient’s mood and manner are appropriate.

## 2023-10-06 NOTE — ED BEHAVIORAL HEALTH ASSESSMENT NOTE - FAMILY HISTORY OF PSYCHIATRIC ILLNESS / SUICIDALITY
Vimal Palomo   is a  64   year old   male  who is seen here today for a first visit. He is seen in consultation for  Sesar Cottrell  .   pt with positive HCV ab FEW MONTHS AGO 
Yes

## 2023-10-06 NOTE — ED BEHAVIORAL HEALTH ASSESSMENT NOTE - DESCRIPTION
Pt BIB EMS for abdominal pain, calm and cooperative. Pt BIB EMS for abdominal pain, calm and cooperative. No reported SI in triage but endorsed to RN and PA in ED. Bloodwork pending. undomiciled, unemployed, no reported collateral contacts HCV, hypothyroidism, seizure d/o per chart

## 2023-10-06 NOTE — ED BEHAVIORAL HEALTH ASSESSMENT NOTE - RISK ASSESSMENT
Assessed at low acute suicide and violence risk. Despite chronically reported command hallucinations to end his life, pt denies any suicidal intent or plan, is forward-thinking and treatment seeking, fearful of death, hopeful for care for his abdominal pain and for medications to assist with his chronic experience of hallucinations and depression. He reports access to mental health care at the [facility] and knowledge of other mental health resources as needed.  Static risk factors include h/o psychiatric dx, possible h/o neurocognitive d/o, h/o reported suicidality, advanced age, male gender  Modifiable risk factors include acute abdominal pain, suspected recent medication nonadherence, possible recent substance use, socioeconomic stressors

## 2023-10-06 NOTE — ED ADULT NURSE NOTE - OBJECTIVE STATEMENT
63M BIBA c/o abd pain, weakness and n/v. pt with notable psychiatric hx. +auditory hallucinations x1 week. reports "the voices are telling me to jump in front of a train." pt denies HI or ETOH/drug use. pt reports non-compliance with psych meds. pt changed into yellow hospital gown and red socks. 3 bags of belongings collected, placed in front cabinet and pt wanded by security. 1:1 initiated. Environment checked for all ligature risks and safety hazards utilizing environmental safety checklist.

## 2023-10-06 NOTE — ED BEHAVIORAL HEALTH ASSESSMENT NOTE - DIFFERENTIAL
alcohol use disorder, neurocognitive disorder, psychotic disorder alcohol use disorder, neurocognitive disorder, psychotic disorder, r/o delirium, r/o malingering alcohol use disorder, suspected neurocognitive disorder, psychotic disorder by history, cocaine use d/o by history, r/o delirium, r/o malingering

## 2023-10-06 NOTE — ED BEHAVIORAL HEALTH ASSESSMENT NOTE - OTHER PAST PSYCHIATRIC HISTORY (INCLUDE DETAILS REGARDING ONSET, COURSE OF ILLNESS, INPATIENT/OUTPATIENT TREATMENT)
Past listed diagnoses including schizoaffective d/o, schizophrenia, adjustment d/o, neurocognitive d/o, alcohol use disorder, cocaine use disorder  Many past [facility], last in [facility] in July 2023 to [facility].   Unclear if currently in OP treatment at the [facility].

## 2023-10-06 NOTE — ED BEHAVIORAL HEALTH ASSESSMENT NOTE - SUMMARY
RECOMMENDATIONS  -pt is psychiatrically stable for discharge  -consider offering quetiapine 100mg for psychotic sx per pt request pending OP assessment  -recommend continued OP care at [facility]  -pt declines counseling about alcohol use RECOMMENDATIONS  -pt is psychiatrically stable for discharge  -consider offering quetiapine 100mg for psychotic sx per pt request pending OP assessment  -recommend continued OP care at [facility]  -pt declines counseling about alcohol use  -unable to reach [facility] to coordinate care 62yo man, undomiciled, unemployed, [employer] , in outpt care at the [facility], with a history of alcohol, cocaine use disorders, possible psychotic disorder, prior concern for neurocognitive impairment/TBI, significant documented past concern for malingered symptoms to obtain shelter/hospital admission, chronic documented CAH/SI without known h/o suicide attempts, hypothyroidism, HCV, ?seizure d/o, who presents for evaluation of abdominal pain, also endorsing chronic CAH to end his life without any intent or plan.     Pt presents as calm, irritable, intellectually limited, possible neurocognitive d/o r/o delirium, with perseveration in rote manner on his command hallucinations and vague depressive symptoms consistent with prior evaluations by writer, no desire to end life, notably future oriented and treatment seeking for his abdominal pain. No concern for decompensated psychosis or mood d/o - this presentation appears to be pt's psychiatric baseline with exception of possible increased confusion (ie disorientation to own age and date) that may be attributable to delirium, possibly related to abdominal complaints. Pt is not fully cooperative with psychiatric evaluation, but based on available history and exam, pt is assessed at low acute risk of harm to self and others and there is no current indication for psychiatric admission.     RECOMMENDATIONS  -pt is psychiatrically stable for discharge  -f/u BAL, utox, TSH. Monitor for EtOH withdrawal if remains in hospital  -consider offering quetiapine 100mg for psychotic sx per pt request pending OP assessment  -consider offering trazodone 100mg QHS for insomnia if remains in hospital  -recommend continued OP care at [facility]  -pt declines counseling about alcohol use  -delirium precautions and continued medical w/u as per ED   -unable to reach [facility] to coordinate care  d/w ED PA

## 2023-10-06 NOTE — ED PROVIDER NOTE - OBJECTIVE STATEMENT
64 yo m with pmh of etoh/drug abuse, ?psyc hx of seroquel c/o hearing voices telling him to kill himself. plan to jump in front of train. Also c/o generalized abd pain for the past few days. States he vomited today. Denies fever, chills, cp, sob, diarrhea, VH, HI. Denies drug or alcohol use. Lives on street.

## 2023-10-06 NOTE — ED BEHAVIORAL HEALTH ASSESSMENT NOTE - TIME CONSULT PERFORMED
Labs today  Make appointment to see sleep specialist  Make appointment to see pulmonary specialist Dr.kathryn Acevedo  [facility]: [facility] [phone]     Keep appointment with me on 6/5/19  Seek sooner medical attention if there is any worsening of symptoms or problems.     06-Oct-2023 13:45

## 2023-10-06 NOTE — ED PROVIDER NOTE - PATIENT PORTAL LINK FT
You can access the FollowMyHealth Patient Portal offered by [facility] by registering at the following website: [email or web address]. By joining [employer]’s FollowMyHealth portal, you will also be able to view your health information using other applications (apps) compatible with our system.

## 2023-10-09 DIAGNOSIS — R44.0 AUDITORY HALLUCINATIONS: ICD-10-CM

## 2023-10-09 DIAGNOSIS — D72.829 ELEVATED WHITE BLOOD CELL COUNT, UNSPECIFIED: ICD-10-CM

## 2023-10-09 DIAGNOSIS — Z56.0 UNEMPLOYMENT, UNSPECIFIED: ICD-10-CM

## 2023-10-09 DIAGNOSIS — E03.9 HYPOTHYROIDISM, UNSPECIFIED: ICD-10-CM

## 2023-10-09 DIAGNOSIS — G40.909 EPILEPSY, UNSPECIFIED, NOT INTRACTABLE, WITHOUT STATUS EPILEPTICUS: ICD-10-CM

## 2023-10-09 DIAGNOSIS — N28.9 DISORDER OF KIDNEY AND URETER, UNSPECIFIED: ICD-10-CM

## 2023-10-09 DIAGNOSIS — Z81.1 FAMILY HISTORY OF ALCOHOL ABUSE AND DEPENDENCE: ICD-10-CM

## 2023-10-09 DIAGNOSIS — R10.84 GENERALIZED ABDOMINAL PAIN: ICD-10-CM

## 2023-10-09 DIAGNOSIS — F29 UNSPECIFIED PSYCHOSIS NOT DUE TO A SUBSTANCE OR KNOWN PHYSIOLOGICAL CONDITION: ICD-10-CM

## 2023-10-09 DIAGNOSIS — Z59.02 UNSHELTERED HOMELESSNESS: ICD-10-CM

## 2023-10-09 DIAGNOSIS — F10.90 ALCOHOL USE, UNSPECIFIED, UNCOMPLICATED: ICD-10-CM

## 2023-10-09 DIAGNOSIS — Z86.19 PERSONAL HISTORY OF OTHER INFECTIOUS AND PARASITIC DISEASES: ICD-10-CM

## 2023-10-09 DIAGNOSIS — F25.9 SCHIZOAFFECTIVE DISORDER, UNSPECIFIED: ICD-10-CM

## 2023-10-09 DIAGNOSIS — F32.A DEPRESSION, UNSPECIFIED: ICD-10-CM

## 2023-10-09 DIAGNOSIS — R11.10 VOMITING, UNSPECIFIED: ICD-10-CM

## 2023-10-09 SDOH — ECONOMIC STABILITY - HOUSING INSECURITY: UNSHELTERED HOMELESSNESS: Z59.02

## 2023-10-09 SDOH — ECONOMIC STABILITY - INCOME SECURITY: UNEMPLOYMENT, UNSPECIFIED: Z56.0

## 2023-10-11 LAB
CULTURE RESULTS: SIGNIFICANT CHANGE UP
CULTURE RESULTS: SIGNIFICANT CHANGE UP
SPECIMEN SOURCE: SIGNIFICANT CHANGE UP
SPECIMEN SOURCE: SIGNIFICANT CHANGE UP

## 2023-10-19 NOTE — ED BEHAVIORAL HEALTH ASSESSMENT NOTE - NS ED BHA SUICIDALITY PRESENT CURRENT PASSIVE IDEATION
Propranolol Counseling:  I discussed with the patient the risks of propranolol including but not limited to low heart rate, low blood pressure, low blood sugar, restlessness and increased cold sensitivity. They should call the office if they experience any of these side effects. Yes

## 2023-11-03 NOTE — BH PATIENT PROFILE - FALL HARM RISK - FALL HARM RISK
Bed/Stretcher in lowest position, wheels locked, appropriate side rails in place/Call bell, personal items and telephone in reach/Instruct patient to call for assistance before getting out of bed/chair/stretcher/Non-slip footwear applied when patient is off stretcher/[place] to call system/Physically safe environment - no spills, clutter or unnecessary equipment/Purposeful proactive rounding/Room/bathroom lighting operational, light cord in reach
No indicators present

## 2023-11-25 NOTE — ED PROVIDER NOTE - NSFOLLOWUPINSTRUCTIONS_ED_ALL_ED_FT
Onset: 11/22    Location/description:. Started a couple days ago with sneezing and the next day was a sore throat and productive cough.Denies sore throat today. Today very nauseous/ upset stomach, loose stools, not vomiting, and tired. Loss of appetite. Last Friday Covid vaccine and flu vaccine.     Associated Symptoms: see above    What improves/worsens symptoms: sleep makes it better, nothing makes it worse    Symptom specific medications: none    Intake and Output: did not eat dinner last night , drinking a little less than normal but still drinking. Loose stool and urinating normally     Activity level: napping more, alert able to stay awake    Temperature (route and time): 97.9f forehead - yesterday    Weight:   Wt Readings from Last 1 Encounters:   03/17/23 23.1 kg (51 lb) (48 %, Z= -0.05)*     * Growth percentiles are based on CDC (Boys, 2-20 Years) data.          Recent Care: 3/17/23    PLAN:    Home Care Advice provided    Patient/Caller agrees to follow recommendations.    Reason for Disposition  • Unexplained nausea  • ALSO, mild cough is present    Protocols used: NAUSEA-P-AH, COLDS-P-AH       Refrain from drinking alcohol

## 2023-11-29 ENCOUNTER — EMERGENCY (EMERGENCY)
Facility: HOSPITAL | Age: 63
LOS: 1 days | Discharge: HOME | End: 2023-11-29
Attending: EMERGENCY MEDICINE | Admitting: EMERGENCY MEDICINE
Payer: MEDICARE

## 2023-11-29 VITALS
OXYGEN SATURATION: 94 % | DIASTOLIC BLOOD PRESSURE: 87 MMHG | RESPIRATION RATE: 18 BRPM | TEMPERATURE: 98 F | SYSTOLIC BLOOD PRESSURE: 131 MMHG | HEART RATE: 97 BPM

## 2023-11-29 DIAGNOSIS — F10.929 ALCOHOL USE, UNSPECIFIED WITH INTOXICATION, UNSPECIFIED: ICD-10-CM

## 2023-11-29 DIAGNOSIS — R41.82 ALTERED MENTAL STATUS, UNSPECIFIED: ICD-10-CM

## 2023-11-29 PROCEDURE — 99283 EMERGENCY DEPT VISIT LOW MDM: CPT

## 2023-11-29 NOTE — ED ADULT NURSE NOTE - CAS TRG GENERAL AIRWAY, MLM
PHYSICAL EXAM  Heart: S1,S2 regular rate and rhythm without murmur.  Lungs: Clear to auscultation bilaterally without wheezes or rhonchi.   Level of Consciousness/ Mental status: Alert, appropriate thought content, conversation, mood and affect.  Neuro: Grossly NORMAL findings  Vascular: Grossly NORMAL findings  AIRWAY ASSESSMENT: Mallampati Class II - Soft palate uvula fauces pillars visible. No difficulty.  
Patent

## 2023-11-29 NOTE — ED ADULT TRIAGE NOTE - CHIEF COMPLAINT QUOTE
pt. picked up from [facility] found with empty alcohol next to him, pt. endorses drinking alcohol tonight denies any injuries no visible injuries noted in triage.

## 2023-11-29 NOTE — ED ADULT NURSE NOTE - CHIEF COMPLAINT QUOTE
pt. picked up from [facility] found with empty alcohol next to him, pt. endorses drinking alcohol tonight denies any injuries no visible injuries noted in triage.

## 2023-11-29 NOTE — ED ADULT NURSE NOTE - NSFALLHARMRISKINTERV_ED_ALL_ED
Assistance OOB with selected safe patient handling equipment if applicable/Assistance with ambulation/Communicate risk of Fall with Harm to all staff, patient, and family/Monitor gait and stability/Monitor for mental status changes and reorient to person, place, and time, as needed/Provide visual cue: red socks, yellow wristband, yellow gown, etc/Reinforce activity limits and safety measures with patient and family/Toileting schedule using arm’s reach rule for commode and bathroom/Use of alarms - bed, stretcher, chair and/or video monitoring/Bed in lowest position, wheels locked, appropriate side rails in place/Call bell, personal items and telephone in reach/Instruct patient to call for assistance before getting out of bed/chair/stretcher/Non-slip footwear applied when patient is off stretcher/[place] to call system/Physically safe environment - no spills, clutter or unnecessary equipment/Purposeful Proactive Rounding/Room/bathroom lighting operational, light cord in reach

## 2023-11-29 NOTE — ED PROVIDER NOTE - PATIENT PORTAL LINK FT
You can access the FollowMyHealth Patient Portal offered by [facility] by registering at the following website: [email or web address]. By joining [employer]’s FollowMyHealth portal, you will also be able to view your health information using other applications (apps) compatible with our system.

## 2023-12-06 NOTE — ED BEHAVIORAL HEALTH ASSESSMENT NOTE - THOUGHT CONTENT
Care Due:                  Date            Visit Type   Department     Provider  --------------------------------------------------------------------------------                                DANIEL      [facility]                              FOLLOWUP/OF  MED/ INTERNAL  Last Visit: 11-      FICE VISIT   MED/ PEDS      JERALD GALARZA                               -         [facility]                              PRIMARY      MED/ INTERNAL  Next Visit: 12-      CARE ([facility])   MED/ PEDS      JERALD GALARZA                                                            Last  Test          Frequency    Reason                     Performed    Due Date  --------------------------------------------------------------------------------    Cr..........  12 months..  venlafaxine..............  Not Found    Overdue    [employer] Embedded Care Due Messages. Reference number: [id number].   12/06/2023 11:35:03 AM CST   Suicidality

## 2023-12-30 NOTE — ED ADULT NURSE NOTE - NS_SISCREENINGSR_GEN_ALL_ED
[facility]: ACUTE REHAB UNIT  SPEECH-LANGUAGE PATHOLOGY      [x] Daily  [] Weekly Care Conference Note  [] Discharge    Patient:Isac Zhu      CFQ:[birth year]  IF:[id number]  Rehab Dx/Hx: SDH (subdural hematoma) ([address]) [S06. 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; independent with household tasks and medication/financial management; family nearby  [address] Dx: [x] Aphasia  [x] Dysarthria  [] Apraxia   [x] Oropharyngeal dysphagia [x] Cognitive Impairment  [] Other:   Date of Admit: 12/22/2023  Room #: 0156/0156-01    Current functional status (updated daily):       +    Pt being seen for : [x] Speech/Language Treatment  [x] Dysphagia Treatment [] Cognitive Treatment  [] Other:  Communication: []WFL  [x] Aphasia  [x] Dysarthria  [] Apraxia  [] Pragmatic Impairment [] Non-verbal  [] Hearing Loss  [] Other:   Cognition: [] WFL  [] Mild  [] Moderate  [] Severe [x] Unable to Assess  [x] Other: suspect deficit  Memory: [] WFL  [] Mild  [] Moderate  [] Severe [x] Unable to Assess  [] Other:  Behavior: [] Alert  [] Cooperative  [x]  Pleasant  [] Confused  [] Agitated  [] Uncooperative  [x] Distractible [] Motivated  [] Self-Limiting [] Anxious  [] Other:  Endurance:  [] Adequate for participation in SLP sessions  [x] Reduced overall  [x] Lethargic  [] Other:  Safety: [] No concerns at this time  [] Reduced insight into deficits  []  Reduced safety awareness [] Not following call light procedures  [x] Unable to Assess  [] Other:    Current Diet Order:Diet NPO  ADULT TUBE FEEDING; PEG; Diabetic; Continuous; 25; Yes; 25; Q 4 hours; 60; 60; Q 4 hours  Swallowing Precautions: Reflux and aspiration precautions; Frequent oral hygiene; allow small volume ice chips with SLP/RN only, hold PO and contact SLP if s/s aspiration or worsening respiratory status develop        Date: 12/30/2023      Tx session 1  [id number] Tx session recognizable \"no\" response     Other areas targeted: N/a N/a   Education:   SLP edu pt re: importance of oral care SLP edu pt family re: continued clinical swallow therapy prior to reattempting MBS   Safety Devices: [x] Call light within reach  [] Chair alarm activated  [x] Bed alarm activated  [x] Other: wife at bedside [x] Call light within reach  [x] Chair alarm activated  [] Bed alarm activated  [x] Other: spouse and visitor at bedside   Assessment: Tx session 1:   Pt lethargic, but improved alertness across the session. He consistently made eye contact with spouse on his Right side and with SLP on his Left side when verbally cued. No verbal or vocal output elicited. He did not imitate body commands or provide yes or no response via any modality. While he orally accepted ice chips and masticated 1/4, no palpable swallow was elicited.    Tx session 2:   Marked improvement in spontaneous and imitated verbal attempts, oral acceptance of ice chips, and swallow initiation compared to am session. Pt remains at high risk for silent aspiration given prolonged NPO status and absent swallows in morning session. Recommend continued dysphagia therapy before repeating MBS attempt.  Pt spontaneously verbalized 1 phrase and 1 word. He imitated 1 phrase and 1 word. He imitated volitional throat clearing X2 and imitated waving to SLP on departure. He turned his head Right to locate his spouse across the room X4.   Plan: Continue as per plan of care.      Additional Information:     Barriers toward progress: Pain, Confusion, Communication deficit, Decreased endurance, and Medical complications  Discharge recommendations:  [] Home independently  [] Home with assistance [x]  24 hour supervision  [] ECF [] Other:  Continued Tx Upon Discharge: ? [x] Yes [] No [] TBD based on progress while on ARU [] Vital Stim indicated [] Other:   Estimated discharge date: 01/14/24    Interventions used this date:  [x] Speech/Language Treatment  []  Positive

## 2023-12-31 ENCOUNTER — EMERGENCY (EMERGENCY)
Facility: HOSPITAL | Age: 63
LOS: 1 days | Discharge: HOME | End: 2023-12-31
Attending: EMERGENCY MEDICINE | Admitting: EMERGENCY MEDICINE
Payer: MEDICARE

## 2023-12-31 VITALS
SYSTOLIC BLOOD PRESSURE: 175 MMHG | OXYGEN SATURATION: 100 % | TEMPERATURE: 98 F | HEART RATE: 95 BPM | HEIGHT: 67 IN | DIASTOLIC BLOOD PRESSURE: 93 MMHG | WEIGHT: 199.96 LBS | RESPIRATION RATE: 20 BRPM

## 2023-12-31 LAB
GLUCOSE BLDC GLUCOMTR-MCNC: 129 MG/DL — HIGH (ref 70–99)
GLUCOSE BLDC GLUCOMTR-MCNC: 129 MG/DL — HIGH (ref 70–99)

## 2023-12-31 PROCEDURE — 99284 EMERGENCY DEPT VISIT MOD MDM: CPT

## 2023-12-31 RX ORDER — IBUPROFEN 200 MG
600 TABLET ORAL ONCE
Refills: 0 | Status: COMPLETED | OUTPATIENT
Start: 2023-12-31 | End: 2023-12-31

## 2023-12-31 RX ORDER — ACETAMINOPHEN 500 MG
650 TABLET ORAL ONCE
Refills: 0 | Status: COMPLETED | OUTPATIENT
Start: 2023-12-31 | End: 2023-12-31

## 2023-12-31 RX ADMIN — Medication 600 MILLIGRAM(S): at 22:46

## 2023-12-31 RX ADMIN — Medication 650 MILLIGRAM(S): at 22:45

## 2023-12-31 NOTE — ED PROVIDER NOTE - CLINICAL SUMMARY MEDICAL DECISION MAKING FREE TEXT BOX
63M  biba for suspected intox, however, when interviewing patient he states that he is here for his low back pain and pain in both feet. The pain is chronic to both areas and worse bc patient is homeless and does not have good shoes. Pain is mild-moderate, constant, better with rest and worse with ambulation. Denies trauma/falls. Reports etoh use tonight.    PE: A&Ox3, well appearing unkempt, NAD, NCAT, regular respiratory effort, moving all four extremities equally, no midline spinal tenderness to palpation, +paraspinal muscle ttp.    MDM: Patient with chronic lower back pain and feet pain. When questioned further patient reports being here for shelter and food, primarily. Will allow to stay a few hours, give food and pain meds and then dc.

## 2023-12-31 NOTE — ED ADULT TRIAGE NOTE - NS ED NURSE BANDS TYPE
S/W [employer]. Fax sent 3/8/23- per [employer], it takes 3-5 business days for paperwork to be filtered and processed.     S/W [place], advised of above. Recommended she call [employer] on 3/16/23 if not contacted sooner.     Pt reports having about 3 weeks of samples left.      Name band;

## 2023-12-31 NOTE — ED PROVIDER NOTE - PATIENT PORTAL LINK FT
You can access the FollowMyHealth Patient Portal offered by [facility] by registering at the following website: [email or web address]. By joining [employer]’s FollowMyHealth portal, you will also be able to view your health information using other applications (apps) compatible with our system. You can access the FollowMyHealth Patient Portal offered by [facility] by registering at the following website: [email or web address]. By joining [employer]’s FollowMyHealth portal, you will also be able to view your health information using other applications (apps) compatible with our system.

## 2023-12-31 NOTE — ED ADULT TRIAGE NOTE - ARRIVAL FROM
PRE-SEDATION ASSESSMENT    CONSENT  Consent for procedure and sedation obtained: Yes    MEDICAL HISTORY  Significant medical/surgical history: No  Past Complications with Sedation/Anesthesia: No  Significant Family History: No  Smoking History: No  Alcohol/Drug abuse: No  Possible Pregnancy (LMP): No  Cardiac History: No  Respiratory History: No    PHYSICAL EXAM  History and Physical Reviewed: Patient has valid H&P within 30 days. I have reviewed and there are no changes.  Airway Risk History: No previous complications  Airway Anatomy : Class II  Heart : Normal  Lungs : Normal  LOC/Mental Status : Normal    OTHER FINDINGS  Reviewed current medications and allergies: Yes  Pertinent lab/diagnostic test reviewed: Yes    SEDATION RISK ASSESSMENT  Risk Status ASA: Class II - Normal patient with mild systemic disease  Plan for Sedation: Moderate Sedation  Indications for Procedure/Pre-Procedure Diagnosis and Planned Procedure: colonoscopy - screening  EKG Monitoring: Yes    NARRATIVE FINDINGS      Street

## 2023-12-31 NOTE — ED ADULT NURSE NOTE - OBJECTIVE STATEMENT
63y male presents to ED c/o AMS. Admits to ETOH use. Also endorsing bilateral feet and bilateral leg pain.

## 2023-12-31 NOTE — ED ADULT NURSE NOTE - NSFALLRISKINTERV_ED_ALL_ED
Assistance OOB with selected safe patient handling equipment if applicable/Assistance with ambulation/Communicate fall risk and risk factors to all staff, patient, and family/Monitor gait and stability/Monitor for mental status changes and reorient to person, place, and time, as needed/Provide visual cue: yellow wristband, yellow gown, etc/Reinforce activity limits and safety measures with patient and family/Toileting schedule using arm’s reach rule for commode and bathroom/Use of alarms - bed, stretcher, chair and/or video monitoring/Call bell, personal items and telephone in reach/Instruct patient to call for assistance before getting out of bed/chair/stretcher/Non-slip footwear applied when patient is off stretcher/[place] to call system/Physically safe environment - no spills, clutter or unnecessary equipment/Purposeful Proactive Rounding/Room/bathroom lighting operational, light cord in reach Assistance OOB with selected safe patient handling equipment if applicable/Assistance with ambulation/Communicate fall risk and risk factors to all staff, patient, and family/Monitor gait and stability/Monitor for mental status changes and reorient to person, place, and time, as needed/Provide visual cue: yellow wristband, yellow gown, etc/Reinforce activity limits and safety measures with patient and family/Toileting schedule using arm’s reach rule for commode and bathroom/Use of alarms - bed, stretcher, chair and/or video monitoring/Call bell, personal items and telephone in reach/Instruct patient to call for assistance before getting out of bed/chair/stretcher/Non-slip footwear applied when patient is off stretcher/[place] to call system/Physically safe environment - no spills, clutter or unnecessary equipment/Purposeful Proactive Rounding/Room/bathroom lighting operational, light cord in reach

## 2023-12-31 NOTE — ED ADULT TRIAGE NOTE - CHIEF COMPLAINT QUOTE
BIBA [employer] 01H. Pt admits to drinking x1 pint of vodka PTA. Pt denies any further at triage. No obvious injuries observed or reported.

## 2023-12-31 NOTE — ED PROVIDER NOTE - PHYSICAL EXAMINATION
PE: A&Ox3, well appearing unkempt, NAD, NCAT, regular respiratory effort, moving all four extremities equally, no midline spinal tenderness to palpation, +paraspinal muscle ttp.

## 2023-12-31 NOTE — ED PROVIDER NOTE - NSFOLLOWUPINSTRUCTIONS_ED_ALL_ED_FT
PLEASE FOLLOW-UP WITH YOUR PRIMARY CARE DOCTOR IN 1-2 DAYS FOR FURTHER EVALUATION.      PLEASE TAKE ALL PAPERWORK FROM TODAY'S VISIT TO YOUR PRIMARY DOCTOR.     IF YOU DO NOT HAVE A PRIMARY CARE DOCTOR PLEASE REFER TO THE OFFICE/CLINIC INFORMATION GIVEN BELOW:    If you do not have a doctor, you can call our referral line to find a doctor that matches your insurance; the number is [phone].     You can also follow up with clinics listed below, if you do not have a doctor:  [address] [address]  To make an appointment, call [phone]    [facility]  Address: [address] [place] [address]  Appointment Center: [phone] ([phone])     PLEASE RETURN TO THE ER IMMEDIATELY OR CALL 111 ANY HIGH FEVER, CHEST PAIN, TROUBLE BREATHING, VOMITING, SEVERE PAIN, OR ANY OTHER CONCERNS.    To access your record on the patient portal [facility], please visit:  [email or web address]  If you are having difficulties setting this up, call [phone] and someone can assist you over the phone. PLEASE FOLLOW-UP WITH YOUR PRIMARY CARE DOCTOR IN 1-2 DAYS FOR FURTHER EVALUATION.      PLEASE TAKE ALL PAPERWORK FROM TODAY'S VISIT TO YOUR PRIMARY DOCTOR.     IF YOU DO NOT HAVE A PRIMARY CARE DOCTOR PLEASE REFER TO THE OFFICE/CLINIC INFORMATION GIVEN BELOW:    If you do not have a doctor, you can call our referral line to find a doctor that matches your insurance; the number is [phone].     You can also follow up with clinics listed below, if you do not have a doctor:  [address] [address]  To make an appointment, call [phone]    [facility]  Address: [address] [place] [address]  Appointment Center: [phone] ([phone])     PLEASE RETURN TO THE ER IMMEDIATELY OR CALL [phone] ANY HIGH FEVER, CHEST PAIN, TROUBLE BREATHING, VOMITING, SEVERE PAIN, OR ANY OTHER CONCERNS.    To access your record on the patient portal [facility], please visit:  [email or web address]  If you are having difficulties setting this up, call [phone] and someone can assist you over the phone.

## 2023-12-31 NOTE — ED PROVIDER NOTE - OBJECTIVE STATEMENT
63M  biba for suspected intox, however, when interviewing patient he states that he is here for his low back pain and pain in both feet. The pain is chronic to both areas and worse bc patient is homeless and does not have good shoes. Pain is mild-moderate, constant, better with rest and worse with ambulation. Denies trauma/falls. Reports etoh use tonight.

## 2024-01-02 DIAGNOSIS — R41.82 ALTERED MENTAL STATUS, UNSPECIFIED: ICD-10-CM

## 2024-01-02 DIAGNOSIS — M79.672 PAIN IN LEFT FOOT: ICD-10-CM

## 2024-01-02 DIAGNOSIS — M54.50 LOW BACK PAIN, UNSPECIFIED: ICD-10-CM

## 2024-01-02 DIAGNOSIS — M79.671 PAIN IN RIGHT FOOT: ICD-10-CM

## 2024-01-02 DIAGNOSIS — Z59.00 HOMELESSNESS UNSPECIFIED: ICD-10-CM

## 2024-01-02 DIAGNOSIS — G89.29 OTHER CHRONIC PAIN: ICD-10-CM

## 2024-01-02 SDOH — ECONOMIC STABILITY - HOUSING INSECURITY: HOMELESSNESS UNSPECIFIED: Z59.00

## 2024-01-08 NOTE — ED BEHAVIORAL HEALTH ASSESSMENT NOTE - SELF INJURIOUS BEHAVIOR WITHOUT SUICIDAL INTENT:
Subjective   Patient ID: Catherine Cano is a 54 y.o. female who presents for shoulder pain  HPI   Left shoulder dull pain got worse at 9/10 in the past 1 week. There was no  shoulder swelling. No local skin erythema or warmth. Raising left arm increased the pain. Chronic low back dull pain was at 8/10. the pain was localized to low back.  pt denies LE weakness or numbness. pt controlled BM and bladder well. pt stated that walking or bending  over increased the pain. no imbalance or falls.     Review of Systems    Objective   /76     Physical Exam  Mild  distress, well groomed, No sclera icterus. normal respiration, lungs: CTA b/l, heart: RRR, no LE  edema, normal pedal pulses, abd: soft, no tenderness, BS+, mild low back and left shoulder tenderness with decreased rom at left shoulder.  DTR were normal at the knees, SLR test was negative, normal strength and sensation at extremities, good balance, No depressed mood.    Assessment/Plan   Chronic low back pain which persisted. cold compress, stretch exercise.  start ketorolac.  caution of SE from ketorolac such as kidney damage and stomach bleeding. take ketorolac with foods. call office if symptoms do not improve in 2 weeks or if pain gets worse. Pt declined to see a pain specialist.   Worse left shoulder pain,  Pt requested knee cortisone injection for pain relief.  The risks of cortisone injection such as elevated blood glucose, joint or skin infection, hypopigmentation, subQ fatty atrophy were discussed with patient. Patient agreed to the procedure. Under sterile condition, the posterior aspect of the left shoulder was anesthetized by 1 ml  of 1% lidocaine, The  joint was injected via medial aspect with 40mg kenolog  in 3ml of 1% lidocaine. Pt tolerated the procedure well. No strenuous activities in the next 3-4 days. Call office if pain recurs.   Dc smoking.             Yes > 3 months ago I personally performed the service described in the documentation recorded by the scribe in my presence, and it accurately and completely records my words and actions.

## 2024-01-16 NOTE — ED PROVIDER NOTE - PRIOR EKG STATUS
there is no prior EKG available for comparison Quality 226: Preventive Care And Screening: Tobacco Use: Screening And Cessation Intervention: Patient screened for tobacco use and is an ex/non-smoker Detail Level: Detailed Quality 130: Documentation Of Current Medications In The Medical Record: Current Medications Documented

## 2024-02-01 NOTE — ED PROVIDER NOTE - NS ED MD DISPO DISCHARGE
Betamethasone Sodium Phosphate, Betamethasone Acetate And Celestone (J-Code: ) Price: 0.00 Include Pathology Charges?: No Anticipated Depth And Size Of Soft Tissue Excision (Required): Subcutaneous, Less than 1.5 cm Fee Schedule Discount In % Off Of Fee Schedule: Standard Fee Schedule as Entered in Pricing Tab J Code Units: 1 Anticipated Wound Length (Required): 2.5 cm or less Anticipated Excised Diameter (Required): 1.1 - 2.0 cm How Many Lesions Are You Destroying?: Less than 15 First Biopsy Type: Tangential Biopsy Anticipated Depth And Size Of Soft Tissue Excision (Required): Subcutaneous, Less than 3 cm Anticipated Depth And Size Of Soft Tissue Excision (Required): Subcutaneous, Less than 2 cm Number Of Lesions Injected: Less than 8 lesions Detail Level: None Repair: Intermediate Primary Closure Which Photosensitizer Was Used?: Levulan First Procedure You Would Like A Quote For?: Benign Excision Location (Required): Scalp Fee Schedule Discount For Cash Pay Patients In % Off Of Fee Schedule: 0 Home

## 2024-03-05 NOTE — ED CDU PROVIDER INITIAL DAY NOTE - MEDICAL DECISION MAKING DETAILS
Will obs for neuro checks, cardiac monitoring after sedation, to follow on clinical sobriety. (M6) obeys commands

## 2024-03-11 NOTE — ED BEHAVIORAL HEALTH ASSESSMENT NOTE - CURRENT INTENT
Cr 1.62 on admission. Baseline 1.1-1.2. Most likely prerenal etiology given volume status and anemia.  - Urine lytes   - Trend Cr, Avoid nephrotoxic meds   - encourage PO intake  - Strict I/O's and monitoring of ostomy output.
No

## 2024-04-02 NOTE — ED ADULT NURSE NOTE - CAS EDP DISCH TYPE
GARETT finalized: 6/22/24 per LMP, 11-week US and ACOG     Optional testing NIPS,CF/SMA,AFP:  NIPS neg, AFP neg     COVID: recommended  Flu: recommended  Tdap: 27-36 weeks  RSV:     Rhogam:  28-32 weeks repeat TPA:  ? Desires Sterilization:     Anatomy US: 2/6/24 normal anatomy, anterior placenta, EFW 49.5%, AC 59.9%  FU US:     PROBLEM LIST/PLAN:   Chlamydia/trich - positive on 12/4/23, negative on 1/8/24      
Home

## 2024-04-09 NOTE — BH TREATMENT PLAN - NSTXPROBCOPE_PSY_ALL_CORE
Fever on Sunday, and again today. Tmax 103.6.  Vomited in parking lot, green/yellow mixture per dad.   US scheduled for 4/11 for abdomen, spleen and gallbladder.   Per dad pt +gallstones on February 14.   Motrin given 0600  
COPING, INEFFECTIVE
COPING, INEFFECTIVE

## 2024-05-02 NOTE — ED PROVIDER NOTE - CHIEF COMPLAINT
1230: Came in to assess HD cath. Sluggish return on both ports. Venous port not flushing well. Cathflo'ed HD cath both arterial and venous port per MD order.  1500: Aspirated Cathflo on both ports,with good return on red port. Venous port sluggish. Both ports flushed with NS, locked with heparin. Clamped and capped. Pt not stable to run HD in dialysis unit. HD will be done bedside this evening. Primary RN aware.     The patient is a 62y Male complaining of altered mental status.

## 2024-08-08 NOTE — BH TREATMENT PLAN - NSTXPROBALCDRG_PSY_ALL_CORE
The defibrillation pads were placed in the anterior/posterior position.
ALCOHOL OR DRUG WITHDRAWAL
ALCOHOL OR DRUG WITHDRAWAL

## 2024-08-16 NOTE — PSYCHIATRIC REHAB INITIAL EVALUATION - LIVES WITH
Physical Therapy Evaluation    Visit Type: Daily Treatment Note  Visit: 4  Referring Provider: Prasanth Jay MD  Medical Diagnosis (from order): R42 - Vertigo     Diagnosis Precautions: Use of portable O2 tank  3 surgeries in left eye and reports L eye visual loss, R eye decreased vision    SUBJECTIVE                                                                                                               Easier to get out of bed.      OBJECTIVE                                                                                                                               Vestibular Testing With Goggles   VOR = vestibular ocular reflex    Positional Exam  - Sidelying Test      Left: positive       - Nystagmus: apogeotropic       - Latency: 5 sec Duration: 60 sec      Right: positive       - Nystagmus: apogeotropic       - Latency: 5 sec  Duration: 30 sec  - Roll Test (horizontal canal)      Left: negative      Right: negative  - [place]/Lean Test: positive       Goggles bow lean test left details: right beating in bow; left beating in lean.  Greater symptoms reported left sided testing overall    *Min - mod assist with bed mobility during positional exam       Treatment     Neuromuscular Re-Education  Performed to re-educate movement and vestibular function in order to progress towards goal of bed mobility, transfers, gait, and tasks of daily living    -Re-assess Benign Paroxysmal Positional Vertigo (BPPV) positional tests  -Instructed on sleep and movement precautions    Canalith Repositioning    Casani Maneuver completed to address right posterior canal cupulolithiasis benign paroxysmal positional vertigo (BPPV):    - 2 minute hold in each position, performed 1 set, slow transition between positions with continuous observation for nystagmus or subjective reports of vertigo   - Following treatment, thorough patient education provided allowing for 5 minute rest in upright position  Skilled input: verbal  instruction/cues, tactile instruction/cues and posture correction    Writer verbally educated and received verbal consent for hand placement, positioning of patient, and techniques to be performed today from patient for clothing adjustments for techniques, hand placement and palpation for techniques and therapist position for techniques as described above and how they are pertinent to the patient's plan of care.  Home Exercise Program  sleep and movement precautions for 48 hours      ASSESSMENT                                                                                                            Continues with horizontal canal cupulolithiasis Benign Paroxysmal Positional Vertigo (BPPV). Treated with 1 set of Casani maneuver. Tolerated well with no dizziness reported post maneuver. Declined 2nd set of maneuver. Reviewed sleep and movement precautions for 48 hours. Reports understanding.  Education:   - Present and ready to learn: patient  - Results of above outlined education: Verbalizes understanding and Demonstrates understanding    PLAN                                                                                                                           Suggestions for next session as indicated: Progress per plan of care    -Reassess for Benign Paroxysmal Positional Vertigo & treat as indicated    Goals  Long Term Goals: to be met by end of plan of care  1. Patient will have dynamic motion of head and neck in sitting and standing to scan environment for safe ambulation and movement in multiple environments.  2. Patient will be able to move into/out of bed & roll over quickly x 2 weeks without dizziness or imbalance.  3. Independent with home exercise program       Therapy procedure time and total treatment time can be found documented on the Time Entry flowsheet   alone

## 2024-08-30 NOTE — ED PROVIDER NOTE - CLINICAL SUMMARY MEDICAL DECISION MAKING FREE TEXT BOX
63 yo M without medical complaints  Asking for food, given some  Dc to shelter  Discussed indications for patient return to ED. Patient understood.
98.2

## 2024-09-01 NOTE — ED PROVIDER NOTE - TOBACCO USE
Unknown if ever smoked If you are a smoker, it is important for your health to stop smoking. Please be aware that second hand smoke is also harmful.

## 2024-09-23 NOTE — ED BEHAVIORAL HEALTH ASSESSMENT NOTE - AFFECT QUALITY
Quality 431: Preventive Care And Screening: Unhealthy Alcohol Use - Screening: Patient not identified as an unhealthy alcohol user when screened for unhealthy alcohol use using a systematic screening method Quality 226: Preventive Care And Screening: Tobacco Use: Screening And Cessation Intervention: Patient screened for tobacco use and is an ex/non-smoker Detail Level: Zone Euthymic

## 2024-10-15 NOTE — ED ADULT NURSE NOTE - NSSUHOSCREENINGYN_ED_ALL_ED
[Normal Breath Sounds] : Normal breath sounds [Normal Heart Sounds] : normal heart sounds [No Rash or Lesion] : No rash or lesion [Alert] : alert [Oriented to Person] : oriented to person [Oriented to Place] : oriented to place [Oriented to Time] : oriented to time [Calm] : calm [de-identified] : WNL [de-identified] : WNL [de-identified] : DELFINL [de-identified] : WNL [de-identified] : Yes - the patient is able to be screened

## 2024-11-04 NOTE — ED ADULT TRIAGE NOTE - TEMPERATURE IN FAHRENHEIT (DEGREES F)
Discharge Summary/Summary of Care Provided: yes  Patient received home health for diagnosis: wound care and picc line managment, IV abx teaching  Current level of functional ability: ambulates with SBA and walker  Living arrangements: lives with mother in single story home  Progress towards goals and/or Were all goals met? all goals met  If not all goals met, barriers that prevented patient from meeting goals: NA  SDOH concerns (i.e. Caregiver availability, social isolation, environment, income, transportation access, food insecurity etc.)na  Follow-up appointment plans and community resources provided: na  Other imporant information. NA    patient was seen by plastic surgery on 11/1/24 who states patient no longer needs to do wound care to BLE, only moisturization. Patient asked to be discharged from home care services due to independence in caring for legs. Will continue seeing YOMI for PT 98.4

## 2025-02-03 NOTE — BH CONSULTATION LIAISON PROGRESS NOTE - NSBHMSEINTELL_PSY_A_CORE
REFERRING PHYSICIAN: JOHN RENDON    DATE OF CONSULTATION:  01/31/2025    CHIEF COMPLAINT:  Coronary artery disease.    HISTORY OF PRESENT ILLNESS:  This is a new consultation on this 84-year-old male patient of Dr. Rendon.  The patient has known low flow, low gradient severe aortic stenosis.  The patient was being worked up for TAVR.  The patient underwent angiography in preparation for the TAVR procedure.  Angiography revealed an ejection fraction of 40%.  There was severe aortic valve stenosis with a 30 mm gradient across the valve.  There was 70% stenosis in the midportion of the right coronary artery.  There was 50% stenosis of the midportion of the circumflex.  There was 90% stenosis in the proximal portion of the LAD and there was 99% stenosis at the ostium of the LAD leading into the left main.  We were asked to see him for potential CABG and surgical aortic valve replacement.    PAST MEDICAL HISTORY:    1. Atrial fibrillation.  2. Aortic stenosis.  3. Basal cell carcinoma of the skin.  4. Previous heart attack.  5. Colon polyps.  6. Hypertension.  7. Kidney stone.  8. Obstructive sleep apnea.  9. Organizing pneumonia.  10. Peptic ulcer disease.  11. Prostate cancer.    PAST SURGICAL HISTORY:  Significant for;  1. Right coronary artery stent.  2. Appendectomy.  3. Robotic right upper lobe and right lower lobe wedge resection in December of 2024.  4. Atrial fibrillation ablation x4.  5. Cardioversion x2.  6. Knee surgery.  7. Lumbar fusion.  8. Previous pacemaker.  9. Tonsillectomy.  10. Radical prostatectomy 30 years ago.  11. Shoulder surgery.    MEDICATIONS:  At home;  1. Prednisone.  2. Iron sulfate.  3. Norco.  4. Amiodarone.  5. Vitamin D.  6. Pravachol.  7. Ditropan.  8. Multiple vitamins.  9. Neurontin.  10. Cozaar.  11. Magnesium.  12. Toprol.  13. Protonix.  14. Xarelto.  15. Zostrix.    ALLERGIES:  None.    SOCIAL HISTORY:  The patient is a former smoker.  He no longer smokes.  He does not  drink alcohol.  The patient is on home arrest, which in the past has precluded him from being a candidate for rehab or nursing home placement.    FAMILY HISTORY:  Noncontributory.    REVIEW OF SYSTEMS:  General:  Negative for recent weight gain or loss or fever.  Eyes:  Negative for change in visual acuity.  Nose and Throat:  Negative for sinus congestion or drainage.  Respiratory:  Negative for respiratory difficulties, wheezing, or purulent expectoration.  Cardiac:  Positive for intermittent chest pain.  Positive for palpitations.  Abdomen:  Negative for nausea, vomiting, or blood in stool.  :  Negative for dysuria or hematuria.  Neuro:  Negative for focal weakness or headache.  Psychiatric:  Negative for hallucinations or behavioral abnormalities.  Hematologic:  Negative for easy bruising or bleeding.    PHYSICAL EXAMINATION:  CONSTITUTIONAL:  Well-nourished elderly male, in no acute distress.   HEAD:  Atraumatic, normocephalic.   EYES:  Sclerae nonicteric.   NOSE AND THROAT:  Mucous membranes were pink and moist.   NECK:  No JVD.   RESPIRATORY:  Normal respiratory effort.  No wheezing.   CARDIAC:  Regular rate and rhythm.  He does have a systolic murmur.   ABDOMEN:  Soft, nontender.  Positive bowel sounds.   MUSCULOSKELETAL:  No kyphoscoliosis.  He does have bilateral lower extremity peripheral edema.   SKIN:  Warm and dry.   NEUROLOGIC:  He is alert and oriented to person, place, and time.   PSYCHIATRIC:  Normal thought content, normal conversation, normal behavior.    IMPRESSION AND RECOMMENDATIONS:  I discussed the risks of aortic valve replacement and coronary artery bypass with the patient.  Because of his advanced age and at least a mild degree of frailty, I did explain to him that recovery would be difficult and that he in all great likelihood would benefit from inpatient rehabilitation.  I quoted with him a 2% to 3% operative mortality and a similar risk for stroke.  We also discussed the risk of  infection, bleeding, and transfusion risks.    PLAN:  We will ask Social Work to get involved.  If he is deemed to be a candidate for inpatient rehab services, then we will consider AVR CABG.  If he is not a candidate for inpatient rehab services, then I would recommend high-risk angioplasty to be followed in several weeks with TAVR.        Dictated By:  Sen Barlow MD        D:  02/03/2025 09:23:13  T:  02/03/2025 09:55:43  TVV/timmy  Job:  [id number]      cc:  JOHN WOODRUFF   Average

## 2025-05-26 NOTE — ED ADULT NURSE NOTE - NSHOSCREENINGQ1_ED_ALL_ED
No
PAST SURGICAL HISTORY:  H/O cystoscopy renal calculi    H/O lithotripsy ESWL    H/O nasal septoplasty     H/O varicose vein ligation     History of back surgery laminectomy lumbar    History of cataract surgery     History of ERCP with stent    History of laparoscopic cholecystectomy 5/2022    History of tonsillectomy

## 2025-05-28 NOTE — ED ADULT NURSE NOTE - IS THE PATIENT ABLE TO BE SCREENED?
Problem: Safety - Adult  Goal: Free from fall injury  5/28/2025 1346 by Kori Jimenez RN  Outcome: Adequate for Discharge  5/28/2025 1338 by Kori Jimenez RN  Outcome: Progressing  5/28/2025 0600 by Deana Cowan RN  Outcome: Progressing     Problem: Discharge Planning  Goal: Discharge to home or other facility with appropriate resources  5/28/2025 1346 by Kori Jimenez RN  Outcome: Adequate for Discharge  5/28/2025 1338 by Kori Jimenez RN  Outcome: Progressing  5/28/2025 0600 by Deana Cowan RN  Outcome: Progressing     Problem: Skin/Tissue Integrity  Goal: Skin integrity remains intact  Description: 1.  Monitor for areas of redness and/or skin breakdown2.  Assess vascular access sites hourly3.  Every 4-6 hours minimum:  Change oxygen saturation probe site4.  Every 4-6 hours:  If on nasal continuous positive airway pressure, respiratory therapy assess nares and determine need for appliance change or resting period  5/28/2025 1346 by Kori Jimenez RN  Outcome: Adequate for Discharge  5/28/2025 0600 by Deana Cowan RN  Outcome: Progressing     Problem: ABCDS Injury Assessment  Goal: Absence of physical injury  5/28/2025 1346 by Kori Jimenez RN  Outcome: Adequate for Discharge  5/28/2025 0600 by Deana Cowan RN  Outcome: Progressing     Problem: Pain  Goal: Verbalizes/displays adequate comfort level or baseline comfort level  5/28/2025 1346 by Kori Jimenez RN  Outcome: Adequate for Discharge  5/28/2025 0600 by Deana Cowan RN  Outcome: Progressing      Yes

## 2025-05-29 NOTE — ED PROVIDER NOTE - NSTIMEPROVIDERCAREINITIATE_GEN_ER
Tazorac Counseling:  Patient advised that medication is irritating and drying.  Patient may need to apply sparingly and wash off after an hour before eventually leaving it on overnight.  The patient verbalized understanding of the proper use and possible adverse effects of tazorac.  All of the patient's questions and concerns were addressed. Doxycycline Counseling:  Patient counseled regarding possible photosensitivity and increased risk for sunburn.  Patient instructed to avoid sunlight, if possible.  When exposed to sunlight, patients should wear protective clothing, sunglasses, and sunscreen.  The patient was instructed to call the office immediately if the following severe adverse effects occur:  hearing changes, easy bruising/bleeding, severe headache, or vision changes.  The patient verbalized understanding of the proper use and possible adverse effects of doxycycline.  All of the patient's questions and concerns were addressed. Sarecycline Pregnancy And Lactation Text: This medication is Pregnancy Category D and not consider safe during pregnancy. It is also excreted in breast milk. 12-May-2017 16:00 Dapsone Counseling: I discussed with the patient the risks of dapsone including but not limited to hemolytic anemia, agranulocytosis, rashes, methemoglobinemia, kidney failure, peripheral neuropathy, headaches, GI upset, and liver toxicity.  Patients who start dapsone require monitoring including baseline LFTs and weekly CBCs for the first month, then every month thereafter.  The patient verbalized understanding of the proper use and possible adverse effects of dapsone.  All of the patient's questions and concerns were addressed. Birth Control Pills Counseling: Birth Control Pill Counseling: I discussed with the patient the potential side effects of OCPs including but not limited to increased risk of stroke, heart attack, thrombophlebitis, deep venous thrombosis, hepatic adenomas, breast changes, GI upset, headaches, and depression.  The patient verbalized understanding of the proper use and possible adverse effects of OCPs. All of the patient's questions and concerns were addressed. Azithromycin Counseling:  I discussed with the patient the risks of azithromycin including but not limited to GI upset, allergic reaction, drug rash, diarrhea, and yeast infections. High Dose Vitamin A Pregnancy And Lactation Text: High dose vitamin A therapy is contraindicated during pregnancy and breast feeding. Topical Retinoid counseling:  Patient advised to apply a pea-sized amount only at bedtime and wait 30 minutes after washing their face before applying.  If too drying, patient may add a non-comedogenic moisturizer. The patient verbalized understanding of the proper use and possible adverse effects of retinoids.  All of the patient's questions and concerns were addressed. Cleanser Recommendations: Discussed importance of using oil free cleansers Bactrim Counseling:  I discussed with the patient the risks of sulfa antibiotics including but not limited to GI upset, allergic reaction, drug rash, diarrhea, dizziness, photosensitivity, and yeast infections.  Rarely, more serious reactions can occur including but not limited to aplastic anemia, agranulocytosis, methemoglobinemia, blood dyscrasias, liver or kidney failure, lung infiltrates or desquamative/blistering drug rashes. Benzoyl Peroxide Counseling: Patient counseled that medicine may cause skin irritation and bleach clothing.  In the event of skin irritation, the patient was advised to reduce the amount of the drug applied or use it less frequently.   The patient verbalized understanding of the proper use and possible adverse effects of benzoyl peroxide.  All of the patient's questions and concerns were addressed. Isotretinoin Pregnancy And Lactation Text: This medication is Pregnancy Category X and is considered extremely dangerous during pregnancy. It is unknown if it is excreted in breast milk. Winlevi Pregnancy And Lactation Text: This medication is considered safe during pregnancy and breastfeeding. Erythromycin Pregnancy And Lactation Text: This medication is Pregnancy Category B and is considered safe during pregnancy. It is also excreted in breast milk. Azelaic Acid Counseling: Patient counseled that medicine may cause skin irritation and to avoid applying near the eyes.  In the event of skin irritation, the patient was advised to reduce the amount of the drug applied or use it less frequently.   The patient verbalized understanding of the proper use and possible adverse effects of azelaic acid.  All of the patient's questions and concerns were addressed. Topical Sulfur Applications Pregnancy And Lactation Text: This medication is Pregnancy Category C and has an unknown safety profile during pregnancy. It is unknown if this topical medication is excreted in breast milk. Aklief counseling:  Patient advised to apply a pea-sized amount only at bedtime and wait 30 minutes after washing their face before applying.  If too drying, patient may add a non-comedogenic moisturizer.  The most commonly reported side effects including irritation, redness, scaling, dryness, stinging, burning, itching, and increased risk of sunburn.  The patient verbalized understanding of the proper use and possible adverse effects of retinoids.  All of the patient's questions and concerns were addressed. Doxycycline Pregnancy And Lactation Text: This medication is Pregnancy Category D and not consider safe during pregnancy. It is also excreted in breast milk but is considered safe for shorter treatment courses. Topical Clindamycin Pregnancy And Lactation Text: This medication is Pregnancy Category B and is considered safe during pregnancy. It is unknown if it is excreted in breast milk. Tetracycline Counseling: Patient counseled regarding possible photosensitivity and increased risk for sunburn.  Patient instructed to avoid sunlight, if possible.  When exposed to sunlight, patients should wear protective clothing, sunglasses, and sunscreen.  The patient was instructed to call the office immediately if the following severe adverse effects occur:  hearing changes, easy bruising/bleeding, severe headache, or vision changes.  The patient verbalized understanding of the proper use and possible adverse effects of tetracycline.  All of the patient's questions and concerns were addressed. Patient understands to avoid pregnancy while on therapy due to potential birth defects. Use Enhanced Medication Counseling?: No Dapsone Pregnancy And Lactation Text: This medication is Pregnancy Category C and is not considered safe during pregnancy or breast feeding. Tazorac Pregnancy And Lactation Text: This medication is not safe during pregnancy. It is unknown if this medication is excreted in breast milk. Spironolactone Counseling: Patient advised regarding risks of diarrhea, abdominal pain, hyperkalemia, birth defects (for female patients), liver toxicity and renal toxicity. The patient may need blood work to monitor liver and kidney function and potassium levels while on therapy. The patient verbalized understanding of the proper use and possible adverse effects of spironolactone.  All of the patient's questions and concerns were addressed. Birth Control Pills Pregnancy And Lactation Text: This medication should be avoided if pregnant and for the first 30 days post-partum. Topical Retinoid Pregnancy And Lactation Text: This medication is Pregnancy Category C. It is unknown if this medication is excreted in breast milk. Sarecycline Counseling: Patient advised regarding possible photosensitivity and discoloration of the teeth, skin, lips, tongue and gums.  Patient instructed to avoid sunlight, if possible.  When exposed to sunlight, patients should wear protective clothing, sunglasses, and sunscreen.  The patient was instructed to call the office immediately if the following severe adverse effects occur:  hearing changes, easy bruising/bleeding, severe headache, or vision changes.  The patient verbalized understanding of the proper use and possible adverse effects of sarecycline.  All of the patient's questions and concerns were addressed. Bactrim Pregnancy And Lactation Text: This medication is Pregnancy Category D and is known to cause fetal risk.  It is also excreted in breast milk. Benzoyl Peroxide Pregnancy And Lactation Text: This medication is Pregnancy Category C. It is unknown if benzoyl peroxide is excreted in breast milk. Azithromycin Pregnancy And Lactation Text: This medication is considered safe during pregnancy and is also secreted in breast milk. Minocycline Counseling: Patient advised regarding possible photosensitivity and discoloration of the teeth, skin, lips, tongue and gums.  Patient instructed to avoid sunlight, if possible.  When exposed to sunlight, patients should wear protective clothing, sunglasses, and sunscreen.  The patient was instructed to call the office immediately if the following severe adverse effects occur:  hearing changes, easy bruising/bleeding, severe headache, or vision changes.  The patient verbalized understanding of the proper use and possible adverse effects of minocycline.  All of the patient's questions and concerns were addressed. Azelaic Acid Pregnancy And Lactation Text: This medication is considered safe during pregnancy and breast feeding. High Dose Vitamin A Counseling: Side effects reviewed, pt to contact office should one occur. Winlevi Counseling:  I discussed with the patient the risks of topical clascoterone including but not limited to erythema, scaling, itching, and stinging. Patient voiced their understanding. Aklief Pregnancy And Lactation Text: It is unknown if this medication is safe to use during pregnancy.  It is unknown if this medication is excreted in breast milk.  Breastfeeding women should use the topical cream on the smallest area of the skin for the shortest time needed while breastfeeding.  Do not apply to nipple and areola. Isotretinoin Counseling: Patient should get monthly blood tests, not donate blood, not drive at night if vision affected, not share medication, and not undergo elective surgery for 6 months after tx completed. Side effects reviewed, pt to contact office should one occur. Topical Sulfur Applications Counseling: Topical Sulfur Counseling: Patient counseled that this medication may cause skin irritation or allergic reactions.  In the event of skin irritation, the patient was advised to reduce the amount of the drug applied or use it less frequently.   The patient verbalized understanding of the proper use and possible adverse effects of topical sulfur application.  All of the patient's questions and concerns were addressed. Sunscreen Recommendations: Discussed importance of sun protection with sunscreen and photo protective clothing Topical Clindamycin Counseling: Patient counseled that this medication may cause skin irritation or allergic reactions.  In the event of skin irritation, the patient was advised to reduce the amount of the drug applied or use it less frequently.   The patient verbalized understanding of the proper use and possible adverse effects of clindamycin.  All of the patient's questions and concerns were addressed. Detail Level: Zone Erythromycin Counseling:  I discussed with the patient the risks of erythromycin including but not limited to GI upset, allergic reaction, drug rash, diarrhea, increase in liver enzymes, and yeast infections. Bpo Recommendations: Benzoyl Peroxide wash to use once daily to affected areas Spironolactone Pregnancy And Lactation Text: This medication can cause feminization of the male fetus and should be avoided during pregnancy. The active metabolite is also found in breast milk.

## 2025-06-10 NOTE — ED PROVIDER NOTE - RESPIRATORY NEGATIVE STATEMENT, MLM
Called Roula back to let her know that Dr Hopkins did see the pt after the hospital but PT/OT wasn't discussed neither was any thing said about a wound.  Advised that pt should reach out to cardiology if they are the ones that recommended PT/OT.  Let Roula know that Dr Hopkins doesn't go to the hospital and didn't see pt while there.  Roula will reach back out to pt and let her know that contact cardiology office.  If wound is surgery site then pt needs to touch base with the surgeon's office.   no chest pain, no cough, and no shortness of breath.

## 2025-07-16 NOTE — BH TREATMENT PLAN - NSTXPLANSTARTDATE_PSY_ALL_CORE
RT Inhaler-Nebulizer Bronchodilator Protocol Note    There is a bronchodilator order in the chart from a provider indicating to follow the RT Bronchodilator Protocol and there is an “Initiate RT Inhaler-Nebulizer Bronchodilator Protocol” order as well (see protocol at bottom of note).    CXR Findings:  No results found.    The findings from the last RT Protocol Assessment were as follows:   History Pulmonary Disease: Chronic pulmonary disease  Respiratory Pattern: Regular pattern and RR 12-20 bpm  Breath Sounds: Intermittent or unilateral wheezes  Cough: Strong, spontaneous, non-productive  Indication for Bronchodilator Therapy: On home bronchodilators, Wheezing associated with pulm disorder, Decreased or absent breath sounds  Bronchodilator Assessment Score: 6    Aerosolized bronchodilator medication orders have been revised according to the RT Inhaler-Nebulizer Bronchodilator Protocol below.    Respiratory Therapist to perform RT Therapy Protocol Assessment initially then follow the protocol.  Repeat RT Therapy Protocol Assessment PRN for score 0-3 or on second treatment, BID, and PRN for scores above 3.    No Indications - adjust the frequency to every 6 hours PRN wheezing or bronchospasm, if no treatments needed after 48 hours then discontinue using Per Protocol order mode.     If indication present, adjust the RT bronchodilator orders based on the Bronchodilator Assessment Score as indicated below.  Use Inhaler orders unless patient has one or more of the following: on home nebulizer, not able to hold breath for 10 seconds, is not alert and oriented, cannot activate and use MDI correctly, or respiratory rate 25 breaths per minute or more, then use the equivalent nebulizer order(s) with same Frequency and PRN reasons based on the score.  If a patient is on this medication at home then do not decrease Frequency below that used at home.    0-3 - enter or revise RT bronchodilator order(s) to equivalent RT 
06-Jan-2022 14:26
14-Jan-2022 10:27